# Patient Record
Sex: FEMALE | Race: WHITE | HISPANIC OR LATINO | Employment: UNEMPLOYED | URBAN - METROPOLITAN AREA
[De-identification: names, ages, dates, MRNs, and addresses within clinical notes are randomized per-mention and may not be internally consistent; named-entity substitution may affect disease eponyms.]

---

## 2016-08-18 LAB — PAP-ABSTRACT: NORMAL

## 2017-01-13 ENCOUNTER — TELEPHONE (OUTPATIENT)
Dept: NURSING | Facility: CLINIC | Age: 22
End: 2017-01-13

## 2017-01-13 ENCOUNTER — HOSPITAL ENCOUNTER (OUTPATIENT)
Facility: CLINIC | Age: 22
Discharge: HOME | End: 2017-01-13
Attending: OBSTETRICS & GYNECOLOGY | Admitting: OBSTETRICS & GYNECOLOGY
Payer: COMMERCIAL

## 2017-01-13 VITALS — SYSTOLIC BLOOD PRESSURE: 126 MMHG | TEMPERATURE: 97.7 F | HEIGHT: 62 IN | DIASTOLIC BLOOD PRESSURE: 58 MMHG

## 2017-01-13 LAB
ALBUMIN UR-MCNC: NEGATIVE MG/DL
APPEARANCE UR: CLEAR
BACTERIA #/AREA URNS HPF: ABNORMAL /HPF
BILIRUB UR QL STRIP: NEGATIVE
COLOR UR AUTO: ABNORMAL
FIBRONECTIN FETAL VAG QL: NORMAL
GLUCOSE UR STRIP-MCNC: NEGATIVE MG/DL
HGB UR QL STRIP: NEGATIVE
HYALINE CASTS #/AREA URNS LPF: 1 /LPF (ref 0–2)
KETONES UR STRIP-MCNC: NEGATIVE MG/DL
LEUKOCYTE ESTERASE UR QL STRIP: NEGATIVE
NITRATE UR QL: NEGATIVE
PH UR STRIP: 7.5 PH (ref 5–7)
RBC #/AREA URNS AUTO: <1 /HPF (ref 0–2)
SP GR UR STRIP: 1.01 (ref 1–1.03)
SQUAMOUS #/AREA URNS AUTO: 2 /HPF (ref 0–1)
URN SPEC COLLECT METH UR: ABNORMAL
UROBILINOGEN UR STRIP-MCNC: NORMAL MG/DL (ref 0–2)
WBC #/AREA URNS AUTO: <1 /HPF (ref 0–2)

## 2017-01-13 PROCEDURE — 99214 OFFICE O/P EST MOD 30 MIN: CPT | Mod: 25

## 2017-01-13 PROCEDURE — 99214 OFFICE O/P EST MOD 30 MIN: CPT

## 2017-01-13 PROCEDURE — 81001 URINALYSIS AUTO W/SCOPE: CPT | Performed by: OBSTETRICS & GYNECOLOGY

## 2017-01-13 PROCEDURE — 59025 FETAL NON-STRESS TEST: CPT | Mod: 26 | Performed by: OBSTETRICS & GYNECOLOGY

## 2017-01-13 PROCEDURE — 40000809 ZZH STATISTIC NO DOCUMENTATION TO SUPPORT CHARGE

## 2017-01-13 PROCEDURE — 59025 FETAL NON-STRESS TEST: CPT | Mod: 59

## 2017-01-13 PROCEDURE — 82731 ASSAY OF FETAL FIBRONECTIN: CPT | Performed by: OBSTETRICS & GYNECOLOGY

## 2017-01-13 RX ORDER — ONDANSETRON 2 MG/ML
4 INJECTION INTRAMUSCULAR; INTRAVENOUS EVERY 6 HOURS PRN
Status: DISCONTINUED | OUTPATIENT
Start: 2017-01-13 | End: 2017-01-13 | Stop reason: HOSPADM

## 2017-01-13 NOTE — PLAN OF CARE
Pt given discharge instructions, verbalized understanding.  All questions answered, ambulatory and able to leave floor by self.

## 2017-01-13 NOTE — PROVIDER NOTIFICATION
Called Dr Ochoa, updated that labs were WNL, only 2 ctx noted on monitor, pt feeling crampy still but not any worse.  SVE done per his request and cervix was closed.  Will send home,  No further follow up needed.

## 2017-01-13 NOTE — IP AVS SNAPSHOT
MRN:[id number]                      After Visit Summary   1/13/2017    Malgorzata Ramachandran    MRN: [id number]           Thank you!     Thank you for choosing [facility] for your care. Our goal is always to provide you with excellent care. Hearing back from our patients is one way we can continue to improve our services. Please take a few minutes to complete the written survey that you may receive in the mail after you visit. If you would like to speak to someone directly about your visit please contact Patient Relations at [phone]. Thank you!          Patient Information     Date Of Birth          1995        About your hospital stay     You were admitted on:  January 13, 2017 You last received care in the:  [facility] Labor and Delivery    You were discharged on:  January 13, 2017       Who to Call     For medical emergencies, please call 911.  For non-urgent questions about your medical care, please call your primary care provider or clinic, [phone]          Attending Provider     Provider    Ricardo Ochoa MD       Primary Care Provider Office Phone # Fax #    [facility] [phone] [address] [facility] [address]        Your next 10 appointments already scheduled     Jan 16, 2017  3:00 PM   New Prenatal with Parth [facility]ja MD   [facility]    [address]  [facility] [address] [phone]              Further instructions from your care team       Discharge Instruction for Undelivered Patients      You were seen for: Labor Assessment and Fetal Assessment  We Consulted: DR Ochoa  You had (Test or Medicine):Fetal monitoring, urine testing     Diet:   Drink 8 to 12 glasses of liquids (milk, juice, water) every day.     Activity:  Count fetal kicks everyday (see handout)  Call your doctor or nurse midwife if your baby is moving less than  "usual.     Call your provider if you notice:  Swelling in your face or increased swelling in your hands or legs.  Headaches that are not relieved by Tylenol (acetaminophen).  Changes in your vision (blurring: seeing spots or stars.)  Nausea (sick to your stomach) and vomiting (throwing up).   Weight gain of 5 pounds or more per week.  Heartburn that doesn't go away.  Signs of bladder infection: pain when you urinate (use the toilet), need to go more often and more urgently.  The bag of hollis (rupture of membranes) breaks, or you notice leaking in your underwear.  Bright red blood in your underwear.  Abdominal (lower belly) or stomach pain.  Second (plus) baby: Contractions (tightening) less than 10 minutes apart and getting stronger.  *If less than 34 weeks: Contractions (tightenings) more than 6 times in one hour.  Increase or change in vaginal discharge (note the color and amount)    Follow-up:  As scheduled in the clinic          Pending Results     No orders found from 2017 to 2017.            Admission Information        Provider Department Dept Phone    2017 Ricardo Ochoa MD, MD  Labor And Delivery [phone]      Your Vitals Were     Blood Pressure Temperature Height Last Period          126/58 mmHg 97.7  F (36.5  C) (Oral) 1.575 m (5' 2\") 2016 (Exact Date)        [employer] Information     [employer] lets you send messages to your doctor, view your test results, renew your prescriptions, schedule appointments and more. To sign up, go to [email or web address] . Click on \"Log in\" on the left side of the screen, which will take you to the Welcome page. Then click on \"Sign up Now\" on the right side of the page.     You will be asked to enter the access code listed below, as well as some personal information. Please follow the directions to create your username and password.     Your access code is: [email or web address]  Expires: 3/11/2017  3:54 AM     Your access code will  in 90 days. " If you need help or a new code, please call your [place] clinic or [phone].        Care EveryWhere ID     This is your Care EveryWhere ID. This could be used by other organizations to access your [place] medical records  [id number]           Review of your medicines      UNREVIEWED medicines. Ask your doctor about these medicines        Dose / Directions    alum & mag hydroxide-simethicone 200-200-20 MG/5ML Susp suspension   Commonly known as:  MYLANTA        Dose:  30 mL   Take 30 mLs by mouth every 4 hours as needed for indigestion   Quantity:  1 Bottle   Refills:  0       metoclopramide 10 MG tablet   Commonly known as:  REGLAN        Dose:  10 mg   Take 1 tablet (10 mg) by mouth every 8 hours as needed (Nausea or Vomiting)   Quantity:  20 tablet   Refills:  1       prenatal multivitamin  plus iron 27-0.8 MG Tabs per tablet        Dose:  1 tablet   Take 1 tablet by mouth daily   Quantity:  90 tablet   Refills:  3                Protect others around you: Learn how to safely use, store and throw away your medicines at www.disposemymeds.org.             Medication List: This is a list of all your medications and when to take them. Check marks below indicate your daily home schedule. Keep this list as a reference.      Medications           Morning Afternoon Evening Bedtime As Needed    alum & mag hydroxide-simethicone 200-200-20 MG/5ML Susp suspension   Commonly known as:  MYLANTA   Take 30 mLs by mouth every 4 hours as needed for indigestion                                metoclopramide 10 MG tablet   Commonly known as:  REGLAN   Take 1 tablet (10 mg) by mouth every 8 hours as needed (Nausea or Vomiting)                                prenatal multivitamin  plus iron 27-0.8 MG Tabs per tablet   Take 1 tablet by mouth daily

## 2017-01-13 NOTE — IP AVS SNAPSHOT
[facility] Labor and Delivery    [address]    [facility] [address]    Phone:  [phone]    Fax:  [phone]                                       After Visit Summary   1/13/2017    Malgorzata Ramachandran    MRN: [id number]           After Visit Summary Signature Page     I have received my discharge instructions, and my questions have been answered. I have discussed any challenges I see with this plan with the nurse or doctor.    ..........................................................................................................................................  Patient/Patient Representative Signature      ..........................................................................................................................................  Patient Representative Print Name and Relationship to Patient    ..................................................               ................................................  Date                                            Time    ..........................................................................................................................................  Reviewed by Signature/Title    ...................................................              ..............................................  Date                                                            Time

## 2017-01-13 NOTE — TELEPHONE ENCOUNTER
"Call Type: Triage Call    Presenting Problem: EED: 17. \"I have been cramping like bad.\"  Also, described clear/white mucous discharge. Dr. Nice paged to  [phone] @ 10:07 hrs.  Triage Note:  Guideline Title: Pregnancy:  Labor, 20 to 37 Weeks  Recommended Disposition: Call Provider Immediately  Original Inclination:  Override Disposition:  Intended Action:  Physician Contacted: No  Gestation 20 weeks or more AND decreased fetal movement compared to previous  activity ?  YES  New or worsening signs and symptoms that may indicate shock ? NO  Gestation more than 37 weeks AND signs of labor ? NO  Gestation less than 20 weeks AND signs of labor ? NO  Feeling of baby coming or wanting to push (urge to bear down) ? NO  Unbearable abdominal/pelvic pain ? NO  Umbilical cord or any part of the baby (head, bottom, arm or leg) at the opening  of the vagina ? NO  Gush or leakage of green or green-tinged or port-wine colored fluid (reddish and  watery) from the vagina ? NO  No relief between contractions ? NO  Continuous bright red vaginal bleeding for more than 15 minutes (more than  spotting) ? NO  Physician Instructions:  Care Advice: IMMEDIATE ACTION  Drink 2 or 3 glasses of water or juice.  Do not drink caffeinated drinks or  sodas.  See another provider immediately if unable to talk with your provider  within 1 hour. Follow the directions from your provider's on call resource  if you are unable to speak to your provider directly.  You may be directed  to go to the hospital's Labor & Delivery department for evaluation. Another  adult should drive.  Lie on left side to improve circulation to the fetus.  Call  if any of these occur: profuse bright red vaginal bleeding  continuous (without relaxation) abdominal pain  the umbilical cord or any fetal part in vagina  bag of hollis coming through vagina  feeling of wanting to push or have a bowel movement.  "

## 2017-01-13 NOTE — PROGRESS NOTES
here at 28.1 weeks with cramping and decreased movement since last evening.  She also noted a small amount of brown mucousy discharge last evening one time, but has had no further leaking or discharge since.  EFM placed and explained, urine and FFN collected and sent per Dr Ochoa's order.

## 2017-01-16 ENCOUNTER — PRENATAL OFFICE VISIT (OUTPATIENT)
Dept: OBGYN | Facility: CLINIC | Age: 22
End: 2017-01-16
Payer: COMMERCIAL

## 2017-01-16 VITALS
DIASTOLIC BLOOD PRESSURE: 58 MMHG | SYSTOLIC BLOOD PRESSURE: 100 MMHG | TEMPERATURE: 98.4 F | HEIGHT: 62 IN | WEIGHT: 197.5 LBS | BODY MASS INDEX: 36.34 KG/M2

## 2017-01-16 DIAGNOSIS — M54.42 BILATERAL LOW BACK PAIN WITH LEFT-SIDED SCIATICA, UNSPECIFIED CHRONICITY: ICD-10-CM

## 2017-01-16 DIAGNOSIS — O09.623 HIGH-RISK PREGNANCY, YOUNG MULTIGRAVIDA, THIRD TRIMESTER: Primary | ICD-10-CM

## 2017-01-16 DIAGNOSIS — O34.219 PREVIOUS CESAREAN DELIVERY, ANTEPARTUM CONDITION OR COMPLICATION: ICD-10-CM

## 2017-01-16 PROBLEM — O09.629 HIGH-RISK PREGNANCY, YOUNG MULTIGRAVIDA: Status: ACTIVE | Noted: 2017-01-16

## 2017-01-16 PROCEDURE — 90471 IMMUNIZATION ADMIN: CPT | Performed by: OBSTETRICS & GYNECOLOGY

## 2017-01-16 PROCEDURE — 90715 TDAP VACCINE 7 YRS/> IM: CPT | Performed by: OBSTETRICS & GYNECOLOGY

## 2017-01-16 PROCEDURE — 99201 ZZC OFFICE/OUTPT VISIT, NEW, LEVEL I: CPT | Mod: 25 | Performed by: OBSTETRICS & GYNECOLOGY

## 2017-01-16 RX ORDER — PRENATAL VIT/IRON FUM/FOLIC AC 27MG-0.8MG
1 TABLET ORAL DAILY
Qty: 90 TABLET | Refills: 3 | Status: SHIPPED | OUTPATIENT
Start: 2017-01-16 | End: 2017-04-12

## 2017-01-16 NOTE — PROGRESS NOTES
IUP at 28 4/7 weeks;      Transfer of prenatal care     Prenatal records reviewed; normal First Trimester Screen/fetal survey     Previous C/S; desires repeat  Will schedule at next visit  Will do glucose in 1-2 weeks

## 2017-01-16 NOTE — NURSING NOTE
"Chief Complaint   Patient presents with     Prenatal Care     Transferred OB Care   28w4d  Unable to stay for 1 hour GCT today   Needs rx for prenatal vit - pended in epic  Here today with her daughter    initial /58 mmHg  Temp(Src) 98.4  F (36.9  C) (Oral)  Ht 5' 2\" (1.575 m)  Wt 197 lb 8 oz (89.585 kg)  BMI 36.11 kg/m2  LMP 06/30/2016 (Exact Date) Estimated body mass index is 36.11 kg/(m^2) as calculated from the following:    Height as of this encounter: 5' 2\" (1.575 m).    Weight as of this encounter: 197 lb 8 oz (89.585 kg).  BP completed using cuff size large.  Lise Saucedo CMA    "

## 2017-01-19 ENCOUNTER — TELEPHONE (OUTPATIENT)
Dept: OBGYN | Facility: CLINIC | Age: 22
End: 2017-01-19

## 2017-01-19 NOTE — TELEPHONE ENCOUNTER
Patient called and requested to schedule her  section on 3/31/17.    [facility] is full on 3/31/17.  Called patient to offer to schedule on 4/3/17 with Dr. Loja.  Patient would prefer to schedule on 3/30/17.  Patient advised Dr. Loja is off on  and she stated that she would like to schedule with another provider on 3/30/17 if possible.  If that is not possible, she will schedule on 4/3/17 with Dr. Loja.  Please advise.  Thank you.

## 2017-01-24 ENCOUNTER — ALLIED HEALTH/NURSE VISIT (OUTPATIENT)
Dept: NURSING | Facility: CLINIC | Age: 22
End: 2017-01-24
Payer: COMMERCIAL

## 2017-01-24 DIAGNOSIS — O09.623 HIGH-RISK PREGNANCY, YOUNG MULTIGRAVIDA, THIRD TRIMESTER: ICD-10-CM

## 2017-01-24 DIAGNOSIS — Z23 NEED FOR PROPHYLACTIC VACCINATION AND INOCULATION AGAINST INFLUENZA: Primary | ICD-10-CM

## 2017-01-24 LAB
ERYTHROCYTE [DISTWIDTH] IN BLOOD BY AUTOMATED COUNT: 12.5 % (ref 10–15)
GLUCOSE 1H P 50 G GLC PO SERPL-MCNC: 105 MG/DL (ref 60–129)
HCT VFR BLD AUTO: 32.6 % (ref 35–47)
HGB BLD-MCNC: 10.8 G/DL (ref 11.7–15.7)
MCH RBC QN AUTO: 31.2 PG (ref 26.5–33)
MCHC RBC AUTO-ENTMCNC: 33.1 G/DL (ref 31.5–36.5)
MCV RBC AUTO: 94 FL (ref 78–100)
PLATELET # BLD AUTO: 225 10E9/L (ref 150–450)
RBC # BLD AUTO: 3.46 10E12/L (ref 3.8–5.2)
WBC # BLD AUTO: 10.4 10E9/L (ref 4–11)

## 2017-01-24 PROCEDURE — 85027 COMPLETE CBC AUTOMATED: CPT | Performed by: OBSTETRICS & GYNECOLOGY

## 2017-01-24 PROCEDURE — 90471 IMMUNIZATION ADMIN: CPT

## 2017-01-24 PROCEDURE — 90686 IIV4 VACC NO PRSV 0.5 ML IM: CPT

## 2017-01-24 PROCEDURE — 86780 TREPONEMA PALLIDUM: CPT | Performed by: OBSTETRICS & GYNECOLOGY

## 2017-01-24 PROCEDURE — 36415 COLL VENOUS BLD VENIPUNCTURE: CPT | Performed by: OBSTETRICS & GYNECOLOGY

## 2017-01-24 PROCEDURE — 82950 GLUCOSE TEST: CPT | Performed by: OBSTETRICS & GYNECOLOGY

## 2017-01-24 NOTE — PROGRESS NOTES
Injectable Influenza Immunization Documentation    1.  Is the person to be vaccinated sick today?  No    2. Does the person to be vaccinated have an allergy to eggs or to a component of the vaccine?  No    3. Has the person to be vaccinated today ever had a serious reaction to influenza vaccine in the past?  No    4. Has the person to be vaccinated ever had Guillain-[place] syndrome?  No     Form completed by Deya Vides MA

## 2017-01-24 NOTE — TELEPHONE ENCOUNTER
Surgery:  REPEAT  SECTION  Date:  3/30/17  Time:  11:30 AM  Hospital:  [facility]  Surgeon:  Dr. Dodson    Patient advised of the following:  The hospital will contact you 24-48 hours prior to surgery to discuss any pre op instructions.  Contact your insurance company to see if a pre authorization is needed.  A pre op physical will be done by your OB/GYN physician at one of your prenatal appointments.  Make arrangements to have someone drive you home from the hospital.    Surgery specific and hospital information mailed to patient.    Information was placed on the surgery calendar in [place].

## 2017-01-25 LAB — T PALLIDUM IGG+IGM SER QL: NEGATIVE

## 2017-02-20 ENCOUNTER — PRENATAL OFFICE VISIT (OUTPATIENT)
Dept: OBGYN | Facility: CLINIC | Age: 22
End: 2017-02-20
Payer: MEDICAID

## 2017-02-20 VITALS
DIASTOLIC BLOOD PRESSURE: 50 MMHG | SYSTOLIC BLOOD PRESSURE: 96 MMHG | WEIGHT: 202.6 LBS | TEMPERATURE: 98.5 F | BODY MASS INDEX: 37.06 KG/M2

## 2017-02-20 DIAGNOSIS — O09.623 HIGH-RISK PREGNANCY, YOUNG MULTIGRAVIDA, THIRD TRIMESTER: Primary | ICD-10-CM

## 2017-02-20 LAB
ALBUMIN UR-MCNC: NEGATIVE MG/DL
APPEARANCE UR: CLEAR
BILIRUB UR QL STRIP: NEGATIVE
COLOR UR AUTO: YELLOW
GLUCOSE UR STRIP-MCNC: NEGATIVE MG/DL
HGB UR QL STRIP: NEGATIVE
KETONES UR STRIP-MCNC: NEGATIVE MG/DL
LEUKOCYTE ESTERASE UR QL STRIP: NEGATIVE
NITRATE UR QL: NEGATIVE
PH UR STRIP: 6.5 PH (ref 5–7)
SP GR UR STRIP: 1.02 (ref 1–1.03)
URN SPEC COLLECT METH UR: NORMAL
UROBILINOGEN UR STRIP-ACNC: 1 EU/DL (ref 0.2–1)

## 2017-02-20 PROCEDURE — 99212 OFFICE O/P EST SF 10 MIN: CPT | Performed by: OBSTETRICS & GYNECOLOGY

## 2017-02-20 PROCEDURE — 87086 URINE CULTURE/COLONY COUNT: CPT | Performed by: OBSTETRICS & GYNECOLOGY

## 2017-02-20 PROCEDURE — 81003 URINALYSIS AUTO W/O SCOPE: CPT | Performed by: OBSTETRICS & GYNECOLOGY

## 2017-02-20 NOTE — MR AVS SNAPSHOT
After Visit Summary   2/20/2017    Malgorzata Ramachandran    MRN: [id number]           Patient Information     Date Of Birth          1995        Visit Information        Provider Department      2/20/2017 5:30 PM Parth Loja MD [facility]        Today's Diagnoses     High-risk pregnancy, young multigravida, third trimester    -  1       Follow-ups after your visit        Follow-up notes from your care team     Return in about 2 weeks (around 3/6/2017).      Your next 10 appointments already scheduled     Mar 30, 2017   Procedure with Teodora Dodson,    [facility] Labor and Delivery (--)    [address] [facility] [address] [phone]              Who to contact     If you have questions or need follow up information about today's clinic visit or your schedule please contact [facility] directly at [phone].  Normal or non-critical lab and imaging results will be communicated to you by MyChart, letter or phone within 4 business days after the clinic has received the results. If you do not hear from us within 7 days, please contact the clinic through [employer] or phone. If you have a critical or abnormal lab result, we will notify you by phone as soon as possible.  Submit refill requests through [employer] or call your pharmacy and they will forward the refill request to us. Please allow 3 business days for your refill to be completed.          Additional Information About Your Visit        MyChart Information     [employer] gives you secure access to your electronic health record. If you see a primary care provider, you can also send messages to your care team and make appointments. If you have questions, please call your primary care clinic.  If you do not have a primary care provider, please call [phone] and they will assist you.        Care EveryWhere ID     This is your Care EveryWhere ID. This could be used by other  organizations to access your [place] medical records  [id number]        Your Vitals Were     Temperature Last Period BMI (Body Mass Index)             98.5  F (36.9  C) (Oral) 06/30/2016 (Exact Date) 37.06 kg/m2          Blood Pressure from Last 3 Encounters:   02/20/17 96/50   01/16/17 100/58   01/13/17 126/58    Weight from Last 3 Encounters:   02/20/17 202 lb 9.6 oz (91.9 kg)   01/16/17 197 lb 8 oz (89.6 kg)   12/11/16 190 lb 14.7 oz (86.6 kg)              We Performed the Following     UA reflex to Microscopic     Urine Culture Aerobic Bacterial        Primary Care Provider Office Phone # Fax #    [facility] [phone] [address] [facility] [address]        Thank you!     Thank you for choosing [facility]  for your care. Our goal is always to provide you with excellent care. Hearing back from our patients is one way we can continue to improve our services. Please take a few minutes to complete the written survey that you may receive in the mail after your visit with us. Thank you!             Your Updated Medication List - Protect others around you: Learn how to safely use, store and throw away your medicines at www.disposemymeds.org.          This list is accurate as of: 2/20/17  6:03 PM.  Always use your most recent med list.                   Brand Name Dispense Instructions for use    order for DME     1 Device    Equipment being ordered: maternal support belt       prenatal multivitamin  plus iron 27-0.8 MG Tabs per tablet     90 tablet    Take 1 tablet by mouth daily

## 2017-02-20 NOTE — NURSING NOTE
"Chief Complaint   Patient presents with     Prenatal Care     c/o low pelvic cramping   33w4d      Initial BP 96/50 (BP Location: Right arm, Cuff Size: Adult Large)  Temp 98.5  F (36.9  C) (Oral)  Wt 202 lb 9.6 oz (91.9 kg)  LMP 06/30/2016 (Exact Date)  BMI 37.06 kg/m2 Estimated body mass index is 37.06 kg/(m^2) as calculated from the following:    Height as of 1/16/17: 5' 2\" (1.575 m).    Weight as of this encounter: 202 lb 9.6 oz (91.9 kg).  Medication Reconciliation: complete    "

## 2017-02-21 ENCOUNTER — HOSPITAL ENCOUNTER (OUTPATIENT)
Facility: CLINIC | Age: 22
LOS: 1 days | Discharge: HOME | End: 2017-02-21
Attending: OBSTETRICS & GYNECOLOGY | Admitting: OBSTETRICS & GYNECOLOGY
Payer: MEDICAID

## 2017-02-21 VITALS — TEMPERATURE: 98.7 F | SYSTOLIC BLOOD PRESSURE: 118 MMHG | DIASTOLIC BLOOD PRESSURE: 61 MMHG | RESPIRATION RATE: 16 BRPM

## 2017-02-21 DIAGNOSIS — Z36.89 ENCOUNTER FOR TRIAGE IN PREGNANT PATIENT: Primary | ICD-10-CM

## 2017-02-21 LAB
A1 MICROGLOB PLACENTAL VAG QL: NEGATIVE
ALBUMIN UR-MCNC: 10 MG/DL
APPEARANCE UR: ABNORMAL
BILIRUB UR QL STRIP: NEGATIVE
COLOR UR AUTO: YELLOW
GLUCOSE UR STRIP-MCNC: NEGATIVE MG/DL
HGB UR QL STRIP: NEGATIVE
KETONES UR STRIP-MCNC: 5 MG/DL
LEUKOCYTE ESTERASE UR QL STRIP: NEGATIVE
MUCOUS THREADS #/AREA URNS LPF: PRESENT /LPF
NITRATE UR QL: NEGATIVE
PH UR STRIP: 6 PH (ref 5–7)
RBC #/AREA URNS AUTO: 1 /HPF (ref 0–2)
SP GR UR STRIP: 1.02 (ref 1–1.03)
SQUAMOUS #/AREA URNS AUTO: 20 /HPF (ref 0–1)
URN SPEC COLLECT METH UR: ABNORMAL
UROBILINOGEN UR STRIP-MCNC: NORMAL MG/DL (ref 0–2)
WBC #/AREA URNS AUTO: 1 /HPF (ref 0–2)

## 2017-02-21 PROCEDURE — 84112 EVAL AMNIOTIC FLUID PROTEIN: CPT | Performed by: OBSTETRICS & GYNECOLOGY

## 2017-02-21 PROCEDURE — 25000132 ZZH RX MED GY IP 250 OP 250 PS 637: Performed by: OBSTETRICS & GYNECOLOGY

## 2017-02-21 PROCEDURE — 59025 FETAL NON-STRESS TEST: CPT | Mod: 26 | Performed by: OBSTETRICS & GYNECOLOGY

## 2017-02-21 PROCEDURE — 59025 FETAL NON-STRESS TEST: CPT

## 2017-02-21 PROCEDURE — 99213 OFFICE O/P EST LOW 20 MIN: CPT | Mod: 25 | Performed by: OBSTETRICS & GYNECOLOGY

## 2017-02-21 PROCEDURE — 25800025 ZZH RX 258: Performed by: OBSTETRICS & GYNECOLOGY

## 2017-02-21 PROCEDURE — 99214 OFFICE O/P EST MOD 30 MIN: CPT | Mod: 25

## 2017-02-21 PROCEDURE — 40000809 ZZH STATISTIC NO DOCUMENTATION TO SUPPORT CHARGE

## 2017-02-21 PROCEDURE — 81001 URINALYSIS AUTO W/SCOPE: CPT | Performed by: OBSTETRICS & GYNECOLOGY

## 2017-02-21 PROCEDURE — 99214 OFFICE O/P EST MOD 30 MIN: CPT

## 2017-02-21 PROCEDURE — 96360 HYDRATION IV INFUSION INIT: CPT

## 2017-02-21 RX ORDER — HYDROXYZINE PAMOATE 50 MG/1
50-100 CAPSULE ORAL EVERY 6 HOURS PRN
Qty: 10 CAPSULE | Refills: 0 | Status: SHIPPED | OUTPATIENT
Start: 2017-02-21 | End: 2017-04-12

## 2017-02-21 RX ORDER — ACETAMINOPHEN 325 MG/1
650 TABLET ORAL ONCE
Status: COMPLETED | OUTPATIENT
Start: 2017-02-21 | End: 2017-02-21

## 2017-02-21 RX ADMIN — ACETAMINOPHEN 650 MG: 325 TABLET, FILM COATED ORAL at 17:25

## 2017-02-21 RX ADMIN — SODIUM CHLORIDE, POTASSIUM CHLORIDE, SODIUM LACTATE AND CALCIUM CHLORIDE 500 ML: 600; 310; 30; 20 INJECTION, SOLUTION INTRAVENOUS at 17:36

## 2017-02-21 NOTE — PROGRESS NOTES
IUP at 33 4/7 weeks; no vaginal bleeding/ROM or regular contractions     -does note increasing pelvic pressure  PTL warnings reviewed  Planned repeat C/S

## 2017-02-21 NOTE — IP AVS SNAPSHOT
MRN:[id number]                      After Visit Summary   2017    Malgorzata Ramachandran    MRN: [id number]           Thank you!     Thank you for choosing [facility] for your care. Our goal is always to provide you with excellent care. Hearing back from our patients is one way we can continue to improve our services. Please take a few minutes to complete the written survey that you may receive in the mail after you visit. If you would like to speak to someone directly about your visit please contact Patient Relations at [phone]. Thank you!          Patient Information     Date Of Birth          1995        About your hospital stay     You were admitted on:  2017 You last received care in the:  [facility] Labor and Delivery    You were discharged on:  2017       Who to Call     For medical emergencies, please call 911.  For non-urgent questions about your medical care, please call your primary care provider or clinic, [phone]          Attending Provider     Provider Specialty    Suki Moody MD OB/Gyn       Primary Care Provider Office Phone # Fax #    [facility] [phone] [address] [facility] [address]        Your next 10 appointments already scheduled     Mar 30, 2017   Procedure with Teodora Dodson, DO   [facility] Labor and Delivery (--)    [address]  [facility] [id number] [phone]              Further instructions from your care team       Discharge Instruction for Undelivered Patients      You were seen for: abdominal pain/rule out  labor  We Consulted: Dr Suki Moody  You had (Test or Medicine): IV fluids, tylenol, SVE, UA     Diet:   Drink 8 to 12 glasses of liquids (milk, juice, water) every day.  You may eat meals and snacks.     Activity:  Count fetal kicks everyday (see handout)  Call your doctor or nurse midwife if your baby  is moving less than usual.     Call your provider if you notice:  Swelling in your face or increased swelling in your hands or legs.  Headaches that are not relieved by Tylenol (acetaminophen).  Changes in your vision (blurring: seeing spots or stars.)  Nausea (sick to your stomach) and vomiting (throwing up).   Weight gain of 5 pounds or more per week.  Heartburn that doesn't go away.  Signs of bladder infection: pain when you urinate (use the toilet), need to go more often and more urgently.  The bag of hollis (rupture of membranes) breaks, or you notice leaking in your underwear.  Bright red blood in your underwear.  Abdominal (lower belly) or stomach pain.  Second (plus) baby: Contractions (tightening) less than 10 minutes apart and getting stronger.  *If less than 34 weeks: Contractions (tightenings) more than 6 times in one hour.  Increase or change in vaginal discharge (note the color and amount)    Follow-up:  As scheduled in the clinic          Pending Results     Date and Time Order Name Status Description    2/20/2017 1751 URINE CULTURE AEROBIC BACTERIAL In process             Admission Information     Date & Time Provider Department Dept. Phone    2/21/2017 Suki Moody MD [facility] Labor and Delivery [phone]      Your Vitals Were     Blood Pressure Temperature Respirations Last Period          118/61 98.7  F (37.1  C) (Oral) 16 06/30/2016 (Exact Date)        MyChart Information     [employer] gives you secure access to your electronic health record. If you see a primary care provider, you can also send messages to your care team and make appointments. If you have questions, please call your primary care clinic.  If you do not have a primary care provider, please call [phone] and they will assist you.        Care EveryWhere ID     This is your Care EveryWhere ID. This could be used by other organizations to access your [place] medical records  [id number]           Review of your  medicines      UNREVIEWED medicines. Ask your doctor about these medicines        Dose / Directions    prenatal multivitamin  plus iron 27-0.8 MG Tabs per tablet   Used for:  Previous  delivery, antepartum condition or complication, High-risk pregnancy, young multigravida, third trimester        Dose:  1 tablet   Take 1 tablet by mouth daily   Quantity:  90 tablet   Refills:  3         START taking        Dose / Directions    hydrOXYzine 50 MG capsule   Commonly known as:  VISTARIL        Dose:   mg   Take 1-2 capsules ( mg) by mouth every 6 hours as needed for other (rest)   Quantity:  10 capsule   Refills:  0         CONTINUE these medicines which have NOT CHANGED        Dose / Directions    order for DME   Used for:  Bilateral low back pain with left-sided sciatica, unspecified chronicity, High-risk pregnancy, young multigravida, third trimester        Equipment being ordered: maternal support belt   Quantity:  1 Device   Refills:  1            Where to get your medicines      These medications were sent to [facility] [address] [place] [address]    Hours:  24-hours Phone:  [phone]     hydrOXYzine 50 MG capsule                Protect others around you: Learn how to safely use, store and throw away your medicines at www.disposemymeds.org.             Medication List: This is a list of all your medications and when to take them. Check marks below indicate your daily home schedule. Keep this list as a reference.      Medications           Morning Afternoon Evening Bedtime As Needed    hydrOXYzine 50 MG capsule   Commonly known as:  VISTARIL   Take 1-2 capsules ( mg) by mouth every 6 hours as needed for other (rest)                                order for DME   Equipment being ordered: maternal support belt                                prenatal multivitamin  plus iron 27-0.8 MG Tabs  per tablet   Take 1 tablet by mouth daily

## 2017-02-21 NOTE — PROVIDER NOTIFICATION
02/21/17 3499   Provider Notification   Provider Name/Title Dr MARIANA Moody   Method of Notification Phone   Notification Reason Patient Arrived;Status Update;Uterine Activity;Lab/Diagnostic Study;Pain     MD notified of patient arrival via ED, per MD, give 500mL LR bolus, obtain amnisure, tylenol 650mg and warm pack and see if discomfort improves, will then update MD.

## 2017-02-21 NOTE — PLAN OF CARE
@ 33 5/7wks who presents via ER for lower abdominal pain/cramping that started yesterday and has worsened into today, patient does not feel they are contractions, denies bleeding, does report some leaking of fluid that occurs after she stands up from getting done urinating, denies any discomfort/freqency/bleeding with urination, VSS, health history obtained, monitors applied and explained, will monitor and send urine for analysis and update MD at this time.

## 2017-02-21 NOTE — IP AVS SNAPSHOT
[facility]    [address]    [facility] [address]    Phone:  [phone]    Fax:  [phone]                                       After Visit Summary   2/21/2017    Malgorzata Ramachandran    MRN: [id number]           After Visit Summary Signature Page     I have received my discharge instructions, and my questions have been answered. I have discussed any challenges I see with this plan with the nurse or doctor.    ..........................................................................................................................................  Patient/Patient Representative Signature      ..........................................................................................................................................  Patient Representative Print Name and Relationship to Patient    ..................................................               ................................................  Date                                            Time    ..........................................................................................................................................  Reviewed by Signature/Title    ...................................................              ..............................................  Date                                                            Time

## 2017-02-22 LAB
BACTERIA SPEC CULT: NORMAL
MICRO REPORT STATUS: NORMAL
SPECIMEN SOURCE: NORMAL

## 2017-02-22 NOTE — PROGRESS NOTES
Data: [facility] at 1615.   Reason for maternal/fetal assessment per patient is Abdominal Pain and Abdominal Cramping  . Patient is a . Prenatal record reviewed.      Obstetric History       T1      TAB0   SAB0   E0   M0   L1       # Outcome Date GA Lbr Farooq/2nd Weight Sex Delivery Anes PTL Lv   2 Current            1 Term 14 41w1d  3.515 kg (7 lb 12 oz) F CS-Unspec Spinal  Y      Name: Miah         Medical History:   Past Medical History   Diagnosis Date     Abnormal Pap smear    . Gestational Age 33w5d. VSS. Cervix: posterior and closed.  Fetal movement present. Patient denies backache, UTI symptoms, GI problems, bloody show, vaginal bleeding, edema, headache, visual disturbances, epigastric or URQ pain.. Support persons FOB present.  Action: Verbal consent for EFM. Triage assessment completed. EFM applied for fetal well-being. Uterine assessment per toco and palpation. Fetal assessment: Presumed adequate fetal oxygenation documented (see flow record). Patient instructed to report change in fetal movement, vaginal leaking of fluid or bleeding, abdominal pain, or any concerns related to the pregnancy to her nurse/physician.   Response: Dr. Suki Moody to triage to assess patient. Plan per provider is discharge home with vistaril prescription. Patient verbalized understanding of education and verbalized agreement with plan. Discharged ambulatory at 1940.

## 2017-02-22 NOTE — DISCHARGE INSTRUCTIONS
Discharge Instruction for Undelivered Patients      You were seen for: abdominal pain/rule out  labor  We Consulted: Dr Suki Moody  You had (Test or Medicine): IV fluids, tylenol, SVE, UA     Diet:   Drink 8 to 12 glasses of liquids (milk, juice, water) every day.  You may eat meals and snacks.     Activity:  Count fetal kicks everyday (see handout)  Call your doctor or nurse midwife if your baby is moving less than usual.     Call your provider if you notice:  Swelling in your face or increased swelling in your hands or legs.  Headaches that are not relieved by Tylenol (acetaminophen).  Changes in your vision (blurring: seeing spots or stars.)  Nausea (sick to your stomach) and vomiting (throwing up).   Weight gain of 5 pounds or more per week.  Heartburn that doesn't go away.  Signs of bladder infection: pain when you urinate (use the toilet), need to go more often and more urgently.  The bag of hollis (rupture of membranes) breaks, or you notice leaking in your underwear.  Bright red blood in your underwear.  Abdominal (lower belly) or stomach pain.  Second (plus) baby: Contractions (tightening) less than 10 minutes apart and getting stronger.  *If less than 34 weeks: Contractions (tightenings) more than 6 times in one hour.  Increase or change in vaginal discharge (note the color and amount)    Follow-up:  As scheduled in the clinic

## 2017-02-22 NOTE — PROVIDER NOTIFICATION
02/21/17 1834   Provider Notification   Provider Name/Title Dr MARIANA Moody   Method of Notification Phone   Notification Reason Status Update   MD notified of completed interventions and patient reporting no improvement in symptoms, per MD Hemphill, and MD will come over from office after a bit to see patient.

## 2017-02-22 NOTE — PROGRESS NOTES
Obstetrics Triage Note    HPI:  Malgorzata Ramachandran is a 22 year old  female at 33w5d, here with complaints of lower abdominal cramping and pressure since this morning. No regular contractions.      + FM.  She states that she has otherwise been feeling well. She denies fever, N/V, chest pain, SOB, vaginal bleeding, vaginal discharge, dysuria, constipation. Last BM today, regular.     ROS:  Negative except as mentioned in HPI.    PMH:  Past Medical History   Diagnosis Date     Abnormal Pap smear        PSHx:  Past Surgical History   Procedure Laterality Date     Anesth,lower arm skin surg       Biopsy of skin lesion        section         Medications:    No current facility-administered medications on file prior to encounter.   Current Outpatient Prescriptions on File Prior to Encounter:  Prenatal Vit-Fe Fumarate-FA (PRENATAL MULTIVITAMIN  PLUS IRON) 27-0.8 MG TABS per tablet Take 1 tablet by mouth daily   order for DME Equipment being ordered: maternal support belt (Patient not taking: Reported on 2017)        Allergies:   No Known Allergies    Physical Exam:   Vitals:    17 1633 17 1639   BP: 136/61 118/61   Resp:  16   Temp:  98.7  F (37.1  C)   TempSrc:  Oral      Gen: resting comfortably, in NAD  CV: RRR, no m/r/g  Pulm: CTAB, no increased work of breathing  Abd: soft, gravid, non-tender to upper abdomen, non-distended, tender to palpation over bladder, no tenderness to bony pelvis  Cx: closed per RN    NST:  FHT: BL 140s, mod naz, + accels, no decels  Murphys: irritability, rare contractions    Labs/Imaging:  Results for orders placed or performed during the hospital encounter of 17 (from the past 24 hour(s))   UA with Microscopic reflex to Culture   Result Value Ref Range    Color Urine Yellow     Appearance Urine Slightly Cloudy     Glucose Urine Negative NEG mg/dL    Bilirubin Urine Negative NEG    Ketones Urine 5 (A) NEG mg/dL    Specific Gravity Urine 1.022 1.003 -  1.035    Blood Urine Negative NEG    pH Urine 6.0 5.0 - 7.0 pH    Protein Albumin Urine 10 (A) NEG mg/dL    Urobilinogen mg/dL Normal 0.0 - 2.0 mg/dL    Nitrite Urine Negative NEG    Leukocyte Esterase Urine Negative NEG    Source Midstream Urine     WBC Urine 1 0 - 2 /HPF    RBC Urine 1 0 - 2 /HPF    Squamous Epithelial /HPF Urine 20 (H) 0 - 1 /HPF    Mucous Urine Present (A) NEG /LPF   Rupture of membranes by Amnisure   Result Value Ref Range    Amnisure Negative NEG       Assessment/Plan: Malgorzata Ramachandran is a 22 year old female  at 33w5d, here for lower pelvic pressure and pain.  - No evidence of PTL. Likely increased pressure from descending fetal head.   - Dispo: to home with follow up in the next week. Discussed tylenol for pain as needed and vistaril for sleep. Simethicone for gas pain. Discussed labor warning signs and indication to return to care.    Suki Moody MD  OB/GYN     2017 7:16 PM

## 2017-03-13 ENCOUNTER — PRENATAL OFFICE VISIT (OUTPATIENT)
Dept: OBGYN | Facility: CLINIC | Age: 22
End: 2017-03-13
Payer: MEDICAID

## 2017-03-13 VITALS
HEIGHT: 62 IN | BODY MASS INDEX: 37.73 KG/M2 | WEIGHT: 205 LBS | DIASTOLIC BLOOD PRESSURE: 70 MMHG | SYSTOLIC BLOOD PRESSURE: 124 MMHG

## 2017-03-13 DIAGNOSIS — O09.623 HIGH-RISK PREGNANCY, YOUNG MULTIGRAVIDA, THIRD TRIMESTER: Primary | ICD-10-CM

## 2017-03-13 PROCEDURE — 99212 OFFICE O/P EST SF 10 MIN: CPT | Performed by: OBSTETRICS & GYNECOLOGY

## 2017-03-13 PROCEDURE — 87653 STREP B DNA AMP PROBE: CPT | Performed by: OBSTETRICS & GYNECOLOGY

## 2017-03-13 NOTE — PATIENT INSTRUCTIONS
You can reach your [facility] Care Team any time of the day by calling [phone]. This number will put you in touch with the 24 hour nurse line if the clinic is closed.    To contact your OB/GYN Station Coordinator/Surgery Scheduler please call [phone]. This is a direct number for your care team between 8 a.m. and 4 p.m. Monday through Friday.    [facility] is open for your convenience:  Monday through Friday 8 a.m. to 6 p.m.  Closed weekends and all major holidays.

## 2017-03-13 NOTE — NURSING NOTE
"36w4d    Chief Complaint   Patient presents with     Prenatal Care     GBS due       Initial /70  Ht 5' 2\" (1.575 m)  Wt 205 lb (93 kg)  LMP 06/30/2016 (Exact Date)  BMI 37.49 kg/m2 Estimated body mass index is 37.49 kg/(m^2) as calculated from the following:    Height as of this encounter: 5' 2\" (1.575 m).    Weight as of this encounter: 205 lb (93 kg).  Medication Reconciliation: fritz Seymour CMA      "

## 2017-03-13 NOTE — PROGRESS NOTES
Malgorzata Ramachandran is a 22 year old female  Estimated Date of Delivery: 2017 at 36w4d who presents for a prenatal visit. She does note that she has been leaking some fluid like she is going to the bathroom but not going to the bathroom. Additionally she notes that she has been having some dania maria type contractions that are about 20-30 minutes apart.   AGA doing well, good FM no concerns     This document serves as a record of the services and decisions personally performed and made by Rafal Vences M.D. It was created on his behalf by Earnestine Celis, a trained medical scribe. The creation of this document is based the provider's statements to the medical scribe.  Earnestine Celis 2017 4:22 PM     See OB Flowsheet  : no signs of ruptured membranes, bag of water is intact neg nitrazine    A: GBS done today, no signs of ruptured membranes on exam.   P: Follow up in 1 week. labor symptoms and symptoms of concern discussed, patient to call     The information in this document, created by the medical scribe for me, accurately reflects the services I personally performed and the decisions made by me. I have reviewed and approved this document for accuracy prior to leaving the patient care area.  3/13/2017 4:22 PM        Rafal Vences M.D.

## 2017-03-13 NOTE — MR AVS SNAPSHOT
After Visit Summary   3/13/2017    Malgorzata Ramachandran    MRN: [id number]           Patient Information     Date Of Birth          1995        Visit Information        Provider Department      3/13/2017 4:15 PM Rafal Vences MD [facility]        Today's Diagnoses     High-risk pregnancy, young multigravida, third trimester    -  1      Care Instructions    You can reach your [facility] Care Team any time of the day by calling [phone]. This number will put you in touch with the 24 hour nurse line if the clinic is closed.    To contact your OB/GYN Station Coordinator/Surgery Scheduler please call [phone]. This is a direct number for your care team between 8 a.m. and 4 p.m. Monday through Friday.    [facility] is open for your convenience:  Monday through Friday 8 a.m. to 6 p.m.  Closed weekends and all major holidays.         Follow-ups after your visit        Follow-up notes from your care team     Return in about 1 week (around 3/20/2017) for prenatal Visit.      Your next 10 appointments already scheduled     Mar 22, 2017 10:00 AM CDT   ESTABLISHED PRENATAL with Ricardo Ochoa MD   [facility]    [address]  [facility] [address] [phone]            Mar 30, 2017   Procedure with Teodora Dodson DO   [facility] Labor and Delivery (--)    [address]  [facility] [id number] [phone]              Who to contact     If you have questions or need follow up information about today's clinic visit or your schedule please contact [facility] directly at [phone].  Normal or non-critical lab and imaging results will be communicated to you by MyChart, letter or phone within 4 business days after the clinic has received the results. If you do not hear from us within 7 days, please contact the clinic through MyChart or phone. If you have a critical or  "abnormal lab result, we will notify you by phone as soon as possible.  Submit refill requests through s0cket or call your pharmacy and they will forward the refill request to us. Please allow 3 business days for your refill to be completed.          Additional Information About Your Visit        Mobile [facility] Information     s0cket gives you secure access to your electronic health record. If you see a primary care provider, you can also send messages to your care team and make appointments. If you have questions, please call your primary care clinic.  If you do not have a primary care provider, please call [phone] and they will assist you.        Care EveryWhere ID     This is your Care EveryWhere ID. This could be used by other organizations to access your [facility] medical records  [id number]        Your Vitals Were     Height Last Period BMI (Body Mass Index)             5' 2\" (1.575 m) 06/30/2016 (Exact Date) 37.49 kg/m2          Blood Pressure from Last 3 Encounters:   03/13/17 124/70   02/21/17 118/61   02/20/17 96/50    Weight from Last 3 Encounters:   03/13/17 205 lb (93 kg)   02/20/17 202 lb 9.6 oz (91.9 kg)   01/16/17 197 lb 8 oz (89.6 kg)              We Performed the Following     Strep, Group B by PCR        Primary Care Provider    No Pcp Confirmed       No address on file        Thank you!     Thank you for choosing [facility]  for your care. Our goal is always to provide you with excellent care. Hearing back from our patients is one way we can continue to improve our services. Please take a few minutes to complete the written survey that you may receive in the mail after your visit with us. Thank you!             Your Updated Medication List - Protect others around you: Learn how to safely use, store and throw away your medicines at www.disposemymeds.org.          This list is accurate as of: 3/13/17 11:59 PM.  Always use your most recent med list.                   Brand Name " Dispense Instructions for use    hydrOXYzine 50 MG capsule    VISTARIL    10 capsule    Take 1-2 capsules ( mg) by mouth every 6 hours as needed for other (rest)       order for DME     1 Device    Equipment being ordered: maternal support belt       prenatal multivitamin  plus iron 27-0.8 MG Tabs per tablet     90 tablet    Take 1 tablet by mouth daily

## 2017-03-15 LAB
GP B STREP DNA SPEC QL NAA+PROBE: NORMAL
SPECIMEN SOURCE: NORMAL

## 2017-03-19 ENCOUNTER — HOSPITAL ENCOUNTER (OUTPATIENT)
Facility: CLINIC | Age: 22
Discharge: HOME | End: 2017-03-19
Attending: FAMILY MEDICINE | Admitting: FAMILY MEDICINE
Payer: MEDICAID

## 2017-03-19 VITALS
BODY MASS INDEX: 37.73 KG/M2 | RESPIRATION RATE: 18 BRPM | TEMPERATURE: 98.5 F | WEIGHT: 205 LBS | DIASTOLIC BLOOD PRESSURE: 61 MMHG | HEIGHT: 62 IN | SYSTOLIC BLOOD PRESSURE: 115 MMHG

## 2017-03-19 DIAGNOSIS — Z36.89 ENCOUNTER FOR TRIAGE IN PREGNANT PATIENT: Primary | ICD-10-CM

## 2017-03-19 LAB
ALBUMIN UR-MCNC: 30 MG/DL
APPEARANCE UR: ABNORMAL
BACTERIA #/AREA URNS HPF: ABNORMAL /HPF
BILIRUB UR QL STRIP: NEGATIVE
COLOR UR AUTO: YELLOW
GLUCOSE UR STRIP-MCNC: NEGATIVE MG/DL
HGB UR QL STRIP: NEGATIVE
KETONES UR STRIP-MCNC: NEGATIVE MG/DL
LEUKOCYTE ESTERASE UR QL STRIP: NEGATIVE
MUCOUS THREADS #/AREA URNS LPF: PRESENT /LPF
NITRATE UR QL: NEGATIVE
PH UR STRIP: 6 PH (ref 5–7)
RBC #/AREA URNS AUTO: 5 /HPF (ref 0–2)
SP GR UR STRIP: 1.03 (ref 1–1.03)
SQUAMOUS #/AREA URNS AUTO: 5 /HPF (ref 0–1)
URN SPEC COLLECT METH UR: ABNORMAL
UROBILINOGEN UR STRIP-MCNC: 2 MG/DL (ref 0–2)
WBC #/AREA URNS AUTO: 1 /HPF (ref 0–2)

## 2017-03-19 PROCEDURE — 81001 URINALYSIS AUTO W/SCOPE: CPT | Performed by: FAMILY MEDICINE

## 2017-03-19 PROCEDURE — 99213 OFFICE O/P EST LOW 20 MIN: CPT

## 2017-03-19 PROCEDURE — 99214 OFFICE O/P EST MOD 30 MIN: CPT

## 2017-03-19 RX ORDER — AMOXICILLIN 500 MG/1
500 CAPSULE ORAL 3 TIMES DAILY
Qty: 21 CAPSULE | Refills: 0 | Status: SHIPPED | OUTPATIENT
Start: 2017-03-19 | End: 2017-03-26

## 2017-03-19 NOTE — PLAN OF CARE
Patient arrived to L&D c/o pelvic pressure. History and physical obtained.  37+3W gestation. Plan for repeat c/s on 3/30/17 Patient denies LOF, vaginal bleeding, + fetal movement. Has been feeling increasing pelvic pressure the past couple days, making it painful to walk. SVE closed. X1 ctx since admission. Reactive fetal tracing. UA sent, pending.

## 2017-03-19 NOTE — IP AVS SNAPSHOT
[facility] Labor and Delivery    [address]    [facility] [address]    Phone:  [phone]    Fax:  [phone]                                       After Visit Summary   3/19/2017    Malgorzata Ramachandran    MRN: [id number]           After Visit Summary Signature Page     I have received my discharge instructions, and my questions have been answered. I have discussed any challenges I see with this plan with the nurse or doctor.    ..........................................................................................................................................  Patient/Patient Representative Signature      ..........................................................................................................................................  Patient Representative Print Name and Relationship to Patient    ..................................................               ................................................  Date                                            Time    ..........................................................................................................................................  Reviewed by Signature/Title    ...................................................              ..............................................  Date                                                            Time

## 2017-03-19 NOTE — IP AVS SNAPSHOT
MRN:[id number]                      After Visit Summary   3/19/2017    Malgorzata Ramachandran    MRN: [id number]           Thank you!     Thank you for choosing [facility] for your care. Our goal is always to provide you with excellent care. Hearing back from our patients is one way we can continue to improve our services. Please take a few minutes to complete the written survey that you may receive in the mail after you visit. If you would like to speak to someone directly about your visit please contact Patient Relations at [phone]. Thank you!          Patient Information     Date Of Birth          1995        About your hospital stay     You were admitted on:  March 19, 2017 You last received care in the:  [facility] Labor [facility]    You were discharged on:  March 19, 2017       Who to Call     For medical emergencies, please call 911.  For non-urgent questions about your medical care, please call your primary care provider or clinic, None          Attending Provider     Provider Specialty    Teodora Dodson DO OB/Gyn       Primary Care Provider    No Pcp Confirmed       No address on file        Your next 10 appointments already scheduled     Mar 22, 2017 10:00 AM CDT   ESTABLISHED PRENATAL with Ricardo Ochoa MD   [facility]    [address]  [facility] [address]   [phone]            Mar 30, 2017   Procedure with Teodora Dodson DO   [facility] Labor [facility] Delivery (--)    [address]  [facility] [address]   [phone]              Further instructions from your care team       Discharge Instruction for Undelivered Patients      You were seen for: Labor Assessment  We Consulted: Dr. Dodson  You had (Test or Medicine):Urinanalysis, Sterile Vaginal Exam, External fetal and uterine monitoring     Diet:   Drink 8 to 12 glasses of liquids (milk, juice, water) every  "day.  You may eat meals and snacks.     Activity:  Call your doctor or nurse midwife if your baby is moving less than usual.     Call your provider if you notice:  Swelling in your face or increased swelling in your hands or legs.  Headaches that are not relieved by Tylenol (acetaminophen).  Changes in your vision (blurring: seeing spots or stars.)  Nausea (sick to your stomach) and vomiting (throwing up).   Weight gain of 5 pounds or more per week.  Heartburn that doesn't go away.  Signs of bladder infection: pain when you urinate (use the toilet), need to go more often and more urgently.  The bag of hollis (rupture of membranes) breaks, or you notice leaking in your underwear.  Bright red blood in your underwear.  Abdominal (lower belly) or stomach pain.  Second (plus) baby: Contractions (tightening) less than 10 minutes apart and getting stronger.  Increase or change in vaginal discharge (note the color and amount)      Follow-up:  As scheduled in the clinic          Pending Results     No orders found from 3/17/2017 to 3/20/2017.            Admission Information     Date & Time Provider Department Dept. Phone    3/19/2017 Teodora Dodson,  [facility] Labor and Delivery [phone]      Your Vitals Were     Blood Pressure Temperature Respirations Height Weight Last Period    115/61 98.5  F (36.9  C) (Oral) 18 1.575 m (5' 2\") 93 kg (205 lb) 06/30/2016 (Exact Date)    BMI (Body Mass Index)                   37.49 kg/m2           [employer] Information     [employer] gives you secure access to your electronic health record. If you see a primary care provider, you can also send messages to your care team and make appointments. If you have questions, please call your primary care clinic.  If you do not have a primary care provider, please call [phone] and they will assist you.        Care EveryWhere ID     This is your Care EveryWhere ID. This could be used by other organizations to access your [place] [facility] " medical records  [id number]           Review of your medicines      UNREVIEWED medicines. Ask your doctor about these medicines        Dose / Directions    hydrOXYzine 50 MG capsule   Commonly known as:  VISTARIL   Used for:  Encounter for triage in pregnant patient        Dose:   mg   Take 1-2 capsules ( mg) by mouth every 6 hours as needed for other (rest)   Quantity:  10 capsule   Refills:  0       prenatal multivitamin  plus iron 27-0.8 MG Tabs per tablet   Used for:  Previous  delivery, antepartum condition or complication, High-risk pregnancy, young multigravida, third trimester        Dose:  1 tablet   Take 1 tablet by mouth daily   Quantity:  90 tablet   Refills:  3         START taking        Dose / Directions    amoxicillin 500 MG capsule   Commonly known as:  AMOXIL   Used for:  Encounter for triage in pregnant patient        Dose:  500 mg   Take 1 capsule (500 mg) by mouth 3 times daily for 7 days   Quantity:  21 capsule   Refills:  0         CONTINUE these medicines which have NOT CHANGED        Dose / Directions    order for DME   Used for:  Bilateral low back pain with left-sided sciatica, unspecified chronicity, High-risk pregnancy, young multigravida, third trimester        Equipment being ordered: maternal support belt   Quantity:  1 Device   Refills:  1            Where to get your medicines      These medications were sent to [facility] [address] [place] [address]    Hours:  24-hours Phone:  [phone]     amoxicillin 500 MG capsule                Protect others around you: Learn how to safely use, store and throw away your medicines at www.disposemymeds.org.             Medication List: This is a list of all your medications and when to take them. Check marks below indicate your daily home schedule. Keep this list as a reference.      Medications           Morning  Afternoon Evening Bedtime As Needed    amoxicillin 500 MG capsule   Commonly known as:  AMOXIL   Take 1 capsule (500 mg) by mouth 3 times daily for 7 days                                hydrOXYzine 50 MG capsule   Commonly known as:  VISTARIL   Take 1-2 capsules ( mg) by mouth every 6 hours as needed for other (rest)                                order for DME   Equipment being ordered: maternal support belt                                prenatal multivitamin  plus iron 27-0.8 MG Tabs per tablet   Take 1 tablet by mouth daily

## 2017-03-19 NOTE — DISCHARGE INSTRUCTIONS
Discharge Instruction for Undelivered Patients      You were seen for: Labor Assessment  We Consulted: Dr. Dodson  You had (Test or Medicine):Urinanalysis, Sterile Vaginal Exam, External fetal and uterine monitoring     Diet:   Drink 8 to 12 glasses of liquids (milk, juice, water) every day.  You may eat meals and snacks.     Activity:  Call your doctor or nurse midwife if your baby is moving less than usual.     Call your provider if you notice:  Swelling in your face or increased swelling in your hands or legs.  Headaches that are not relieved by Tylenol (acetaminophen).  Changes in your vision (blurring: seeing spots or stars.)  Nausea (sick to your stomach) and vomiting (throwing up).   Weight gain of 5 pounds or more per week.  Heartburn that doesn't go away.  Signs of bladder infection: pain when you urinate (use the toilet), need to go more often and more urgently.  The bag of hollis (rupture of membranes) breaks, or you notice leaking in your underwear.  Bright red blood in your underwear.  Abdominal (lower belly) or stomach pain.  Second (plus) baby: Contractions (tightening) less than 10 minutes apart and getting stronger.  Increase or change in vaginal discharge (note the color and amount)      Follow-up:  As scheduled in the clinic

## 2017-03-19 NOTE — PROVIDER NOTIFICATION
03/19/17 0515   Provider Notification   Provider Name/Title Dr. Dodson   Method of Notification Electronic Page   Request Evaluate - Remote   Notification Reason Patient Arrived;SVE;Uterine Activity;Pain   Dr. Dodson updated on patient here c/o pelvic pressure. UA results reviewed. 4ctx since admission. Reactive tracing. Plan to treat for UTI. Continue to monitor for another hour, if patient not laboring, may discharge.

## 2017-03-19 NOTE — PROGRESS NOTES
Data: [facility].   Reason for maternal/fetal assessment per patient is Rule Out Labor (pelvic pressure)  . Patient is a . Prenatal record reviewed.      Obstetric History       T1      TAB0   SAB0   E0   M0   L1       # Outcome Date GA Lbr Farooq/2nd Weight Sex Delivery Anes PTL Lv   2 Current            1 Term 14 41w1d  3.515 kg (7 lb 12 oz) F CS-Unspec Spinal  Y      Name: Miah         Medical History:   Past Medical History   Diagnosis Date     Abnormal Pap smear    . Gestational Age 37w3d. VSS. Cervix: closed.  Fetal movement present. Patient denies cramping, backache, vaginal discharge, UTI symptoms, GI problems, bloody show, vaginal bleeding, edema, headache, visual disturbances, epigastric or URQ pain, abdominal pain, rupture of membranes. Support persons Darian is present.  Action: Verbal consent for EFM. Triage assessment completed. EFM applied for fetal well being. Uterine assessment completed. Fetal assessment: Presumed adequate fetal oxygenation documented (see flow record). Patient education pamphlets given on fetal movement counts and labor precautions. Patient instructed to report change in fetal movement, vaginal leaking of fluid or bleeding, abdominal pain, or any concerns related to the pregnancy to her nurse/physician.   Response: Dr. Dodson informed of UA, fetal strip, pelvic pressure, SVE. Plan per provider is treat for UTI, monitor patient for another hour. Okay to discharge if not laboring. Patient verbalized understanding of education and verbalized agreement with plan. Discharged ambulatory at 0630.

## 2017-03-22 ENCOUNTER — PRENATAL OFFICE VISIT (OUTPATIENT)
Dept: OBGYN | Facility: CLINIC | Age: 22
End: 2017-03-22
Payer: MEDICAID

## 2017-03-22 VITALS
BODY MASS INDEX: 38.44 KG/M2 | WEIGHT: 208.9 LBS | TEMPERATURE: 98.5 F | SYSTOLIC BLOOD PRESSURE: 124 MMHG | HEIGHT: 62 IN | DIASTOLIC BLOOD PRESSURE: 70 MMHG

## 2017-03-22 DIAGNOSIS — Z34.92 PRENATAL CARE IN SECOND TRIMESTER: Primary | ICD-10-CM

## 2017-03-22 PROCEDURE — 99212 OFFICE O/P EST SF 10 MIN: CPT | Performed by: FAMILY MEDICINE

## 2017-03-22 NOTE — PATIENT INSTRUCTIONS
Come to hospital at 930 am   Do not eat after 2 am       Dr. Teodora Dodson, DO    Obstetrics and Gynecology  [facility]

## 2017-03-22 NOTE — NURSING NOTE
"37w6d    Chief Complaint   Patient presents with     Prenatal Care      scheduled 3/30/17--contractions       Initial /70  Temp 98.5  F (36.9  C) (Oral)  Ht 5' 2\" (1.575 m)  Wt 208 lb 14.4 oz (94.8 kg)  LMP 2016 (Exact Date)  BMI 38.21 kg/m2 Estimated body mass index is 38.21 kg/(m^2) as calculated from the following:    Height as of this encounter: 5' 2\" (1.575 m).    Weight as of this encounter: 208 lb 14.4 oz (94.8 kg).  Medication Reconciliation: complete   Teodora Seymour CMA      "

## 2017-03-22 NOTE — PROGRESS NOTES
CC: Malgorzata Ramachandran is a 22 year old female here for routine prenatal visit @ 37w6d   HPI: She notes that she has been having some contractions and is concerned that she is going into labor. She has her  scheduled for 3/30/17.     This document serves as a record of the services and decisions personally performed and made by Dr. Teodora Dodson DO. It was created on her behalf by Earnestine Celis, a trained medical scribe. The creation of this document is based the provider's statements to the medical scribe.  Earnestine Celis 2017 3:25 PM    See OB flowsheet    No vaginal bleeding, no LOF, no contractions     Constitutional: healthy, alert and no distress  Cardiovascular: PMI normal. No lifts, heaves, or thrills. RRR. No murmurs, clicks gallops or rub  Respiratory: Percussion normal. Good diaphragmatic excursion. Lungs clear  Gastrointestinal: Normal pregnant Abdomen,, non-tender  Genitourinary: Normal external genitalia without lesions  Psychiatric: mentation appears normal and affect normal/bright     ASSESSMENT/PLAN:  1) doing well, likely dania maria contractions  2) Preoperative instructions provided, and  scheduled for 3/30/17  Cleared for surgery.  ry    The information in this document, created by the medical scribe for me, accurately reflects the services I personally performed and the decisions made by me. I have reviewed and approved this document for accuracy prior to leaving the patient care area.  3/22/2017 3:25 PM         Dr. Teodora Dodson DO

## 2017-03-22 NOTE — MR AVS SNAPSHOT
After Visit Summary   3/22/2017    Malgorzata Ramachandran    MRN: [id number]           Patient Information     Date Of Birth          1995        Visit Information        Provider Department      3/22/2017 3:15 PM Teodora Dodson DO [facility]        Today's Diagnoses     Prenatal care in second trimester    -  1      Care Instructions    Come to hospital at 930 am   Do not eat after 2 am       Dr. Teodora Dodson DO    Obstetrics and Gynecology  [facility] [place]               Follow-ups after your visit        Your next 10 appointments already scheduled     Mar 30, 2017   Procedure with Teodora Dodson DO   [facility] Labor and Delivery (--)    [address] [facility] [address] [phone]              Who to contact     If you have questions or need follow up information about today's clinic visit or your schedule please contact [facility] directly at [phone].  Normal or non-critical lab and imaging results will be communicated to you by Minderesthart, letter or phone within 4 business days after the clinic has received the results. If you do not hear from us within 7 days, please contact the clinic through Somotot or phone. If you have a critical or abnormal lab result, we will notify you by phone as soon as possible.  Submit refill requests through PlayhouseSquare or call your pharmacy and they will forward the refill request to us. Please allow 3 business days for your refill to be completed.          Additional Information About Your Visit        Minderesthart Information     PlayhouseSquare gives you secure access to your electronic health record. If you see a primary care provider, you can also send messages to your care team and make appointments. If you have questions, please call your primary care clinic.  If you do not have a primary care provider, please call [phone] and they will assist you.        Care  "EveryWhere ID     This is your Care EveryWhere ID. This could be used by other organizations to access your [place] medical records  [id number]        Your Vitals Were     Temperature Height Last Period BMI (Body Mass Index)          98.5  F (36.9  C) (Oral) 5' 2\" (1.575 m) 06/30/2016 (Exact Date) 38.21 kg/m2         Blood Pressure from Last 3 Encounters:   03/22/17 124/70   03/19/17 115/61   03/13/17 124/70    Weight from Last 3 Encounters:   03/22/17 208 lb 14.4 oz (94.8 kg)   03/19/17 205 lb (93 kg)   03/13/17 205 lb (93 kg)              Today, you had the following     No orders found for display       Primary Care Provider    No Pcp Confirmed       No address on file        Thank you!     Thank you for choosing [facility]  for your care. Our goal is always to provide you with excellent care. Hearing back from our patients is one way we can continue to improve our services. Please take a few minutes to complete the written survey that you may receive in the mail after your visit with us. Thank you!             Your Updated Medication List - Protect others around you: Learn how to safely use, store and throw away your medicines at www.disposemymeds.org.          This list is accurate as of: 3/22/17  3:37 PM.  Always use your most recent med list.                   Brand Name Dispense Instructions for use    amoxicillin 500 MG capsule    AMOXIL    21 capsule    Take 1 capsule (500 mg) by mouth 3 times daily for 7 days       hydrOXYzine 50 MG capsule    VISTARIL    10 capsule    Take 1-2 capsules ( mg) by mouth every 6 hours as needed for other (rest)       order for DME     1 Device    Equipment being ordered: maternal support belt       prenatal multivitamin  plus iron 27-0.8 MG Tabs per tablet     90 tablet    Take 1 tablet by mouth daily         "

## 2017-03-29 ENCOUNTER — HOSPITAL ENCOUNTER (OUTPATIENT)
Dept: LAB | Facility: CLINIC | Age: 22
Discharge: HOME | End: 2017-03-29
Attending: FAMILY MEDICINE | Admitting: FAMILY MEDICINE
Payer: MEDICAID

## 2017-03-29 DIAGNOSIS — Z01.812 PRE-OPERATIVE LABORATORY EXAMINATION: ICD-10-CM

## 2017-03-29 LAB
ABO + RH BLD: NORMAL
ABO + RH BLD: NORMAL
BLD GP AB SCN SERPL QL: NORMAL
BLOOD BANK CMNT PATIENT-IMP: NORMAL
HGB BLD-MCNC: 11.2 G/DL (ref 11.7–15.7)
SPECIMEN EXP DATE BLD: NORMAL

## 2017-03-29 PROCEDURE — 86850 RBC ANTIBODY SCREEN: CPT | Performed by: FAMILY MEDICINE

## 2017-03-29 PROCEDURE — 86900 BLOOD TYPING SEROLOGIC ABO: CPT | Performed by: FAMILY MEDICINE

## 2017-03-29 PROCEDURE — 85018 HEMOGLOBIN: CPT | Performed by: FAMILY MEDICINE

## 2017-03-29 PROCEDURE — 86780 TREPONEMA PALLIDUM: CPT | Performed by: FAMILY MEDICINE

## 2017-03-29 PROCEDURE — 86901 BLOOD TYPING SEROLOGIC RH(D): CPT | Performed by: FAMILY MEDICINE

## 2017-03-29 NOTE — PHARMACY-ADMISSION MEDICATION HISTORY
Med rec completed by pre admitting Hetal Barraza RN Tue Mar 28, 2017 10:52 AM         Prior to Admission medications    Medication Sig Last Dose Taking? Auth Provider   Prenatal Vit-Fe Fumarate-FA (PRENATAL MULTIVITAMIN  PLUS IRON) 27-0.8 MG TABS per tablet Take 1 tablet by mouth daily  Yes Parth Loja MD   hydrOXYzine (VISTARIL) 50 MG capsule Take 1-2 capsules ( mg) by mouth every 6 hours as needed for other (rest)   Suki Moody MD   order for DME Equipment being ordered: maternal support belt  Patient not taking: Reported on 2/20/2017   Parth Loja MD

## 2017-03-30 ENCOUNTER — HOSPITAL ENCOUNTER (INPATIENT)
Facility: CLINIC | Age: 22
LOS: 2 days | Discharge: HOME HEALTH | End: 2017-04-01
Attending: FAMILY MEDICINE | Admitting: FAMILY MEDICINE
Payer: MEDICAID

## 2017-03-30 ENCOUNTER — ANESTHESIA EVENT (OUTPATIENT)
Dept: OBGYN | Facility: CLINIC | Age: 22
End: 2017-03-30
Payer: MEDICAID

## 2017-03-30 ENCOUNTER — SURGERY (OUTPATIENT)
Age: 22
End: 2017-03-30

## 2017-03-30 ENCOUNTER — ANESTHESIA (OUTPATIENT)
Dept: OBGYN | Facility: CLINIC | Age: 22
End: 2017-03-30
Payer: MEDICAID

## 2017-03-30 DIAGNOSIS — Z98.891 S/P CESAREAN SECTION: Primary | ICD-10-CM

## 2017-03-30 LAB — T PALLIDUM IGG+IGM SER QL: NEGATIVE

## 2017-03-30 PROCEDURE — 59515 CESAREAN DELIVERY: CPT | Performed by: FAMILY MEDICINE

## 2017-03-30 PROCEDURE — 25000125 ZZHC RX 250: Performed by: NURSE ANESTHETIST, CERTIFIED REGISTERED

## 2017-03-30 PROCEDURE — 25000125 ZZHC RX 250: Performed by: FAMILY MEDICINE

## 2017-03-30 PROCEDURE — 71000012 ZZH RECOVERY PHASE 1 LEVEL 1 FIRST HR: Performed by: FAMILY MEDICINE

## 2017-03-30 PROCEDURE — 25000132 ZZH RX MED GY IP 250 OP 250 PS 637: Performed by: ANESTHESIOLOGY

## 2017-03-30 PROCEDURE — 25800025 ZZH RX 258: Performed by: FAMILY MEDICINE

## 2017-03-30 PROCEDURE — 25000128 H RX IP 250 OP 636: Performed by: FAMILY MEDICINE

## 2017-03-30 PROCEDURE — 25000128 H RX IP 250 OP 636: Performed by: NURSE ANESTHETIST, CERTIFIED REGISTERED

## 2017-03-30 PROCEDURE — 27210794 ZZH OR GENERAL SUPPLY STERILE: Performed by: FAMILY MEDICINE

## 2017-03-30 PROCEDURE — 12000029 ZZH R&B OB INTERMEDIATE

## 2017-03-30 PROCEDURE — 37000008 ZZH ANESTHESIA TECHNICAL FEE, 1ST 30 MIN: Performed by: FAMILY MEDICINE

## 2017-03-30 PROCEDURE — 36000058 ZZH SURGERY LEVEL 3 EA 15 ADDTL MIN: Performed by: FAMILY MEDICINE

## 2017-03-30 PROCEDURE — 25000132 ZZH RX MED GY IP 250 OP 250 PS 637: Performed by: FAMILY MEDICINE

## 2017-03-30 PROCEDURE — 36000056 ZZH SURGERY LEVEL 3 1ST 30 MIN: Performed by: FAMILY MEDICINE

## 2017-03-30 PROCEDURE — C1765 ADHESION BARRIER: HCPCS | Performed by: FAMILY MEDICINE

## 2017-03-30 PROCEDURE — 27210995 ZZH RX 272: Performed by: FAMILY MEDICINE

## 2017-03-30 PROCEDURE — 37000009 ZZH ANESTHESIA TECHNICAL FEE, EACH ADDTL 15 MIN: Performed by: FAMILY MEDICINE

## 2017-03-30 RX ORDER — NALOXONE HYDROCHLORIDE 0.4 MG/ML
.1-.4 INJECTION, SOLUTION INTRAMUSCULAR; INTRAVENOUS; SUBCUTANEOUS
Status: DISCONTINUED | OUTPATIENT
Start: 2017-03-30 | End: 2017-04-01 | Stop reason: HOSPADM

## 2017-03-30 RX ORDER — HYDROMORPHONE HYDROCHLORIDE 1 MG/ML
.3-.5 INJECTION, SOLUTION INTRAMUSCULAR; INTRAVENOUS; SUBCUTANEOUS EVERY 30 MIN PRN
Status: DISCONTINUED | OUTPATIENT
Start: 2017-03-30 | End: 2017-04-01 | Stop reason: HOSPADM

## 2017-03-30 RX ORDER — SIMETHICONE 80 MG
80 TABLET,CHEWABLE ORAL 4 TIMES DAILY PRN
Status: DISCONTINUED | OUTPATIENT
Start: 2017-03-30 | End: 2017-04-01 | Stop reason: HOSPADM

## 2017-03-30 RX ORDER — ACETAMINOPHEN 500 MG
1000 TABLET ORAL ONCE
Status: COMPLETED | OUTPATIENT
Start: 2017-03-30 | End: 2017-03-30

## 2017-03-30 RX ORDER — ACETAMINOPHEN 325 MG/1
975 TABLET ORAL EVERY 6 HOURS PRN
Status: DISCONTINUED | OUTPATIENT
Start: 2017-03-30 | End: 2017-03-31

## 2017-03-30 RX ORDER — DIPHENHYDRAMINE HYDROCHLORIDE 50 MG/ML
25 INJECTION INTRAMUSCULAR; INTRAVENOUS EVERY 6 HOURS PRN
Status: DISCONTINUED | OUTPATIENT
Start: 2017-03-30 | End: 2017-04-01 | Stop reason: HOSPADM

## 2017-03-30 RX ORDER — DIPHENHYDRAMINE HCL 25 MG
25 CAPSULE ORAL EVERY 6 HOURS PRN
Status: DISCONTINUED | OUTPATIENT
Start: 2017-03-30 | End: 2017-04-01 | Stop reason: HOSPADM

## 2017-03-30 RX ORDER — EPHEDRINE SULFATE 50 MG/ML
INJECTION, SOLUTION INTRAVENOUS PRN
Status: DISCONTINUED | OUTPATIENT
Start: 2017-03-30 | End: 2017-03-30

## 2017-03-30 RX ORDER — KETOROLAC TROMETHAMINE 30 MG/ML
INJECTION, SOLUTION INTRAMUSCULAR; INTRAVENOUS PRN
Status: DISCONTINUED | OUTPATIENT
Start: 2017-03-30 | End: 2017-03-30

## 2017-03-30 RX ORDER — OXYCODONE HYDROCHLORIDE 5 MG/1
5-10 TABLET ORAL
Status: DISCONTINUED | OUTPATIENT
Start: 2017-03-30 | End: 2017-04-01 | Stop reason: HOSPADM

## 2017-03-30 RX ORDER — ONDANSETRON 2 MG/ML
4 INJECTION INTRAMUSCULAR; INTRAVENOUS EVERY 30 MIN PRN
Status: DISCONTINUED | OUTPATIENT
Start: 2017-03-30 | End: 2017-03-30 | Stop reason: HOSPADM

## 2017-03-30 RX ORDER — LANOLIN 100 %
OINTMENT (GRAM) TOPICAL
Status: DISCONTINUED | OUTPATIENT
Start: 2017-03-30 | End: 2017-04-01 | Stop reason: HOSPADM

## 2017-03-30 RX ORDER — HYDROCORTISONE 2.5 %
CREAM (GRAM) TOPICAL 3 TIMES DAILY PRN
Status: DISCONTINUED | OUTPATIENT
Start: 2017-03-30 | End: 2017-04-01 | Stop reason: HOSPADM

## 2017-03-30 RX ORDER — ONDANSETRON 2 MG/ML
4 INJECTION INTRAMUSCULAR; INTRAVENOUS EVERY 6 HOURS PRN
Status: DISCONTINUED | OUTPATIENT
Start: 2017-03-30 | End: 2017-04-01 | Stop reason: HOSPADM

## 2017-03-30 RX ORDER — ACETAMINOPHEN 325 MG/1
650 TABLET ORAL EVERY 4 HOURS PRN
Status: DISCONTINUED | OUTPATIENT
Start: 2017-04-02 | End: 2017-04-01 | Stop reason: HOSPADM

## 2017-03-30 RX ORDER — SODIUM CHLORIDE, SODIUM LACTATE, POTASSIUM CHLORIDE, CALCIUM CHLORIDE 600; 310; 30; 20 MG/100ML; MG/100ML; MG/100ML; MG/100ML
INJECTION, SOLUTION INTRAVENOUS CONTINUOUS
Status: DISCONTINUED | OUTPATIENT
Start: 2017-03-30 | End: 2017-03-30

## 2017-03-30 RX ORDER — LIDOCAINE 40 MG/G
CREAM TOPICAL
Status: DISCONTINUED | OUTPATIENT
Start: 2017-03-30 | End: 2017-04-01 | Stop reason: HOSPADM

## 2017-03-30 RX ORDER — AMOXICILLIN 250 MG
1-2 CAPSULE ORAL 2 TIMES DAILY
Status: DISCONTINUED | OUTPATIENT
Start: 2017-03-30 | End: 2017-04-01 | Stop reason: HOSPADM

## 2017-03-30 RX ORDER — NALOXONE HYDROCHLORIDE 0.4 MG/ML
.1-.4 INJECTION, SOLUTION INTRAMUSCULAR; INTRAVENOUS; SUBCUTANEOUS
Status: DISCONTINUED | OUTPATIENT
Start: 2017-03-30 | End: 2017-03-30

## 2017-03-30 RX ORDER — ONDANSETRON 2 MG/ML
4 INJECTION INTRAMUSCULAR; INTRAVENOUS EVERY 4 HOURS PRN
Status: DISCONTINUED | OUTPATIENT
Start: 2017-03-30 | End: 2017-03-30

## 2017-03-30 RX ORDER — MISOPROSTOL 200 UG/1
400 TABLET ORAL
Status: DISCONTINUED | OUTPATIENT
Start: 2017-03-30 | End: 2017-04-01 | Stop reason: HOSPADM

## 2017-03-30 RX ORDER — MORPHINE SULFATE 1 MG/ML
INJECTION, SOLUTION EPIDURAL; INTRATHECAL; INTRAVENOUS PRN
Status: DISCONTINUED | OUTPATIENT
Start: 2017-03-30 | End: 2017-03-30

## 2017-03-30 RX ORDER — OXYTOCIN/0.9 % SODIUM CHLORIDE 30/500 ML
340 PLASTIC BAG, INJECTION (ML) INTRAVENOUS CONTINUOUS PRN
Status: DISCONTINUED | OUTPATIENT
Start: 2017-03-30 | End: 2017-04-01 | Stop reason: HOSPADM

## 2017-03-30 RX ORDER — CARBOPROST TROMETHAMINE 250 UG/ML
INJECTION, SOLUTION INTRAMUSCULAR
Status: DISCONTINUED
Start: 2017-03-30 | End: 2017-03-30 | Stop reason: WASHOUT

## 2017-03-30 RX ORDER — KETOROLAC TROMETHAMINE 30 MG/ML
30 INJECTION, SOLUTION INTRAMUSCULAR; INTRAVENOUS EVERY 6 HOURS
Status: DISPENSED | OUTPATIENT
Start: 2017-03-30 | End: 2017-03-31

## 2017-03-30 RX ORDER — CEFAZOLIN SODIUM 1 G/3ML
1 INJECTION, POWDER, FOR SOLUTION INTRAMUSCULAR; INTRAVENOUS
Status: DISCONTINUED | OUTPATIENT
Start: 2017-03-30 | End: 2017-03-30

## 2017-03-30 RX ORDER — BUPIVACAINE HYDROCHLORIDE 7.5 MG/ML
INJECTION, SOLUTION INTRASPINAL PRN
Status: DISCONTINUED | OUTPATIENT
Start: 2017-03-30 | End: 2017-03-30

## 2017-03-30 RX ORDER — MAGNESIUM HYDROXIDE 1200 MG/15ML
LIQUID ORAL PRN
Status: DISCONTINUED | OUTPATIENT
Start: 2017-03-30 | End: 2017-04-01 | Stop reason: HOSPADM

## 2017-03-30 RX ORDER — IBUPROFEN 400 MG/1
400-800 TABLET, FILM COATED ORAL EVERY 6 HOURS PRN
Status: DISCONTINUED | OUTPATIENT
Start: 2017-03-31 | End: 2017-03-31

## 2017-03-30 RX ORDER — ONDANSETRON 4 MG/1
4 TABLET, ORALLY DISINTEGRATING ORAL EVERY 30 MIN PRN
Status: DISCONTINUED | OUTPATIENT
Start: 2017-03-30 | End: 2017-03-30 | Stop reason: HOSPADM

## 2017-03-30 RX ORDER — OXYTOCIN/0.9 % SODIUM CHLORIDE 30/500 ML
PLASTIC BAG, INJECTION (ML) INTRAVENOUS PRN
Status: DISCONTINUED | OUTPATIENT
Start: 2017-03-30 | End: 2017-03-30

## 2017-03-30 RX ORDER — DEXTROSE, SODIUM CHLORIDE, SODIUM LACTATE, POTASSIUM CHLORIDE, AND CALCIUM CHLORIDE 5; .6; .31; .03; .02 G/100ML; G/100ML; G/100ML; G/100ML; G/100ML
INJECTION, SOLUTION INTRAVENOUS CONTINUOUS
Status: DISCONTINUED | OUTPATIENT
Start: 2017-03-30 | End: 2017-04-01 | Stop reason: HOSPADM

## 2017-03-30 RX ORDER — OXYTOCIN/0.9 % SODIUM CHLORIDE 30/500 ML
100 PLASTIC BAG, INJECTION (ML) INTRAVENOUS CONTINUOUS
Status: DISCONTINUED | OUTPATIENT
Start: 2017-03-30 | End: 2017-04-01 | Stop reason: HOSPADM

## 2017-03-30 RX ORDER — CEFAZOLIN SODIUM 2 G/100ML
2 INJECTION, SOLUTION INTRAVENOUS
Status: COMPLETED | OUTPATIENT
Start: 2017-03-30 | End: 2017-03-30

## 2017-03-30 RX ORDER — FENTANYL CITRATE 50 UG/ML
25-50 INJECTION, SOLUTION INTRAMUSCULAR; INTRAVENOUS
Status: DISCONTINUED | OUTPATIENT
Start: 2017-03-30 | End: 2017-03-30 | Stop reason: HOSPADM

## 2017-03-30 RX ORDER — ONDANSETRON 2 MG/ML
INJECTION INTRAMUSCULAR; INTRAVENOUS PRN
Status: DISCONTINUED | OUTPATIENT
Start: 2017-03-30 | End: 2017-03-30

## 2017-03-30 RX ORDER — BISACODYL 10 MG
10 SUPPOSITORY, RECTAL RECTAL DAILY PRN
Status: DISCONTINUED | OUTPATIENT
Start: 2017-04-01 | End: 2017-04-01 | Stop reason: HOSPADM

## 2017-03-30 RX ORDER — NALBUPHINE HYDROCHLORIDE 10 MG/ML
2.5-5 INJECTION, SOLUTION INTRAMUSCULAR; INTRAVENOUS; SUBCUTANEOUS EVERY 6 HOURS PRN
Status: DISCONTINUED | OUTPATIENT
Start: 2017-03-30 | End: 2017-03-30

## 2017-03-30 RX ORDER — OXYTOCIN 10 [USP'U]/ML
10 INJECTION, SOLUTION INTRAMUSCULAR; INTRAVENOUS
Status: DISCONTINUED | OUTPATIENT
Start: 2017-03-30 | End: 2017-04-01 | Stop reason: HOSPADM

## 2017-03-30 RX ORDER — SODIUM CHLORIDE, SODIUM LACTATE, POTASSIUM CHLORIDE, CALCIUM CHLORIDE 600; 310; 30; 20 MG/100ML; MG/100ML; MG/100ML; MG/100ML
INJECTION, SOLUTION INTRAVENOUS CONTINUOUS
Status: DISCONTINUED | OUTPATIENT
Start: 2017-03-30 | End: 2017-03-30 | Stop reason: HOSPADM

## 2017-03-30 RX ADMIN — PHENYLEPHRINE HYDROCHLORIDE 100 MCG: 10 INJECTION, SOLUTION INTRAMUSCULAR; INTRAVENOUS; SUBCUTANEOUS at 11:40

## 2017-03-30 RX ADMIN — OXYTOCIN-SODIUM CHLORIDE 0.9% IV SOLN 30 UNIT/500ML 1 ML: 30-0.9/5 SOLUTION at 11:57

## 2017-03-30 RX ADMIN — KETOROLAC TROMETHAMINE 30 MG: 30 INJECTION, SOLUTION INTRAMUSCULAR at 12:37

## 2017-03-30 RX ADMIN — ACETAMINOPHEN 1000 MG: 500 TABLET, FILM COATED ORAL at 10:36

## 2017-03-30 RX ADMIN — KETOROLAC TROMETHAMINE 30 MG: 30 INJECTION, SOLUTION INTRAMUSCULAR at 19:49

## 2017-03-30 RX ADMIN — HYDROMORPHONE HYDROCHLORIDE 0.5 MG: 1 INJECTION, SOLUTION INTRAMUSCULAR; INTRAVENOUS; SUBCUTANEOUS at 16:09

## 2017-03-30 RX ADMIN — SODIUM CITRATE AND CITRIC ACID MONOHYDRATE 30 ML: 500; 334 SOLUTION ORAL at 11:12

## 2017-03-30 RX ADMIN — SODIUM CHLORIDE, POTASSIUM CHLORIDE, SODIUM LACTATE AND CALCIUM CHLORIDE 1000 ML: 600; 310; 30; 20 INJECTION, SOLUTION INTRAVENOUS at 09:36

## 2017-03-30 RX ADMIN — ONDANSETRON 4 MG: 2 INJECTION INTRAMUSCULAR; INTRAVENOUS at 11:38

## 2017-03-30 RX ADMIN — SODIUM CHLORIDE, SODIUM LACTATE, POTASSIUM CHLORIDE, CALCIUM CHLORIDE AND DEXTROSE MONOHYDRATE: 5; 600; 310; 30; 20 INJECTION, SOLUTION INTRAVENOUS at 18:30

## 2017-03-30 RX ADMIN — ACETAMINOPHEN 975 MG: 325 TABLET, FILM COATED ORAL at 23:30

## 2017-03-30 RX ADMIN — SODIUM CHLORIDE, POTASSIUM CHLORIDE, SODIUM LACTATE AND CALCIUM CHLORIDE: 600; 310; 30; 20 INJECTION, SOLUTION INTRAVENOUS at 10:00

## 2017-03-30 RX ADMIN — OXYTOCIN-SODIUM CHLORIDE 0.9% IV SOLN 30 UNIT/500ML 100 ML/HR: 30-0.9/5 SOLUTION at 13:19

## 2017-03-30 RX ADMIN — MORPHINE SULFATE 0.3 MG: 1 INJECTION EPIDURAL; INTRATHECAL; INTRAVENOUS at 11:34

## 2017-03-30 RX ADMIN — ONDANSETRON 4 MG: 2 INJECTION INTRAMUSCULAR; INTRAVENOUS at 16:09

## 2017-03-30 RX ADMIN — SODIUM CHLORIDE, POTASSIUM CHLORIDE, SODIUM LACTATE AND CALCIUM CHLORIDE: 600; 310; 30; 20 INJECTION, SOLUTION INTRAVENOUS at 11:50

## 2017-03-30 RX ADMIN — CEFAZOLIN SODIUM 2 G: 2 INJECTION, SOLUTION INTRAVENOUS at 11:31

## 2017-03-30 RX ADMIN — BUPIVACAINE HYDROCHLORIDE IN DEXTROSE 10.5 MG: 7.5 INJECTION, SOLUTION SUBARACHNOID at 11:34

## 2017-03-30 RX ADMIN — SODIUM CHLORIDE 500 ML: 900 IRRIGANT IRRIGATION at 12:22

## 2017-03-30 RX ADMIN — EPHEDRINE SULFATE 5 MG: 50 INJECTION INTRAMUSCULAR; INTRAVENOUS; SUBCUTANEOUS at 11:46

## 2017-03-30 NOTE — IP AVS SNAPSHOT
[facility] Postpartum    [address] [facility] [address]    Phone:  [phone]    Fax:  [phone]                                       After Visit Summary   3/30/2017    Malgorzata Ramachandran    MRN: [id number]           After Visit Summary Signature Page     I have received my discharge instructions, and my questions have been answered. I have discussed any challenges I see with this plan with the nurse or doctor.    ..........................................................................................................................................  Patient/Patient Representative Signature      ..........................................................................................................................................  Patient Representative Print Name and Relationship to Patient    ..................................................               ................................................  Date                                            Time    ..........................................................................................................................................  Reviewed by Signature/Title    ...................................................              ..............................................  Date                                                            Time

## 2017-03-30 NOTE — OP NOTE
PREOPERATIVE DIAGNOSIS: A 22-year-old  at 39w0d  weeks estimated gestational age admitted for repeat  section.  POSTOPERATIVE DIAGNOSES: A 22-year-old  at 39w0d  weeks estimated gestational age admitted for repeat  section.  Surgeon:  Dr. Teodora Dosdon   Assistant:  Veronica Baker SA   PROCEDURE:   1. Repeat low transverse  section.   2. Seprafilm placement for adhesiolysis.   COMPLICATIONS: None apparent at time of procedure.   ESTIMATED BLOOD LOSS: 500  mL.   SURGEONS: Teodora Dodson DO.   INDICATIONS:A 22-year-old  at 39w0d  weeks estimated gestational age admitted for repeat  section.   Patient signed consent for repeat  section, risks benefits, alternatives are discussed with the patient.    OUTCOME: female infant 129 oz or 8 pounds apgars 9 at 1 minute and 9 at 5 minutes.   Findings:   Normal uterus, tubes and ovaries.   DETAILS OF PROCEDURE: After informed consent was signed, the patient was placed in dorsal supine position, spinal  placed for anesthesia. Fetal heart tones were obtained and found to be in 150s. The patient was prepped and draped in normal sterile fashion and a time out was performed. Adequate anesthesia was reported per patient after the spinal was dosed to a surgical level.  A pfannensteil incision was then carried down to the underlying fascia and incised in the midline. Blunt/sharp dissection of the peritoneum was performed and blunt stretching of the muscles was perfomed. The uterus was visualized. The peritoneum was visualized, bladder blade was placed, The vesicouterine peritoneum incised and a bladder flap created. Very thin lower uterine segment noted.  The bladder blade was replaced. Lower uterine segment was incised with a scalpel. Blunt stretching was performed and the baby delivered atraumatically. The cord was clamped and cut. The nose and mouth were bulb suctioned. Infant was taken over to the waiting   staff. The placenta spontaneously delivered. The uterus was cleared of all clots and debris. The placenta was examined and found to be complete.  The lower uterine segment was reapproximated with 0 Monocryl in a running locked fashion. A second layer of the same suture was used for imbrication.  Irrigation was performed. Seprafilm was placed over the bladder flap. Hemostasis was noted. The peritoneum was closed with 3-0 monocryl. The rectus muscles were reapproximated in the midline and closed with several horizontal mattress sutures of 3-0 monocryl  Hemostasis was assured with Bovie cautery on the rectus muscles, and Seprafilm was placed over it. The fascia was reapproximated with 0 looped PDS in a running locked fashion with good reapproximation. A subcuticular stitch using 4-0 Monocryl was used to reapproximate the skin. Steri-Strips, telfa, and tegaderm was applied. The patient tolerated the procedure well. Sponge, lap and needle counts were correct x2.   TIAGO MARQUEZ DO

## 2017-03-30 NOTE — ANESTHESIA PROCEDURE NOTES
Peripheral nerve/Neuraxial procedure note : intrathecal  Pre-Procedure  Performed by ASAF JACKSON  Location: OR, OB      Pre-Anesthestic Checklist: patient identified, IV checked, risks and benefits discussed, informed consent, monitors and equipment checked and pre-op evaluation    Timeout  Correct Patient: Yes   Correct Procedure: Yes   Correct Site: Yes   Correct Laterality: N/A   Correct Position: Yes   Site Marked: No   .   Procedure Documentation  ASA 2  .    Procedure:    Intrathecal.  Insertion Site:L3-4  (midline approach)      Patient Prep;mask, sterile gloves.  .  Needle: (). # of attempts: 2. # of redirects:. Spinal Needle: Sprotte 24 G. 3.5 in.  No introducer used. . .     Assessment/Narrative  Paresthesias: No.  .  .  . Time Injected: 11:34  Comments:  [id number], lot [id number], exp. 2018-06-01    10.5 mg bupivicaine and 0.3 mg morphine placed.

## 2017-03-30 NOTE — ANESTHESIA PREPROCEDURE EVALUATION
PAC NOTE:       ANESTHESIA PRE EVALUATION:  Anesthesia Evaluation     . Pt has had prior anesthetic. Type: General and Regional    No history of anesthetic complications          ROS/MED HX    ENT/Pulmonary:  - neg pulmonary ROS     Neurologic:  - neg neurologic ROS     Cardiovascular:  - neg cardiovascular ROS       METS/Exercise Tolerance:     Hematologic:  - neg hematologic  ROS       Musculoskeletal:  - neg musculoskeletal ROS       GI/Hepatic:  - neg GI/hepatic ROS       Renal/Genitourinary:  - ROS Renal section negative       Endo:  - neg endo ROS       Psychiatric:  - neg psychiatric ROS       Infectious Disease:  - neg infectious disease ROS       Malignancy:      - no malignancy   Other:    (+) Possibly pregnant C-spine cleared: N/A, no H/O Chronic Pain,no other significant disability                    Physical Exam  Normal systems: cardiovascular, pulmonary and dental    Airway   Mallampati: IV  TM distance: >3 FB  Neck ROM: full    Dental     Cardiovascular       Pulmonary              Anesthesia Plan      History & Physical Review  History and physical reviewed and following examination; no interval change.    ASA Status:  2 .    NPO Status:  > 8 hours    Plan for Spinal   PONV prophylaxis:  Ondansetron (or other 5HT-3)  Evaluation performed prior to anesthetic, entered late.      Postoperative Care  Postoperative pain management:  IV analgesics, Oral pain medications and Neuraxial analgesia.      Consents  Anesthetic plan, risks, benefits and alternatives discussed with:  Patient..                            .

## 2017-03-30 NOTE — ANESTHESIA CARE TRANSFER NOTE
Patient: Malgorzata Ramachandran    Procedure(s):  Repeat  section  - Wound Class: I-Clean    Diagnosis: Previous  Section   Diagnosis Additional Information: No value filed.    Anesthesia Type:   Spinal     Note:  Airway :Room Air  Patient transferred to:Labor and Delivery  Comments: VSS, report to RN.      Vitals: (Last set prior to Anesthesia Care Transfer)    CRNA VITALS  3/30/2017 1209 - 3/30/2017 1243      3/30/2017             Pulse: 68    SpO2: 96 %                Electronically Signed By: RAMSEY Adams CRNA  2017  12:43 PM

## 2017-03-30 NOTE — ANESTHESIA POSTPROCEDURE EVALUATION
Patient: Malgorzata Ramachandran    Procedure(s):  Repeat  section  - Wound Class: I-Clean    Diagnosis:Previous  Section   Diagnosis Additional Information: Previous CS, delivered.    Anesthesia Type:  Spinal    Note:  Anesthesia Post Evaluation    Patient location during evaluation: Bedside and OB PACU  Patient participation: Able to fully participate in evaluation  Level of consciousness: awake and alert  Pain management: adequate  Airway patency: patent  Cardiovascular status: acceptable  Respiratory status: acceptable  Hydration status: acceptable  PONV: none     Anesthetic complications: None          Last vitals:  Vitals:    17 0930 17 1118 17 1242   BP: 132/73 134/69 122/59   Pulse:  82    Resp:  20    Temp: 99.1  F (37.3  C)  97.7  F (36.5  C)   SpO2:   97%         Electronically Signed By: Anoop Soares MD  2017  12:58 PM

## 2017-03-30 NOTE — IP AVS SNAPSHOT
MRN:[id number]                      After Visit Summary   3/30/2017    Malgorzata Ramachandran    MRN: [id number]           Thank you!     Thank you for choosing [facility] for your care. Our goal is always to provide you with excellent care. Hearing back from our patients is one way we can continue to improve our services. Please take a few minutes to complete the written survey that you may receive in the mail after you visit. If you would like to speak to someone directly about your visit please contact Patient Relations at [phone]. Thank you!          Patient Information     Date Of Birth          1995        About your hospital stay     You were admitted on:  2017 You last received care in the:  [facility] Postpartum    You were discharged on:  2017       Who to Call     For medical emergencies, please call 911.  For non-urgent questions about your medical care, please call your primary care provider or clinic, None  For questions related to your surgery, please call your surgery clinic        Attending Provider     Provider Specialty    Teodora Dodson,  OB/Gyn       Primary Care Provider    No Pcp Confirmed       No address on file        After Care Instructions     Activity       Review discharge instructions            Diet       Resume previous diet            Discharge Instructions - Gestational diabetic patients       Gestational diabetic patients to follow up for fasting blood sugar and 2 hour 75gm glucose load at 6 weeks postpartum.            Discharge Instructions - Postpartum visit       Schedule postpartum visit with your provider and return to clinic in 2 weeks.                  Further instructions from your care team       Postop  Birth Instructions  Follow up in clinic in 2 weeks.  [facility]: [phone]  Lactation: [phone]    Activity       Do not lift more than 10 pounds for 6 weeks after surgery.   Ask family and friends for help when you need it.    No driving until you have stopped taking your pain medications (usually two weeks after surgery).    No heavy exercise or activity for 6 weeks.  Don't do anything that will put a strain on your surgery site.    Don't strain when using the toilet.  Your care team may prescribe a stool softener if you have problems with your bowel movements.     To care for your incision:       Keep the incision clean and dry.    Do not soak your incision in water. No swimming or hot tubs until it has fully healed. You may soak in the bathtub if the water level is below your incision.    Do not use peroxide, gel, cream, lotion, or ointment on your incision.    Adjust your clothes to avoid pressure on your surgery site (check the elastic in your underwear for example).     You may see a small amount of clear or pink drainage and this is normal.  Check with your health care provider:       If the drainage increases or has an odor.    If the incision reddens, you have swelling, or develop a rash.    If you have increased pain and the medicine we prescribed doesn't help.    If you have a fever above 100.4 F (38 C) with or without chills when placing thermometer under your tongue.   The area around your incision (surgery wound), will feel numb.  This is normal. The numbness should go away in less than a year.     Keep your hands clean:  Always wash your hands before touching your incision (surgery wound). This helps reduce your risk of infection. If your hands aren't dirty, you may use an alcohol hand-rub to clean your hands. Keep your nails clean and short.    Call your healthcare provider if you have any of these symptoms:       You soak a sanitary pad with blood within 1 hour, or you see blood clots larger than a golf ball.    Bleeding that lasts more than 6 weeks.    Vaginal discharge that smells bad.    Severe pain, cramping or tenderness in your lower belly area.    A need to  urinate more frequently (use the toilet more often), more urgently (use the toilet very quickly), or it burns when you urinate.    Nausea and vomiting.    Redness, swelling or pain around a vein in your leg.    Problems breastfeeding or a red or painful area on your breast.    Chest pain and cough or are gasping for air.    Problems with coping with sadness, anxiety or depression. If you have concerns about hurting yourself or the baby, call your provider immediately.      You have questions or concerns after you return home.                  Pending Results     No orders found from 3/28/2017 to 3/31/2017.            Statement of Approval     Ordered          04/01/17 0902  I have reviewed and agree with all the recommendations and orders detailed in this document.  EFFECTIVE NOW     Approved and electronically signed by:  Parth Loja MD             Admission Information     Date & Time Provider Department Dept. Phone    3/30/2017 Teodora Dodson,  [facility] Postpartum [phone]      Your Vitals Were     Blood Pressure Pulse Temperature Respirations Last Period Pulse Oximetry    126/62 85 98.7  F (37.1  C) (Oral) 20 06/30/2016 (Exact Date) 99%      MyChart Information     [employer] gives you secure access to your electronic health record. If you see a primary care provider, you can also send messages to your care team and make appointments. If you have questions, please call your primary care clinic.  If you do not have a primary care provider, please call [phone] and they will assist you.        Care EveryWhere ID     This is your Care EveryWhere ID. This could be used by other organizations to access your [place] medical records  [id number]           Review of your medicines      START taking        Dose / Directions    ibuprofen 400 MG tablet   Commonly known as:  ADVIL/MOTRIN        Dose:  400-800 mg   Take 1-2 tablets (400-800 mg) by mouth every 6 hours as needed for other  (cramping)   Quantity:  120 tablet   Refills:  1       oxyCODONE 5 MG IR tablet   Commonly known as:  ROXICODONE        Dose:  5-10 mg   Take 1-2 tablets (5-10 mg) by mouth every 3 hours as needed for moderate to severe pain   Quantity:  30 tablet   Refills:  0       senna-docusate 8.6-50 MG per tablet   Commonly known as:  SENOKOT-S;PERICOLACE        Dose:  1-2 tablet   Take 1-2 tablets by mouth 2 times daily   Quantity:  100 tablet   Refills:  1         CONTINUE these medicines which have NOT CHANGED        Dose / Directions    hydrOXYzine 50 MG capsule   Commonly known as:  VISTARIL   Used for:  Encounter for triage in pregnant patient        Dose:   mg   Take 1-2 capsules ( mg) by mouth every 6 hours as needed for other (rest)   Quantity:  10 capsule   Refills:  0       order for DME   Used for:  Bilateral low back pain with left-sided sciatica, unspecified chronicity, High-risk pregnancy, young multigravida, third trimester        Equipment being ordered: maternal support belt   Quantity:  1 Device   Refills:  1       prenatal multivitamin  plus iron 27-0.8 MG Tabs per tablet   Used for:  Previous  delivery, antepartum condition or complication, High-risk pregnancy, young multigravida, third trimester        Dose:  1 tablet   Take 1 tablet by mouth daily   Quantity:  90 tablet   Refills:  3            Where to get your medicines      Some of these will need a paper prescription and others can be bought over the counter. Ask your nurse if you have questions.     Bring a paper prescription for each of these medications     ibuprofen 400 MG tablet    oxyCODONE 5 MG IR tablet    senna-docusate 8.6-50 MG per tablet                Protect others around you: Learn how to safely use, store and throw away your medicines at www.disposemymeds.org.             Medication List: This is a list of all your medications and when to take them. Check marks below indicate your daily home schedule. Keep this  list as a reference.      Medications           Morning Afternoon Evening Bedtime As Needed    hydrOXYzine 50 MG capsule   Commonly known as:  VISTARIL   Take 1-2 capsules ( mg) by mouth every 6 hours as needed for other (rest)                                   ibuprofen 400 MG tablet   Commonly known as:  ADVIL/MOTRIN   Take 1-2 tablets (400-800 mg) by mouth every 6 hours as needed for other (cramping)   Last time this was given:  800 mg on 4/1/2017  4:00 AM                                   order for DME   Equipment being ordered: maternal support belt                                oxyCODONE 5 MG IR tablet   Commonly known as:  ROXICODONE   Take 1-2 tablets (5-10 mg) by mouth every 3 hours as needed for moderate to severe pain   Last time this was given:  10 mg on 4/1/2017  5:32 AM                                   prenatal multivitamin  plus iron 27-0.8 MG Tabs per tablet   Take 1 tablet by mouth daily                                   senna-docusate 8.6-50 MG per tablet   Commonly known as:  SENOKOT-S;PERICOLACE   Take 1-2 tablets by mouth 2 times daily   Last time this was given:  1 tablet on 3/31/2017  9:48 PM

## 2017-03-30 NOTE — L&D DELIVERY NOTE
"Delivery Summary    Malgorzata Ramachandran MRN# [id number]   Age: 22 year old YOB: 1995     ASSESSMENT & PLAN: 21 y/o  for repeat CS.          Labor Length    2nd Stage (hrs):  0 (min):  0      Rupture date/time:     Rupture type:  Artificial Rupture of Membranes      Delivery/Placenta Date and Time    Delivery Date:  3/30/17 Delivery Time:  11:56 AM      Apgars    Living status:  Yes    1 Minute 5 Minute 10 Minute 15 Minute 20 Minute   Skin color: 1  1       Heart rate: 2  2       Reflex irritability: 2  2       Muscle tone: 2  2       Respiratory effort: 2  2       Total: 9  9          Apgars assigned by:  KELLY KAMINSKI RNC      Cord    Vessels:  3 Vessels Complications:  None   Cord Blood Disposition:  Lab Gases Sent?:  No         [place] Resuscitation    Methods:  None          Measurements    Weight:  129.1 oz Length:  20\"   Head circumference:  0.343 m       Skin to Skin and Feeding Plan    Skin to skin initiation date/time: 3/30/17 1208   Skin to skin with:  Mother   Skin to skin end date/time:        Labor Events and Shoulder Dystocia    Fetal Tracing Prior to Delivery:  Category 1   Shoulder dystocia present?:  Neg            Delivery (Maternal) (Provider to Complete) (880032)    Episiotomy:  None         Mother's Information  Mother: Malgorzata Morgan #[id number]    Start of Mother's Information     IO Blood Loss  17 1150 - 17 1244    Mom's I/O Activity            End of Mother's Information  Mother: Malgorzata Morgan #[id number]            Delivery - Provider to Complete (791701)    Delivery Type (Choose the 1 that will go to the Birth History):  , Low Transverse                      Specifics:  Repeat   Indications for Repeat:  39+ weeks /Planned repeat   Other personnel:   Provider Role   TIAGO MARQUEZ    Delayed Cord Clamping:  Done   Removal:  Spontaneous   Disposition:  Hospital disposal      Anesthesia "    Method:  Spinal         Presentation and Position    Presentation:  Vertex   Position:  Left Occiput Anterior                    Teodora Dodson DO

## 2017-03-31 LAB — HGB BLD-MCNC: 10 G/DL (ref 11.7–15.7)

## 2017-03-31 PROCEDURE — 12000027 ZZH R&B OB

## 2017-03-31 PROCEDURE — 25000132 ZZH RX MED GY IP 250 OP 250 PS 637: Performed by: OBSTETRICS & GYNECOLOGY

## 2017-03-31 PROCEDURE — 85018 HEMOGLOBIN: CPT | Performed by: FAMILY MEDICINE

## 2017-03-31 PROCEDURE — 36415 COLL VENOUS BLD VENIPUNCTURE: CPT | Performed by: FAMILY MEDICINE

## 2017-03-31 PROCEDURE — 25000132 ZZH RX MED GY IP 250 OP 250 PS 637: Performed by: FAMILY MEDICINE

## 2017-03-31 PROCEDURE — 25000128 H RX IP 250 OP 636: Performed by: FAMILY MEDICINE

## 2017-03-31 RX ORDER — IBUPROFEN 400 MG/1
400-800 TABLET, FILM COATED ORAL EVERY 6 HOURS PRN
Status: DISCONTINUED | OUTPATIENT
Start: 2017-03-31 | End: 2017-04-01 | Stop reason: HOSPADM

## 2017-03-31 RX ORDER — ACETAMINOPHEN 325 MG/1
975 TABLET ORAL EVERY 8 HOURS SCHEDULED
Status: DISCONTINUED | OUTPATIENT
Start: 2017-03-31 | End: 2017-04-01 | Stop reason: HOSPADM

## 2017-03-31 RX ORDER — IBUPROFEN 400 MG/1
400-800 TABLET, FILM COATED ORAL EVERY 6 HOURS PRN
Status: DISCONTINUED | OUTPATIENT
Start: 2017-03-31 | End: 2017-03-31

## 2017-03-31 RX ADMIN — ACETAMINOPHEN 975 MG: 325 TABLET, FILM COATED ORAL at 21:48

## 2017-03-31 RX ADMIN — KETOROLAC TROMETHAMINE 30 MG: 30 INJECTION, SOLUTION INTRAMUSCULAR at 07:58

## 2017-03-31 RX ADMIN — IBUPROFEN 800 MG: 400 TABLET ORAL at 21:48

## 2017-03-31 RX ADMIN — SENNOSIDES AND DOCUSATE SODIUM 2 TABLET: 8.6; 5 TABLET ORAL at 07:59

## 2017-03-31 RX ADMIN — ACETAMINOPHEN 975 MG: 325 TABLET, FILM COATED ORAL at 05:25

## 2017-03-31 RX ADMIN — OXYCODONE HYDROCHLORIDE 5 MG: 5 TABLET ORAL at 12:29

## 2017-03-31 RX ADMIN — SENNOSIDES AND DOCUSATE SODIUM 1 TABLET: 8.6; 5 TABLET ORAL at 21:48

## 2017-03-31 RX ADMIN — OXYCODONE HYDROCHLORIDE 10 MG: 5 TABLET ORAL at 05:24

## 2017-03-31 RX ADMIN — IBUPROFEN 800 MG: 400 TABLET ORAL at 16:08

## 2017-03-31 RX ADMIN — OXYCODONE HYDROCHLORIDE 10 MG: 5 TABLET ORAL at 20:04

## 2017-03-31 RX ADMIN — OXYCODONE HYDROCHLORIDE 5 MG: 5 TABLET ORAL at 15:08

## 2017-03-31 RX ADMIN — ACETAMINOPHEN 975 MG: 325 TABLET, FILM COATED ORAL at 15:08

## 2017-03-31 RX ADMIN — OXYCODONE HYDROCHLORIDE 10 MG: 5 TABLET ORAL at 23:14

## 2017-03-31 RX ADMIN — KETOROLAC TROMETHAMINE 30 MG: 30 INJECTION, SOLUTION INTRAMUSCULAR at 01:17

## 2017-03-31 NOTE — PROVIDER NOTIFICATION
03/31/17 0748   Provider Notification   Provider Name/Title Dr Vences   Method of Notification Phone   Request Evaluate-Remote   Notification Reason Medication Request   Discussed Tylenol order. Need to change from Tylenol 975mg Q 6hrs PRN  to 975mg Q 8hr scheduled.

## 2017-03-31 NOTE — PROGRESS NOTES
[facility] Obstetrics Post-Op / Progress Note         Assessment and Plan:    Assessment:   Post-operative day #1  Low transverse repeat  section  L&D complications: Previous  Section       Doing well.  Clean wound without signs of infection.  Normal healing wound.  No immediate surgical complications identified.  No excessive bleeding  Pain well-controlled.      Plan:   Ambulation encouraged  Breast feeding strategies discussed  Monitor wound for signs of infection  Pain control measures as needed  Reportable signs and symptoms dicussed with the patient           Interval History:   Doing well.  Pain is well-controlled.  No fevers.  No history of wound drainage, warmth or significant erythema.  Good appetite.  Denies chest pain, shortness of breath, nausea or vomiting.  Ambulatory.  Breastfeeding well.          Significant Problems:      Past Medical History:   Diagnosis Date     Abnormal Pap smear              Review of Systems:    The patient denies any chest pain, shortness of breath, excessive pain, fever, chills, purulent drainage from the wound, nausea or vomiting.          Medications:     All medications related to the patient's surgery have been reviewed  Current Facility-Administered Medications   Medication     ibuprofen (ADVIL/MOTRIN) tablet 400-800 mg     acetaminophen (TYLENOL) tablet 975 mg     sodium chloride 0.9% (bottle) irrigation     lidocaine 1 % 1 mL     lidocaine (LMX4) kit     sodium chloride (PF) 0.9% PF flush 3 mL     sodium chloride (PF) 0.9% PF flush 3 mL     oxytocin (PITOCIN) 30 units in 500 mL 0.9% NaCl infusion     dextrose 5% in lactated ringers infusion     naloxone (NARCAN) injection 0.1-0.4 mg     senna-docusate (SENOKOT-S;PERICOLACE) 8.6-50 MG per tablet 1-2 tablet     [START ON 2017] bisacodyl (DULCOLAX) Suppository 10 mg     [START ON 2017] sodium phosphate (FLEET ENEMA) 1 enema     ondansetron (ZOFRAN) injection 4 mg     hydrocortisone 2.5 %  cream     diphenhydrAMINE (BENADRYL) capsule 25 mg    Or     diphenhydrAMINE (BENADRYL) injection 25 mg     lanolin ointment     simethicone (MYLICON) chewable tablet 80 mg     lactated ringers BOLUS 1,000 mL     oxytocin (PITOCIN) 30 units in 500 mL 0.9% NaCl infusion     oxytocin (PITOCIN) injection 10 Units     misoprostol (CYTOTEC) tablet 400 mcg     NO Rho (D) immune globulin (RhoGam) needed - mother Rh POSITIVE     [START ON 4/2/2017] acetaminophen (TYLENOL) tablet 650 mg     oxyCODONE (ROXICODONE) IR tablet 5-10 mg     ketorolac (TORADOL) injection 30 mg     HYDROmorphone (PF) (DILAUDID) injection 0.3-0.5 mg     measles, mumps and rubella vaccine (MMR) injection 0.5 mL             Physical Exam:   Vitals were reviewed  All vitals stable  Temp: 98.5  F (36.9  C) Temp src: Oral BP: 112/64 Pulse: 76 Heart Rate: 78 Resp: 18 SpO2: 99 %      Wound clean and dry with minimal or no drainage.  Surrounding skin with minimal erythema.          Data:     All laboratory data related to this surgery reviewed  Hemoglobin   Date Value Ref Range Status   03/31/2017 10.0 (L) 11.7 - 15.7 g/dL Final   03/29/2017 11.2 (L) 11.7 - 15.7 g/dL Final   01/24/2017 10.8 (L) 11.7 - 15.7 g/dL Final     Comment:     Results confirmed by repeat test   Reviewed: OK with previous     12/29/2016 11.1 (L) 11.7 - 15.7 g/dL Final     Comment:     Reviewed: OK with previous   12/11/2016 11.4 (L) 11.7 - 15.7 g/dL Final     No imaging studies have been ordered    Rafal Vences MD

## 2017-04-01 VITALS
RESPIRATION RATE: 20 BRPM | HEART RATE: 85 BPM | DIASTOLIC BLOOD PRESSURE: 62 MMHG | TEMPERATURE: 98.7 F | OXYGEN SATURATION: 99 % | SYSTOLIC BLOOD PRESSURE: 126 MMHG

## 2017-04-01 PROCEDURE — 25000125 ZZHC RX 250: Performed by: FAMILY MEDICINE

## 2017-04-01 PROCEDURE — 25000132 ZZH RX MED GY IP 250 OP 250 PS 637: Performed by: FAMILY MEDICINE

## 2017-04-01 PROCEDURE — 90707 MMR VACCINE SC: CPT | Performed by: FAMILY MEDICINE

## 2017-04-01 PROCEDURE — 25000132 ZZH RX MED GY IP 250 OP 250 PS 637: Performed by: OBSTETRICS & GYNECOLOGY

## 2017-04-01 RX ORDER — IBUPROFEN 400 MG/1
400-800 TABLET, FILM COATED ORAL EVERY 6 HOURS PRN
Qty: 120 TABLET | Refills: 1 | Status: SHIPPED | OUTPATIENT
Start: 2017-04-01 | End: 2017-04-12

## 2017-04-01 RX ORDER — OXYCODONE HYDROCHLORIDE 5 MG/1
5-10 TABLET ORAL
Qty: 30 TABLET | Refills: 0 | Status: SHIPPED | OUTPATIENT
Start: 2017-04-01 | End: 2017-04-12

## 2017-04-01 RX ORDER — AMOXICILLIN 250 MG
1-2 CAPSULE ORAL 2 TIMES DAILY
Qty: 100 TABLET | Refills: 1 | Status: SHIPPED | OUTPATIENT
Start: 2017-04-01 | End: 2017-04-12

## 2017-04-01 RX ADMIN — IBUPROFEN 800 MG: 400 TABLET ORAL at 04:00

## 2017-04-01 RX ADMIN — OXYCODONE HYDROCHLORIDE 10 MG: 5 TABLET ORAL at 05:32

## 2017-04-01 RX ADMIN — OXYCODONE HYDROCHLORIDE 10 MG: 5 TABLET ORAL at 02:10

## 2017-04-01 RX ADMIN — ACETAMINOPHEN 975 MG: 325 TABLET, FILM COATED ORAL at 05:31

## 2017-04-01 RX ADMIN — MEASLES, MUMPS, AND RUBELLA VIRUS VACCINE LIVE 0.5 ML: 1000; 12500; 1000 INJECTION, POWDER, LYOPHILIZED, FOR SUSPENSION SUBCUTANEOUS at 11:01

## 2017-04-01 NOTE — PROGRESS NOTES
Patient is stable and ready for discharge. Patient's pain is being managed with Ibuprofen and tylenol. Incision to low abdomen is with barrier film and island dressing.  Steri strips visible underneath, no drainage or signs of infection noted.  VSS. Bonding well with infant. Bottle feeding infant.  Voiding without difficulty and tolerating regular foods.  Is up independent in room, meeting all personal and infant needs.  All discharge paperwork reviewed with patient by LPN and all questions answered. Writer verified that there were no further questions.  MMR given prior to discharge.  Patient left unit with all belongings and infant at 1115. Home care referral faxed over to [facility].

## 2017-04-01 NOTE — DISCHARGE INSTRUCTIONS
Postop  Birth Instructions  Follow up in clinic in 2 weeks.  [facility]: [phone]  Lactation: [phone]    Activity       Do not lift more than 10 pounds for 6 weeks after surgery.  Ask family and friends for help when you need it.    No driving until you have stopped taking your pain medications (usually two weeks after surgery).    No heavy exercise or activity for 6 weeks.  Don't do anything that will put a strain on your surgery site.    Don't strain when using the toilet.  Your care team may prescribe a stool softener if you have problems with your bowel movements.     To care for your incision:       Keep the incision clean and dry.    Do not soak your incision in water. No swimming or hot tubs until it has fully healed. You may soak in the bathtub if the water level is below your incision.    Do not use peroxide, gel, cream, lotion, or ointment on your incision.    Adjust your clothes to avoid pressure on your surgery site (check the elastic in your underwear for example).     You may see a small amount of clear or pink drainage and this is normal.  Check with your health care provider:       If the drainage increases or has an odor.    If the incision reddens, you have swelling, or develop a rash.    If you have increased pain and the medicine we prescribed doesn't help.    If you have a fever above 100.4 F (38 C) with or without chills when placing thermometer under your tongue.   The area around your incision (surgery wound), will feel numb.  This is normal. The numbness should go away in less than a year.     Keep your hands clean:  Always wash your hands before touching your incision (surgery wound). This helps reduce your risk of infection. If your hands aren't dirty, you may use an alcohol hand-rub to clean your hands. Keep your nails clean and short.    Call your healthcare provider if you have any of these symptoms:       You soak a sanitary pad with blood within 1 hour, or you  see blood clots larger than a golf ball.    Bleeding that lasts more than 6 weeks.    Vaginal discharge that smells bad.    Severe pain, cramping or tenderness in your lower belly area.    A need to urinate more frequently (use the toilet more often), more urgently (use the toilet very quickly), or it burns when you urinate.    Nausea and vomiting.    Redness, swelling or pain around a vein in your leg.    Problems breastfeeding or a red or painful area on your breast.    Chest pain and cough or are gasping for air.    Problems with coping with sadness, anxiety or depression. If you have concerns about hurting yourself or the baby, call your provider immediately.      You have questions or concerns after you return home.

## 2017-04-01 NOTE — DISCHARGE SUMMARY
[facility] Discharge Summary    Malgorzata Ramachandran MRN# [id number]   Age: 22 year old YOB: 1995     Date of Admission:  3/30/2017  Date of Discharge::  2017  Admitting Physician:  Teodora Dodson DO  Discharge Physician:  Parth Loja MD     Home clinic: [facility]          Admission Diagnoses:   Previous  Section   S/P  section          Discharge Diagnosis:   Previous  Section           Procedures:   Procedure(s): Repeat low transverse  section       No other procedures performed during this admission           Medications Prior to Admission:     Prescriptions Prior to Admission   Medication Sig Dispense Refill Last Dose     Prenatal Vit-Fe Fumarate-FA (PRENATAL MULTIVITAMIN  PLUS IRON) 27-0.8 MG TABS per tablet Take 1 tablet by mouth daily 90 tablet 3 3/29/2017 at Unknown time     hydrOXYzine (VISTARIL) 50 MG capsule Take 1-2 capsules ( mg) by mouth every 6 hours as needed for other (rest) 10 capsule 0 Unknown at Unknown time     order for DME Equipment being ordered: maternal support belt (Patient not taking: Reported on 2017) 1 Device 1 Not Taking             Discharge Medications:     Current Discharge Medication List      START taking these medications    Details   ibuprofen (ADVIL/MOTRIN) 400 MG tablet Take 1-2 tablets (400-800 mg) by mouth every 6 hours as needed for other (cramping)  Qty: 120 tablet, Refills: 1    Associated Diagnoses: S/P  section      oxyCODONE (ROXICODONE) 5 MG IR tablet Take 1-2 tablets (5-10 mg) by mouth every 3 hours as needed for moderate to severe pain  Qty: 30 tablet, Refills: 0    Associated Diagnoses: S/P  section      senna-docusate (SENOKOT-S;PERICOLACE) 8.6-50 MG per tablet Take 1-2 tablets by mouth 2 times daily  Qty: 100 tablet, Refills: 1    Associated Diagnoses: S/P  section         CONTINUE these medications which have NOT CHANGED    Details    Prenatal Vit-Fe Fumarate-FA (PRENATAL MULTIVITAMIN  PLUS IRON) 27-0.8 MG TABS per tablet Take 1 tablet by mouth daily  Qty: 90 tablet, Refills: 3    Associated Diagnoses: Previous  delivery, antepartum condition or complication; High-risk pregnancy, young multigravida, third trimester      hydrOXYzine (VISTARIL) 50 MG capsule Take 1-2 capsules ( mg) by mouth every 6 hours as needed for other (rest)  Qty: 10 capsule, Refills: 0    Associated Diagnoses: Encounter for triage in pregnant patient      order for DME Equipment being ordered: maternal support belt  Qty: 1 Device, Refills: 1    Associated Diagnoses: Bilateral low back pain with left-sided sciatica, unspecified chronicity; High-risk pregnancy, young multigravida, third trimester                   Consultations:   No consultations were requested during this admission          Brief History of Labor or Admission:   See note           Hospital Course:   The patient's hospital course was unremarkable.  She recovered as anticipated and experienced no post-operative complications.  On discharge, her pain was well controlled. Vaginal bleeding is similar to peak menstrual flow.  Voiding without difficulty.  Ambulating well and tolerating a normal diet.  No fever or significant wound drainage.  Breastfeeding well.  Infant is stable.  No bowel movement yet.  She was discharged on post-partum day #2.    Post-partum hemoglobin:   Hemoglobin   Date Value Ref Range Status   2017 10.0 (L) 11.7 - 15.7 g/dL Final             Discharge Instructions and Follow-Up:   Discharge diet: Regular   Discharge activity: Lifting restricted to baby and car seat for 6 weeks  No driving or operating machinery while on narcotic analgesics  Pelvic rest: abstain from intercourse and do not use tampons for 6 week(s)   Discharge follow-up: Follow up with primary care provider in 2 weeks   Wound care: Drink plenty of fluids           Discharge Disposition:   Discharged to  home      Attestation:  I have reviewed today's vital signs, notes, medications, labs and imaging.    Parth Loja MD

## 2017-04-12 ENCOUNTER — OFFICE VISIT (OUTPATIENT)
Dept: OBGYN | Facility: CLINIC | Age: 22
End: 2017-04-12
Payer: MEDICAID

## 2017-04-12 VITALS
WEIGHT: 190 LBS | TEMPERATURE: 99 F | SYSTOLIC BLOOD PRESSURE: 116 MMHG | OXYGEN SATURATION: 98 % | DIASTOLIC BLOOD PRESSURE: 64 MMHG | BODY MASS INDEX: 34.75 KG/M2 | HEART RATE: 83 BPM

## 2017-04-12 DIAGNOSIS — Z98.890 POSTOPERATIVE STATE: Primary | ICD-10-CM

## 2017-04-12 PROCEDURE — 99024 POSTOP FOLLOW-UP VISIT: CPT | Performed by: OBSTETRICS & GYNECOLOGY

## 2017-04-12 NOTE — PATIENT INSTRUCTIONS
You can reach your [place] Care Team any time of the day by calling [phone]. This number will put you in touch with the 24 hour nurse line if the clinic is closed.    To contact your OB/GYN Surgery Scheduler please call [phone]. This is a direct number for your care team between 8 a.m. and 4 p.m. Monday through Friday.    [facility] is open for your convenience: [phone]  Monday through Friday 8 a.m. to 8:30 p.m.  Saturday 9 a.m. to 6 p.m.  Sunday Noon to 6 p.m.    They are closed on all major holidays.

## 2017-04-12 NOTE — MR AVS SNAPSHOT
After Visit Summary   4/12/2017    Malgorzata Ramachandran    MRN: [id number]           Patient Information     Date Of Birth          1995        Visit Information        Provider Department      4/12/2017 10:15 AM Rafal Vences MD [facility]        Today's Diagnoses     Postoperative state    -  1      Care Instructions    You can reach your [place] Care Team any time of the day by calling [phone]. This number will put you in touch with the 24 hour nurse line if the clinic is closed.    To contact your OB/GYN Surgery Scheduler please call [phone]. This is a direct number for your care team between 8 a.m. and 4 p.m. Monday through Friday.    [facility] is open for your convenience: [phone]  Monday through Friday 8 a.m. to 8:30 p.m.  Saturday 9 a.m. to 6 p.m.  Sunday Noon to 6 p.m.    They are closed on all major holidays.            Follow-ups after your visit        Follow-up notes from your care team     Return in about 1 month (around 5/12/2017).      Your next 10 appointments already scheduled     May 15, 2017 11:00 AM CDT   [facility]ences [facility]   [facility]    [address]  [facility] [id number] [phone]           Bring a current list of meds and any records pertaining to this visit.  For Physicals, please bring immunization records and any forms needing to be filled out.  Please arrive 10 minutes early to complete paperwork.              Who to contact     If you have questions or need follow up information about today's clinic visit or your schedule please contact [facility] directly at [phone].  Normal or non-critical lab and imaging results will be communicated to you by MyChart, letter or phone within 4 business days after the clinic has received the results. If you do not hear from us within 7 days, please contact the clinic  through [employer] or phone. If you have a critical or abnormal lab result, we will notify you by phone as soon as possible.  Submit refill requests through [employer] or call your pharmacy and they will forward the refill request to us. Please allow 3 business days for your refill to be completed.          Additional Information About Your Visit        [employer] Information     [employer] gives you secure access to your electronic health record. If you see a primary care provider, you can also send messages to your care team and make appointments. If you have questions, please call your primary care clinic.  If you do not have a primary care provider, please call [phone] and they will assist you.        Care EveryWhere ID     This is your Care EveryWhere ID. This could be used by other organizations to access your [place] medical records  [id number]        Your Vitals Were     Pulse Temperature Last Period Pulse Oximetry Breastfeeding? BMI (Body Mass Index)    83 99  F (37.2  C) (Oral) 06/30/2016 (Exact Date) 98% No 34.75 kg/m2       Blood Pressure from Last 3 Encounters:   04/12/17 116/64   04/01/17 126/62   03/22/17 124/70    Weight from Last 3 Encounters:   04/12/17 190 lb (86.2 kg)   03/22/17 208 lb 14.4 oz (94.8 kg)   03/19/17 205 lb (93 kg)              Today, you had the following     No orders found for display       Primary Care Provider    No Pcp Confirmed       No address on file        Thank you!     Thank you for choosing [facility]  for your care. Our goal is always to provide you with excellent care. Hearing back from our patients is one way we can continue to improve our services. Please take a few minutes to complete the written survey that you may receive in the mail after your visit with us. Thank you!             Your Updated Medication List - Protect others around you: Learn how to safely use, store and throw away your medicines at www.disposemymeds.org.      Notice  As of  4/12/2017  2:38 PM    You have not been prescribed any medications.

## 2017-04-12 NOTE — NURSING NOTE
"Chief Complaint   Patient presents with     Follow Up For     inciasion check       Initial /64  Pulse 83  Temp 99  F (37.2  C) (Oral)  Wt 190 lb (86.2 kg)  LMP 2016 (Exact Date)  SpO2 98%  Breastfeeding? No  BMI 34.75 kg/m2 Estimated body mass index is 34.75 kg/(m^2) as calculated from the following:    Height as of 3/22/17: 5' 2\" (1.575 m).    Weight as of this encounter: 190 lb (86.2 kg).  BP completed using cuff size: regular        The following HM Due: NONE      The following patient reported/Care Every where data was sent to:  P ABSTRACT QUALITY INITIATIVES [[id number]    Pt has no concerns    Estrellita Badillo CMA                "

## 2017-04-12 NOTE — PROGRESS NOTES
Malgorzata Ramachandran is a 22 year old female  M 2017 underwent a repeat  section with a birth of a viable female infant presents today for a two-week postop wound check. The patient is doing well.. Denies significant pain or discomfort. The patient's bottle feeding denies any symptoms to suggest mastitis. She has not had intercourse since her surgery. Patient would like to use a Nexplanon    for contraception in the future and would like to have this placed at her 6 week postpartum visit.  Risks, benefits, and alternative modes of therapy discussed at length. Pathophysiology of the disease process reviewed, all of the patients questions answered and informed consent obtained.  She has used this in the past and has worked well for her  Past Medical History:   Diagnosis Date     Abnormal Pap smear      Constitutional: healthy, alert and no distress  Gastrointestinal: Abdomen soft, non-tender. BS normal. No masses, organomegaly  Previous Pfannenstiel incision is healing nicely    (Z98.890) Postoperative state  (primary encounter diagnosis)  Comment: Doing well without concerns. Postop restrictions instructions and symptoms of concern reviewed patient will call  Plan: we'll see her back for her 6 week visit. Patient would like to have a Nexplanon    placed. She used in the past successfully and would like to do so again

## 2017-05-15 ENCOUNTER — PRENATAL OFFICE VISIT (OUTPATIENT)
Dept: OBGYN | Facility: CLINIC | Age: 22
End: 2017-05-15
Payer: MEDICAID

## 2017-05-15 VITALS
SYSTOLIC BLOOD PRESSURE: 106 MMHG | BODY MASS INDEX: 34.83 KG/M2 | WEIGHT: 189.3 LBS | DIASTOLIC BLOOD PRESSURE: 64 MMHG | HEIGHT: 62 IN

## 2017-05-15 LAB — HCG SERPL QL: NEGATIVE

## 2017-05-15 PROCEDURE — 99207 ZZC POST PARTUM EXAM: CPT | Performed by: OBSTETRICS & GYNECOLOGY

## 2017-05-15 PROCEDURE — 84703 CHORIONIC GONADOTROPIN ASSAY: CPT | Performed by: OBSTETRICS & GYNECOLOGY

## 2017-05-15 NOTE — PATIENT INSTRUCTIONS
You can reach your [place] Care Team any time of the day by calling [phone]. This number will put you in touch with the 24 hour nurse line if the clinic is closed.    To contact your OB/GYN Station Coordinator/Surgery Scheduler please call [phone]. This is a direct number for your care team between 8 a.m. and 4 p.m. Monday through Friday.    [facility] is open for your convenience:  Monday through Friday 8 a.m. to 6 p.m.  Closed weekends and all major holidays.

## 2017-05-15 NOTE — MR AVS SNAPSHOT
After Visit Summary   5/15/2017    Malgorzata Ramachandran    MRN: [id number]           Patient Information     Date Of Birth          1995        Visit Information        Provider Department      5/15/2017 11:00 AM Rafal Vences MD [facility]        Today's Diagnoses     Encounter for initial prescription of implantable subdermal contraceptive    -  1      Care Instructions    You can reach your [place] Care Team any time of the day by calling [phone]. This number will put you in touch with the 24 hour nurse line if the clinic is closed.    To contact your OB/GYN Station Coordinator/Surgery Scheduler please call [phone]. This is a direct number for your care team between 8 a.m. and 4 p.m. Monday through Friday.    [facility] is open for your convenience:  Monday through Friday 8 a.m. to 6 p.m.  Closed weekends and all major holidays.          Follow-ups after your visit        Your next 10 appointments already scheduled     May 16, 2017  2:45 PM CDT   IUD INSERTION AND REMOVAL with Rafal Vences MD   [facility]    [address]  [facility] [id number] [phone]              Who to contact     If you have questions or need follow up information about today's clinic visit or your schedule please contact [facility] directly at [phone].  Normal or non-critical lab and imaging results will be communicated to you by MyChart, letter or phone within 4 business days after the clinic has received the results. If you do not hear from us within 7 days, please contact the clinic through MyChart or phone. If you have a critical or abnormal lab result, we will notify you by phone as soon as possible.  Submit refill requests through [employer] or call your pharmacy and they will forward the refill request to us. Please allow 3 business days for your refill to be completed.          Additional  "Information About Your Visit        MyChart Information     [employer] gives you secure access to your electronic health record. If you see a primary care provider, you can also send messages to your care team and make appointments. If you have questions, please call your primary care clinic.  If you do not have a primary care provider, please call [phone] and they will assist you.        Care EveryWhere ID     This is your Care EveryWhere ID. This could be used by other organizations to access your [place] medical records  [id number]        Your Vitals Were     Height Last Period BMI (Body Mass Index)             5' 2\" (1.575 m) 06/30/2016 (Exact Date) 34.62 kg/m2          Blood Pressure from Last 3 Encounters:   05/15/17 106/64   04/12/17 116/64   04/01/17 126/62    Weight from Last 3 Encounters:   05/15/17 189 lb 4.8 oz (85.9 kg)   04/12/17 190 lb (86.2 kg)   03/22/17 208 lb 14.4 oz (94.8 kg)              We Performed the Following     HCG, qualitative        Primary Care Provider    No Pcp Confirmed       No address on file        Thank you!     Thank you for choosing [facility]  for your care. Our goal is always to provide you with excellent care. Hearing back from our patients is one way we can continue to improve our services. Please take a few minutes to complete the written survey that you may receive in the mail after your visit with us. Thank you!             Your Updated Medication List - Protect others around you: Learn how to safely use, store and throw away your medicines at www.disposemymeds.org.      Notice  As of 5/15/2017  5:05 PM    You have not been prescribed any medications.      "

## 2017-05-15 NOTE — PROGRESS NOTES
SUBJECTIVE: Malgorzata is here for a 6-week postpartum checkup.    Delivery date was 3/30/17. She had a repeat c/s of a viable girl, weight 8 pounds 1 oz., with no complications.  Since delivery, she has not been breast feeding.  She has no signs of infection, bleeding or other complications.  She is not pregnant.  We discussed contraceptions and she has chosen Nexplanon.  She  has had intercourse since delivery and complains of No discomfort. Patient screened for postpartum depression and complaints are NEGATIVE. Screening has also been completed for intimate partner violence.  Teodora Seymour CMA    EXAM:  Today's Depression Rating was 0  GENERAL healthy, alert and no distress  EYES EOMI, intact visual fields, PERRL and funduscopic deferred  HENT: Normocephalic. TM's grossly normal, oropharynx without significant findings.  NECK: NEGATIVE  RESP: Clear to auscultation  BREASTS: NEGATIVE  CV: NEGATIVE  GI: aorta normal, bowel sounds normal, liver span normal to percussion, no bruits heard, no masses palpable, no organomegaly, no renal abnormalities palpable, soft, non-tender, spleen non-palpable and symmetric  GYN PELVIC: NEGATIVE, normal external genitalia, normal groin lymphatics, normal urethral meatus, normal vaginal mucosa, normal cervix, normal adnexa, no masses or tenderness, Recto-vaginal exam negative and uterus normal size and shape  PSYCH: NEGATIVE    ASSESSMENT:   Normal postpartum exam after repeat c/s doing well  Pt will abstain from intercourse to r/o pregnancy or await menses and we will then place a Nexplanon  Risks, benefits, and alternative modes of therapy discussed at length. Pathophysiology of the disease process reviewed, all of the patients questions answered and informed consent obtained.  .    PLAN:  Return as needed or at time of next expected pap, pelvic, or breast exam.

## 2017-05-15 NOTE — NURSING NOTE
"Chief Complaint   Patient presents with     Post Partum Exam     desires Nexplanon--pt has had unprotected intercourse since delivery       Initial /64  Ht 5' 2\" (1.575 m)  Wt 189 lb 4.8 oz (85.9 kg)  LMP 06/30/2016 (Exact Date)  BMI 34.62 kg/m2 Estimated body mass index is 34.62 kg/(m^2) as calculated from the following:    Height as of this encounter: 5' 2\" (1.575 m).    Weight as of this encounter: 189 lb 4.8 oz (85.9 kg).  Medication Reconciliation: complete   Teodora Seymour CMA      "

## 2017-05-16 ASSESSMENT — PATIENT HEALTH QUESTIONNAIRE - PHQ9: SUM OF ALL RESPONSES TO PHQ QUESTIONS 1-9: 0

## 2017-05-18 ENCOUNTER — OFFICE VISIT (OUTPATIENT)
Dept: OBGYN | Facility: CLINIC | Age: 22
End: 2017-05-18
Payer: MEDICAID

## 2017-05-18 VITALS
WEIGHT: 190 LBS | BODY MASS INDEX: 34.75 KG/M2 | SYSTOLIC BLOOD PRESSURE: 110 MMHG | OXYGEN SATURATION: 98 % | HEART RATE: 78 BPM | DIASTOLIC BLOOD PRESSURE: 58 MMHG

## 2017-05-18 DIAGNOSIS — Z30.017 ENCOUNTER FOR INITIAL PRESCRIPTION OF IMPLANTABLE SUBDERMAL CONTRACEPTIVE: ICD-10-CM

## 2017-05-18 DIAGNOSIS — Z30.017 NEXPLANON INSERTION: Primary | ICD-10-CM

## 2017-05-18 PROCEDURE — 11981 INSERTION DRUG DLVR IMPLANT: CPT | Performed by: OBSTETRICS & GYNECOLOGY

## 2017-05-18 NOTE — MR AVS SNAPSHOT
After Visit Summary   5/18/2017    Malgorzata Ramachandran    MRN: [id number]           Patient Information     Date Of Birth          1995        Visit Information        Provider Department      5/18/2017 1:45 PM Rafal Vences MD [facility]        Today's Diagnoses     Nexplanon insertion    -  1    Encounter for initial prescription of implantable subdermal contraceptive          Care Instructions    You can reach your [place] Care Team any time of the day by calling [phone]. This number will put you in touch with the 24 hour nurse line if the clinic is closed.    To contact your OB/GYN Surgery Scheduler please call [phone]. This is a direct number for your care team between 8 a.m. and 4 p.m. Monday through Friday.    [facility] is open for your convenience: [phone]  Monday through Friday 8 a.m. to 8:30 p.m.  Saturday 9 a.m. to 6 p.m.  Sunday Noon to 6 p.m.    They are closed on all major holidays.            Follow-ups after your visit        Follow-up notes from your care team     Return in about 1 month (around 6/18/2017).      Who to contact     If you have questions or need follow up information about today's clinic visit or your schedule please contact [facility] directly at [phone].  Normal or non-critical lab and imaging results will be communicated to you by MyChart, letter or phone within 4 business days after the clinic has received the results. If you do not hear from us within 7 days, please contact the clinic through [employer] or phone. If you have a critical or abnormal lab result, we will notify you by phone as soon as possible.  Submit refill requests through [employer] or call your pharmacy and they will forward the refill request to us. Please allow 3 business days for your refill to be completed.          Additional Information About Your Visit        [employer] Information     [employer] gives you secure  access to your electronic health record. If you see a primary care provider, you can also send messages to your care team and make appointments. If you have questions, please call your primary care clinic.  If you do not have a primary care provider, please call [phone] and they will assist you.        Care EveryWhere ID     This is your Care EveryWhere ID. This could be used by other organizations to access your [place] medical records  [id number]        Your Vitals Were     Pulse Last Period Pulse Oximetry BMI (Body Mass Index)          78 05/11/2017 98% 34.75 kg/m2         Blood Pressure from Last 3 Encounters:   05/18/17 110/58   05/15/17 106/64   04/12/17 116/64    Weight from Last 3 Encounters:   05/18/17 190 lb (86.2 kg)   05/15/17 189 lb 4.8 oz (85.9 kg)   04/12/17 190 lb (86.2 kg)              We Performed the Following     ETONOGESTREL IMPLANT SYSTEM     HCL HCG, URINE, NURSE BACKOFFICE     REMOVAL AND REINSERTION NEXPLANON        Primary Care Provider    No Pcp Confirmed       No address on file        Thank you!     Thank you for choosing [facility]  for your care. Our goal is always to provide you with excellent care. Hearing back from our patients is one way we can continue to improve our services. Please take a few minutes to complete the written survey that you may receive in the mail after your visit with us. Thank you!             Your Updated Medication List - Protect others around you: Learn how to safely use, store and throw away your medicines at www.disposemymeds.org.      Notice  As of 5/18/2017 11:59 PM    You have not been prescribed any medications.

## 2017-05-18 NOTE — NURSING NOTE
"Chief Complaint   Patient presents with     Contraception       Initial /58  Pulse 78  Wt 190 lb (86.2 kg)  LMP 2017  SpO2 98%  BMI 34.75 kg/m2 Estimated body mass index is 34.75 kg/(m^2) as calculated from the following:    Height as of 5/15/17: 5' 2\" (1.575 m).    Weight as of this encounter: 190 lb (86.2 kg).  BP completed using cuff size: regular        The following HM Due:       The following patient reported/Care Every where data was sent to:  P ABSTRACT QUALITY INITIATIVES [[id number]]       Estrellita Badillo, [facility]                "

## 2017-05-18 NOTE — PATIENT INSTRUCTIONS
You can reach your [place] Care Team any time of the day by calling [phone]. This number will put you in touch with the 24 hour nurse line if the clinic is closed.    To contact your OB/GYN Surgery Scheduler please call [phone]. This is a direct number for your care team between 8 a.m. and 4 p.m. Monday through Friday.    [facility] is open for your convenience: [phone]  Monday through Friday 8 a.m. to 8:30 p.m.  Saturday 9 a.m. to 6 p.m.  Sunday Noon to 6 p.m.    They are closed on all major holidays.

## 2017-05-18 NOTE — PROGRESS NOTES
CC: Desires Nexplanon insertion    HPI: Malgorzata Ramachandran is a 22 year old  Patient's last menstrual period was 2017.  who is here for nexplanon insertion.  She has not had intercourse since her LMP and  is currently using nothing for contraception and is interested in long-acting reversible contraception. We have discussed options including nexplanon, depo provera, and hormonal and copper IUDs, and she has chosen Nexplanon.  She is otherwise without complaints.  Pregnancy test was done 5/15/2017 and result was negative. Patient denies having intercourse since negative pregnancy test.    ROS:  C: NEGATIVE for fever, chills, change in weight  I: NEGATIVE for worrisome rashes, moles or lesions  E: NEGATIVE for vision changes or irritation  E/M: NEGATIVE for ear, mouth and throat problems  R: NEGATIVE for significant cough or SOB  CV: NEGATIVE for chest pain, palpitations or peripheral edema  GI: NEGATIVE for nausea, abdominal pain, heartburn, or change in bowel habits  : NEGATIVE for frequency, dysuria, hematuria, vaginal discharge  M: NEGATIVE for significant arthralgias or myalgia  N: NEGATIVE for weakness, dizziness or paresthesias  E: NEGATIVE for temperature intolerance, skin/hair changes  P: NEGATIVE for changes in mood or affect    This document serves as a record of the services and decisions personally performed and made by Rafal Vences M.D. It was created on his behalf by Rosio Hale , a trained medical scribe. The creation of this document is based the provider's statements to the medical scribe.  Rosio Hale  May 18, 2017 2:08 PM      Past Medical History:   Diagnosis Date     Abnormal Pap smear        Past Surgical History:   Procedure Laterality Date     ANESTH,LOWER ARM SKIN SURG       BIOPSY OF SKIN LESION        SECTION  2014      SECTION N/A 3/30/2017    Procedure:  SECTION;  Surgeon: Teodora Dodson DO;  Location:  L+D         [facility]  History   Problem Relation Age of Onset     Family History Negative Mother         No Known Allergies    No current outpatient prescriptions on file.       Social History     Social History     Marital status:      Spouse name: N/A     Number of children: N/A     Years of education: N/A     Occupational History     Not on file.     Social History Main Topics     Smoking status: Never Smoker     Smokeless tobacco: Not on file     Alcohol use No     Drug use: No     Sexual activity: Not on file     Other Topics Concern     Not on file     Social History Narrative       /58  Pulse 78  Wt 86.2 kg (190 lb)  LMP 05/11/2017  SpO2 98%  BMI 34.75 kg/m2    Gen: Healthy appearing female in NAD    Procedure:  After informed consent was obtained, the patient was positioned on the exam table with the left arm extended and elbow bent at 90 degree angle.  The biceps grove was identified and insertion site identified 8-10cm from the medial epicondyle of the humerus. The area was prepped with alcohol, and 3cc of 1% lidocaine was injected along the insertion tract.  The insertion site was then swabbed with betadine x 3. Next the nexplanon was inserted without difficulty in the recommended fashion.  The device was removed and the implant palpably in the correct position.  A small amount of bleeding was noted at the insertion site.  A gauze and pressure wrap was applied to the insertion site.  She tolerated the procedure well.      A/P  1) Contraception, successful nexplanon insertion   We discussed signs/symptoms of infection and when to call.  We again reviewed potential side effects including irregular bleeding.  She is to call with any questions.  Otherwise, RTC in 1-2 weeks and if ok annually  Pt understands device is good for 3 yrs  .  Written plan given

## 2017-08-15 NOTE — DISCHARGE INSTRUCTIONS
----- Message from Ade Monae sent at 8/15/2017 11:39 AM CDT -----  Contact: Self   Patient says she missed a call from the office . Please call again at [phone]   Discharge Instruction for Undelivered Patients      You were seen for: Labor Assessment and Fetal Assessment  We Consulted: DR Ochoa  You had (Test or Medicine):Fetal monitoring, urine testing     Diet:   Drink 8 to 12 glasses of liquids (milk, juice, water) every day.     Activity:  Count fetal kicks everyday (see handout)  Call your doctor or nurse midwife if your baby is moving less than usual.     Call your provider if you notice:  Swelling in your face or increased swelling in your hands or legs.  Headaches that are not relieved by Tylenol (acetaminophen).  Changes in your vision (blurring: seeing spots or stars.)  Nausea (sick to your stomach) and vomiting (throwing up).   Weight gain of 5 pounds or more per week.  Heartburn that doesn't go away.  Signs of bladder infection: pain when you urinate (use the toilet), need to go more often and more urgently.  The bag of hollis (rupture of membranes) breaks, or you notice leaking in your underwear.  Bright red blood in your underwear.  Abdominal (lower belly) or stomach pain.  Second (plus) baby: Contractions (tightening) less than 10 minutes apart and getting stronger.  *If less than 34 weeks: Contractions (tightenings) more than 6 times in one hour.  Increase or change in vaginal discharge (note the color and amount)    Follow-up:  As scheduled in the clinic

## 2018-01-07 ENCOUNTER — HOSPITAL ENCOUNTER (EMERGENCY)
Facility: CLINIC | Age: 23
Discharge: HOME | End: 2018-01-08
Attending: EMERGENCY MEDICINE | Admitting: EMERGENCY MEDICINE
Payer: COMMERCIAL

## 2018-01-07 DIAGNOSIS — R03.0 ELEVATED BLOOD PRESSURE READING WITHOUT DIAGNOSIS OF HYPERTENSION: ICD-10-CM

## 2018-01-07 DIAGNOSIS — K80.50 BILIARY COLIC: ICD-10-CM

## 2018-01-07 DIAGNOSIS — R10.13 ABDOMINAL PAIN, EPIGASTRIC: ICD-10-CM

## 2018-01-07 DIAGNOSIS — R11.2 NAUSEA AND VOMITING, INTRACTABILITY OF VOMITING NOT SPECIFIED, UNSPECIFIED VOMITING TYPE: ICD-10-CM

## 2018-01-07 PROCEDURE — 96375 TX/PRO/DX INJ NEW DRUG ADDON: CPT

## 2018-01-07 PROCEDURE — 96361 HYDRATE IV INFUSION ADD-ON: CPT

## 2018-01-07 PROCEDURE — 25000125 ZZHC RX 250: Performed by: EMERGENCY MEDICINE

## 2018-01-07 PROCEDURE — 99285 EMERGENCY DEPT VISIT HI MDM: CPT | Mod: 25

## 2018-01-07 PROCEDURE — 96374 THER/PROPH/DIAG INJ IV PUSH: CPT

## 2018-01-07 RX ORDER — KETOROLAC TROMETHAMINE 30 MG/ML
30 INJECTION, SOLUTION INTRAMUSCULAR; INTRAVENOUS ONCE
Status: COMPLETED | OUTPATIENT
Start: 2018-01-07 | End: 2018-01-08

## 2018-01-07 RX ORDER — ONDANSETRON 4 MG/1
4 TABLET, ORALLY DISINTEGRATING ORAL ONCE
Status: COMPLETED | OUTPATIENT
Start: 2018-01-07 | End: 2018-01-07

## 2018-01-07 RX ADMIN — ONDANSETRON 4 MG: 4 TABLET, ORALLY DISINTEGRATING ORAL at 21:58

## 2018-01-07 NOTE — ED AVS SNAPSHOT
[facility] Emergency Department    [address]    [facility] [address]    Phone:  [phone]    Fax:  [phone]                                       Malgorzata Ramachandran   MRN: [id number]    Department:  [facility] Emergency Department   Date of Visit:  1/7/2018           Patient Information     Date Of Birth          1995        Your diagnoses for this visit were:     Abdominal pain, epigastric     Nausea and vomiting, intractability of vomiting not specified, unspecified vomiting type     Biliary colic     Elevated blood pressure reading without diagnosis of hypertension        You were seen by Jerardo Blount MD.      Follow-up Information     Follow up with Your doctor. Schedule an appointment as soon as possible for a visit in 2 days.        Discharge Instructions       Discharge Instructions  Abdominal Pain    Abdominal pain can be caused by many things. Your evaluation today does not show the exact cause for your pain. Your doctor today has decided that it is unlikely your pain is due to a life threatening problem, or a problem requiring surgery or hospital admission. Sometimes those problems cannot be found right away, so it is very important that you follow up as directed.  Sometimes only the changes which occur over time allow the cause of your pain to be found.    Return to the Emergency Department for a recheck in 8-12 hours if your pain continues.  If your pain gets worse, changes in location, or feels different, return to the Emergency Department right away.    ADULTS:  Return to the Emergency Department right away if:      You get an oral temperature above 102oF or as directed by your doctor.    You have blood in your stools (bright red or black, tarry stools).    You keep throwing up or can t drink liquids.    You see blood when you throw up.    You can t have a bowel movement or you can t pass gas.    Your stomach gets bloated or  bigger.    Your skin or the whites of your eyes look yellow.    You faint.    You have bloody, frequent or painful urination.    You have new symptoms or anything that worries you.    CHILDREN:  Return to the Emergency Department right away if your child has any of the above-listed symptoms or the following:      Pushes your hand away or screams/cries when his/her belly is touched.    You notice your child is very fussy or weak.    Your child is very tired and is too tired to eat or drink.    Your child is dehydrated.  Signs of dehydration can be:  o Your infant has had no wet diapers in 4-5 hours.  o Your older child has not passed urine in 6-8 hours.  o Your infant or child starts to have dry mouth and lips, or no saliva or tears.    PREGNANT WOMEN:  Return to the Emergency Department right away if you have any of the above-listed symptoms or the following:      You have bleeding, leaking fluid or passing tissue from the vagina.    You have worse pain or cramping, or pain in your shoulder or back.    You have vomiting that will not stop.    You have painful or bloody urination.    You have a temperature of 100oF or more.    Your baby is not moving as much as usual.    You faint.    You get a bad headache with or without eye problems and abdominal pain.    You have a convulsion or seizure.    You have unusual discharge from your vagina and abdominal pain.    Abdominal pain is pretty common during pregnancy.  Your pain may or may not be related to your pregnancy. You should follow-up closely with your OB doctor so they can evaluate you and your baby.  Until you follow-up with your regular doctor, do the following:       Avoid sex and do not put anything in your vagina.    Drink clear fluids.    Only take medications approved by your doctor.    MORE INFORMATION:    Appendicitis:  A possible cause of abdominal pain in any person who still has their appendix is acute appendicitis. Appendicitis is often hard to  "diagnose.  Testing does not always rule out early appendicitis or other causes of abdominal pain. Close follow-up with your doctor and re-evaluations may be needed to figure out the reason for your abdominal pain.    Follow-up:  It is very important that you make an appointment with your clinic and go to the appointment.  If you do not follow-up with your primary doctor, it may result in missing an important development which could result in permanent injury or disability and/or lasting pain.  If there is any problem keeping your appointment, call your doctor or return to the Emergency Department.    Medications:  Take your medications as directed by your doctor today.  Before using over-the-counter medications, ask your doctor and make sure to take the medications as directed.  If you have any questions about medications, ask your doctor.    Diet:  Resume your normal diet as much as possible, but do not eat fried, fatty or spicy foods while you have pain.  Do not drink alcohol or have caffeine.  Do not smoke tobacco.    Probiotics: If you have been given an antibiotic, you may want to also take a probiotic pill or eat yogurt with live cultures. Probiotics have \"good bacteria\" to help your intestines stay healthy. Studies have shown that probiotics help prevent diarrhea and other intestine problems (including C. diff infection) when you take antibiotics. You can buy these without a prescription in the pharmacy section of the store.     If you were given a prescription for medicine here today, be sure to read all of the information (including the package insert) that comes with your prescription.  This will include important information about the medicine, its side effects, and any warnings that you need to know about.  The pharmacist who fills the prescription can provide more information and answer questions you may have about the medicine.  If you have questions or concerns that the pharmacist cannot address, " please call or return to the Emergency Department.         Opioid Medication Information    Pain medications are among the most commonly prescribed medicines, so we are including this information for all our patients. If you did not receive pain medication or get a prescription for pain medicine, you can ignore it.     You may have been given a prescription for an opioid (narcotic) pain medicine and/or have received a pain medicine while here in the Emergency Department. These medicines can make you drowsy or impaired. You must not drive, operate dangerous equipment, or engage in any other dangerous activities while taking these medications. If you drive while taking these medications, you could be arrested for DUI, or driving under the influence. Do not drink any alcohol while you are taking these medications.     Opioid pain medications can cause addiction. If you have a history of chemical dependency of any type, you are at a higher risk of becoming addicted to pain medications.  Only take these prescribed medications to treat your pain when all other options have been tried. Take it for as short a time and as few doses as possible. Store your pain pills in a secure place, as they are frequently stolen and provide a dangerous opportunity for children or visitors in your house to start abusing these powerful medications. We will not replace any lost or stolen medicine.  As soon as your pain is better, you should flush all your remaining medication.     Many prescription pain medications contain Tylenol  (acetaminophen), including Vicodin , Tylenol #3 , Norco , Lortab , and Percocet .  You should not take any extra pills of Tylenol  if you are using these prescription medications or you can get very sick.  Do not ever take more than 3000 mg of acetaminophen in any 24 hour period.    All opioids tend to cause constipation. Drink plenty of water and eat foods that have a lot of fiber, such as fruits, vegetables,  prune juice, apple juice and high fiber cereal.  Take a laxative if you don t move your bowels at least every other day. Miralax , Milk of Magnesia, Colace , or Senna  can be used to keep you regular.      Remember that you can always come back to the Emergency Department if you are not able to see your regular doctor in the amount of time listed above, if you get any new symptoms, or if there is anything that worries you.          24 Hour Appointment Hotline       To make an appointment at any [facility], call [phone] ([phone]). If you don't have a family doctor or clinic, we will help you find one. [facility] are conveniently located to serve the needs of you and your family.             Review of your medicines      START taking        Dose / Directions Last dose taken    ondansetron 4 MG ODT tab   Commonly known as:  ZOFRAN ODT   Dose:  4 mg   Quantity:  10 tablet        Take 1 tablet (4 mg) by mouth every 8 hours as needed for nausea   Refills:  0                Prescriptions were sent or printed at these locations (1 Prescription)                   Other Prescriptions                Printed at Department/Unit printer (1 of 1)         ondansetron (ZOFRAN ODT) 4 MG ODT tab                Procedures and tests performed during your visit     CBC with platelets differential    Comprehensive metabolic panel    HCG qualitative urine    Lipase    Peripheral IV: Standard    UA with Microscopic    US Abdomen Limited      Orders Needing Specimen Collection     None      Pending Results     No orders found for last 3 day(s).            Pending Culture Results     No orders found for last 3 day(s).            Pending Results Instructions     If you had any lab results that were not finalized at the time of your Discharge, you can call the ED Lab Result RN at [phone]. You will be contacted by this team for any positive Lab results or changes in treatment. The nurses are available 7 days a week  from 10A to 6:30P.  You can leave a message 24 hours per day and they will return your call.        Test Results From Your Hospital Stay        1/8/2018  1:50 AM      Component Results     Component Value Ref Range & Units Status    Color Urine Yellow  Final    Appearance Urine Cloudy  Final    Glucose Urine Negative NEG^Negative mg/dL Final    Bilirubin Urine Negative NEG^Negative Final    Ketones Urine Negative NEG^Negative mg/dL Final    Specific Gravity Urine 1.028 1.003 - 1.035 Final    Blood Urine Negative NEG^Negative Final    pH Urine 7.0 5.0 - 7.0 pH Final    Protein Albumin Urine 30 (A) NEG^Negative mg/dL Final    Urobilinogen mg/dL 4.0 (H) 0.0 - 2.0 mg/dL Final    Nitrite Urine Negative NEG^Negative Final    Leukocyte Esterase Urine Negative NEG^Negative Final    Source Midstream Urine  Final    WBC Urine 1 0 - 2 /HPF Final    RBC Urine 0 0 - 2 /HPF Final    Bacteria Urine Many (A) NEG^Negative /HPF Final    Squamous Epithelial /HPF Urine 3 (H) 0 - 1 /HPF Final    Mucous Urine Present (A) NEG^Negative /LPF Final    Amorphous Crystals Few (A) NEG^Negative /HPF Final         1/8/2018  1:15 AM      Component Results     Component Value Ref Range & Units Status    WBC 9.1 4.0 - 11.0 10e9/L Final    RBC Count 4.30 3.8 - 5.2 10e12/L Final    Hemoglobin 12.8 11.7 - 15.7 g/dL Final    Hematocrit 37.8 35.0 - 47.0 % Final    MCV 88 78 - 100 fl Final    MCH 29.8 26.5 - 33.0 pg Final    MCHC 33.9 31.5 - 36.5 g/dL Final    RDW 12.6 10.0 - 15.0 % Final    Platelet Count 267 150 - 450 10e9/L Final    Diff Method Automated Method  Final    % Neutrophils 68.9 % Final    % Lymphocytes 23.0 % Final    % Monocytes 6.2 % Final    % Eosinophils 1.3 % Final    % Basophils 0.4 % Final    % Immature Granulocytes 0.2 % Final    Nucleated RBCs 0 0 /100 Final    Absolute Neutrophil 6.3 1.6 - 8.3 10e9/L Final    Absolute Lymphocytes 2.1 0.8 - 5.3 10e9/L Final    Absolute Monocytes 0.6 0.0 - 1.3 10e9/L Final    Absolute Eosinophils  0.1 0.0 - 0.7 10e9/L Final    Absolute Basophils 0.0 0.0 - 0.2 10e9/L Final    Abs Immature Granulocytes 0.0 0 - 0.4 10e9/L Final    Absolute Nucleated RBC 0.0  Final         1/8/2018  1:33 AM      Component Results     Component Value Ref Range & Units Status    Sodium 139 133 - 144 mmol/L Final    Potassium 3.8 3.4 - 5.3 mmol/L Final    Chloride 107 94 - 109 mmol/L Final    Carbon Dioxide 26 20 - 32 mmol/L Final    Anion Gap 6 3 - 14 mmol/L Final    Glucose 95 70 - 99 mg/dL Final    Urea Nitrogen 13 7 - 30 mg/dL Final    Creatinine 0.59 0.52 - 1.04 mg/dL Final    GFR Estimate >90 >60 mL/min/1.7m2 Final    Non  GFR Calc    GFR Estimate If Black >90 >60 mL/min/1.7m2 Final    African American GFR Calc    Calcium 8.9 8.5 - 10.1 mg/dL Final    Bilirubin Total 0.2 0.2 - 1.3 mg/dL Final    Albumin 3.6 3.4 - 5.0 g/dL Final    Protein Total 8.0 6.8 - 8.8 g/dL Final    Alkaline Phosphatase 96 40 - 150 U/L Final    ALT 18 0 - 50 U/L Final    AST 11 0 - 45 U/L Final         1/8/2018  1:33 AM      Component Results     Component Value Ref Range & Units Status    Lipase 108 73 - 393 U/L Final         1/8/2018  2:35 AM      Narrative     ULTRASOUND ABDOMEN LIMITED RIGHT UPPER QUADRANT   1/8/2018 2:25 AM     HISTORY: Right upper quadrant pain.    COMPARISON: None.    FINDINGS: Normal hepatic echogenicity. No hepatic masses. A 0.4 cm  nonmobile echogenic focus along the inner wall of the gallbladder  fundus likely represents a polyp. The gallbladder is otherwise  unremarkable without gallstones, wall thickening or pericholecystic  fluid. No focal tenderness over the gallbladder. No intra or  extrahepatic biliary dilatation. The common duct measures 0.3 cm in  diameter. The right kidney has normal size and echogenicity, measuring  10.1 cm in length. No right intrarenal collecting system dilatation,  calculi or masses. No free fluid in the upper right hemiabdomen.        Impression     IMPRESSION:   1. 0.4 cm  gallbladder polyp, likely a cholesterol polyp.  2. Otherwise, unremarkable ultrasound of the upper right hemiabdomen.    EDA VELA MD         1/8/2018  1:50 AM      Component Results     Component Value Ref Range & Units Status    HCG Qual Urine Negative NEG^Negative Final    This test is for screening purposes.  Results should be interpreted along with   the clinical picture.  Confirmation testing is available if warranted by   ordering KCP038, HCG Quantitative Pregnancy.                  Clinical Quality Measure: Blood Pressure Screening     Your blood pressure was checked while you were in the emergency department today. The last reading we obtained was  BP: (!) 150/94 . Please read the guidelines below about what these numbers mean and what you should do about them.  If your systolic blood pressure (the top number) is less than 120 and your diastolic blood pressure (the bottom number) is less than 80, then your blood pressure is normal. There is nothing more that you need to do about it.  If your systolic blood pressure (the top number) is 120-139 or your diastolic blood pressure (the bottom number) is 80-89, your blood pressure may be higher than it should be. You should have your blood pressure rechecked within a year by a primary care provider.  If your systolic blood pressure (the top number) is 140 or greater or your diastolic blood pressure (the bottom number) is 90 or greater, you may have high blood pressure. High blood pressure is treatable, but if left untreated over time it can put you at risk for heart attack, stroke, or kidney failure. You should have your blood pressure rechecked by a primary care provider within the next 4 weeks.  If your provider in the emergency department today gave you specific instructions to follow-up with your doctor or provider even sooner than that, you should follow that instruction and not wait for up to 4 weeks for your follow-up visit.        Thank you for  choosing [facility]       Thank you for choosing [facility] for your care. Our goal is always to provide you with excellent care. Hearing back from our patients is one way we can continue to improve our services. Please take a few minutes to complete the written survey that you may receive in the mail after you visit with us. Thank you!        [facility] Information     [facility] gives you secure access to your electronic health record. If you see a primary care provider, you can also send messages to your care team and make appointments. If you have questions, please call your primary care clinic.  If you do not have a primary care provider, please call [phone] and they will assist you.        Care EveryWhere ID     This is your Care EveryWhere ID. This could be used by other organizations to access your [facility] medical records  [id number]        Equal Access to Services     BRITTA YOST : Heladio Weeks, allison soto, aniyah olivas, gordon naranjo. So [facility] [phone].    ATENCIÓN: Si habla español, tiene a luna disposición servicios gratuitos de asistencia lingüística. Llame al [phone].    We comply with applicable federal civil rights laws and [place] laws. We do not discriminate on the basis of race, color, national origin, age, disability, sex, sexual orientation, or gender identity.            After Visit Summary       This is your record. Keep this with you and show to your community pharmacist(s) and doctor(s) at your next visit.

## 2018-01-07 NOTE — ED AVS SNAPSHOT
[facility] Emergency Department    [address]    [facility] [address]    Phone:  [phone]    Fax:  [phone]                                       Malgorzata Ramachandran   MRN: [id number]    Department:  [facility] Emergency Department   Date of Visit:  1/7/2018           After Visit Summary Signature Page     I have received my discharge instructions, and my questions have been answered. I have discussed any challenges I see with this plan with the nurse or doctor.    ..........................................................................................................................................  Patient/Patient Representative Signature      ..........................................................................................................................................  Patient Representative Print Name and Relationship to Patient    ..................................................               ................................................  Date                                            Time    ..........................................................................................................................................  Reviewed by Signature/Title    ...................................................              ..............................................  Date                                                            Time

## 2018-01-08 ENCOUNTER — APPOINTMENT (OUTPATIENT)
Dept: ULTRASOUND IMAGING | Facility: CLINIC | Age: 23
End: 2018-01-08
Attending: EMERGENCY MEDICINE
Payer: COMMERCIAL

## 2018-01-08 VITALS
DIASTOLIC BLOOD PRESSURE: 60 MMHG | BODY MASS INDEX: 36.01 KG/M2 | WEIGHT: 196.87 LBS | HEART RATE: 83 BPM | OXYGEN SATURATION: 99 % | SYSTOLIC BLOOD PRESSURE: 109 MMHG | TEMPERATURE: 99.1 F | RESPIRATION RATE: 18 BRPM

## 2018-01-08 LAB
ALBUMIN SERPL-MCNC: 3.6 G/DL (ref 3.4–5)
ALBUMIN UR-MCNC: 30 MG/DL
ALP SERPL-CCNC: 96 U/L (ref 40–150)
ALT SERPL W P-5'-P-CCNC: 18 U/L (ref 0–50)
AMORPH CRY #/AREA URNS HPF: ABNORMAL /HPF
ANION GAP SERPL CALCULATED.3IONS-SCNC: 6 MMOL/L (ref 3–14)
APPEARANCE UR: ABNORMAL
AST SERPL W P-5'-P-CCNC: 11 U/L (ref 0–45)
BACTERIA #/AREA URNS HPF: ABNORMAL /HPF
BASOPHILS # BLD AUTO: 0 10E9/L (ref 0–0.2)
BASOPHILS NFR BLD AUTO: 0.4 %
BILIRUB SERPL-MCNC: 0.2 MG/DL (ref 0.2–1.3)
BILIRUB UR QL STRIP: NEGATIVE
BUN SERPL-MCNC: 13 MG/DL (ref 7–30)
CALCIUM SERPL-MCNC: 8.9 MG/DL (ref 8.5–10.1)
CHLORIDE SERPL-SCNC: 107 MMOL/L (ref 94–109)
CO2 SERPL-SCNC: 26 MMOL/L (ref 20–32)
COLOR UR AUTO: YELLOW
CREAT SERPL-MCNC: 0.59 MG/DL (ref 0.52–1.04)
DIFFERENTIAL METHOD BLD: NORMAL
EOSINOPHIL # BLD AUTO: 0.1 10E9/L (ref 0–0.7)
EOSINOPHIL NFR BLD AUTO: 1.3 %
ERYTHROCYTE [DISTWIDTH] IN BLOOD BY AUTOMATED COUNT: 12.6 % (ref 10–15)
GFR SERPL CREATININE-BSD FRML MDRD: >90 ML/MIN/1.7M2
GLUCOSE SERPL-MCNC: 95 MG/DL (ref 70–99)
GLUCOSE UR STRIP-MCNC: NEGATIVE MG/DL
HCG UR QL: NEGATIVE
HCT VFR BLD AUTO: 37.8 % (ref 35–47)
HGB BLD-MCNC: 12.8 G/DL (ref 11.7–15.7)
HGB UR QL STRIP: NEGATIVE
IMM GRANULOCYTES # BLD: 0 10E9/L (ref 0–0.4)
IMM GRANULOCYTES NFR BLD: 0.2 %
KETONES UR STRIP-MCNC: NEGATIVE MG/DL
LEUKOCYTE ESTERASE UR QL STRIP: NEGATIVE
LIPASE SERPL-CCNC: 108 U/L (ref 73–393)
LYMPHOCYTES # BLD AUTO: 2.1 10E9/L (ref 0.8–5.3)
LYMPHOCYTES NFR BLD AUTO: 23 %
MCH RBC QN AUTO: 29.8 PG (ref 26.5–33)
MCHC RBC AUTO-ENTMCNC: 33.9 G/DL (ref 31.5–36.5)
MCV RBC AUTO: 88 FL (ref 78–100)
MONOCYTES # BLD AUTO: 0.6 10E9/L (ref 0–1.3)
MONOCYTES NFR BLD AUTO: 6.2 %
MUCOUS THREADS #/AREA URNS LPF: PRESENT /LPF
NEUTROPHILS # BLD AUTO: 6.3 10E9/L (ref 1.6–8.3)
NEUTROPHILS NFR BLD AUTO: 68.9 %
NITRATE UR QL: NEGATIVE
NRBC # BLD AUTO: 0 10*3/UL
NRBC BLD AUTO-RTO: 0 /100
PH UR STRIP: 7 PH (ref 5–7)
PLATELET # BLD AUTO: 267 10E9/L (ref 150–450)
POTASSIUM SERPL-SCNC: 3.8 MMOL/L (ref 3.4–5.3)
PROT SERPL-MCNC: 8 G/DL (ref 6.8–8.8)
RBC # BLD AUTO: 4.3 10E12/L (ref 3.8–5.2)
RBC #/AREA URNS AUTO: 0 /HPF (ref 0–2)
SODIUM SERPL-SCNC: 139 MMOL/L (ref 133–144)
SOURCE: ABNORMAL
SP GR UR STRIP: 1.03 (ref 1–1.03)
SQUAMOUS #/AREA URNS AUTO: 3 /HPF (ref 0–1)
UROBILINOGEN UR STRIP-MCNC: 4 MG/DL (ref 0–2)
WBC # BLD AUTO: 9.1 10E9/L (ref 4–11)
WBC #/AREA URNS AUTO: 1 /HPF (ref 0–2)

## 2018-01-08 PROCEDURE — 80053 COMPREHEN METABOLIC PANEL: CPT | Performed by: EMERGENCY MEDICINE

## 2018-01-08 PROCEDURE — 76705 ECHO EXAM OF ABDOMEN: CPT

## 2018-01-08 PROCEDURE — 85025 COMPLETE CBC W/AUTO DIFF WBC: CPT | Performed by: EMERGENCY MEDICINE

## 2018-01-08 PROCEDURE — 81001 URINALYSIS AUTO W/SCOPE: CPT | Performed by: EMERGENCY MEDICINE

## 2018-01-08 PROCEDURE — 81025 URINE PREGNANCY TEST: CPT | Performed by: EMERGENCY MEDICINE

## 2018-01-08 PROCEDURE — 25000128 H RX IP 250 OP 636: Performed by: EMERGENCY MEDICINE

## 2018-01-08 PROCEDURE — 83690 ASSAY OF LIPASE: CPT | Performed by: EMERGENCY MEDICINE

## 2018-01-08 RX ORDER — ONDANSETRON 4 MG/1
4 TABLET, ORALLY DISINTEGRATING ORAL EVERY 8 HOURS PRN
Qty: 10 TABLET | Refills: 0 | Status: SHIPPED | OUTPATIENT
Start: 2018-01-08 | End: 2018-01-11

## 2018-01-08 RX ADMIN — KETOROLAC TROMETHAMINE 30 MG: 30 INJECTION, SOLUTION INTRAMUSCULAR at 01:31

## 2018-01-08 RX ADMIN — SODIUM CHLORIDE 1000 ML: 9 INJECTION, SOLUTION INTRAVENOUS at 01:31

## 2018-01-08 ASSESSMENT — ENCOUNTER SYMPTOMS
HEMATURIA: 0
ABDOMINAL PAIN: 1
FREQUENCY: 0
DYSURIA: 0
DIARRHEA: 0
CONSTIPATION: 0
VOMITING: 1
NAUSEA: 1

## 2018-01-08 NOTE — ED NOTES
23-year-old female presents to the ER with complaints of vomiting on and off since Friday. Pt denies pregnancy and states she is on birth control.

## 2018-01-08 NOTE — ED PROVIDER NOTES
History     Chief Complaint:  Nausea & Vomiting    HPI   Malgorzata Ramachandran is a 23-year-old female who presents with epigastric abdominal pain and vomiting since Friday.  The epigastric abdominal pain is cramping in nature, has been there previously but not to this degree.  She states that when she eats something fatty or spicy symptoms get worse.  It is currently 6/10.  She has had normal bowel and bladder function.  She denies any trauma.  She denies any ill contacts.  She has had some elevated temperatures at home, but otherwise is doing well.    Allergies:  No known drug allergies.      Medications:    Nexplanon     Past Medical History:    History reviewed.  No significant past medical history.      Past Surgical History:    C section   Biopsy of skin lesion     Family History:    History reviewed. No pertinent family history.     Social History:  Marital Status:      Tobacco Use: Never  Alcohol Use: No      Review of Systems   Gastrointestinal: Positive for abdominal pain (epigastric), nausea and vomiting. Negative for constipation and diarrhea.   Genitourinary: Negative for dysuria, frequency, hematuria and urgency.   All other systems reviewed and are negative.    Physical Exam   Patient Vitals for the past 24 hrs:   BP Temp Temp src Pulse Resp SpO2 Weight   01/08/18 0315 109/60 - - 83 18 99 % -   01/07/18 2156 (!) 150/94 99.1  F (37.3  C) Oral 95 18 99 % 89.3 kg (196 lb 13.9 oz)     Physical Exam  General: The patient is alert, in no respiratory distress.    HENT: Mucous membranes moist.    Cardiovascular: Regular rate and rhythm. Good pulses in all four extremities. Normal capillary refill and skin turgor.     Respiratory: Lungs are clear. No nasal flaring. No retractions. No wheezing, no crackles.    Gastrointestinal: Abdomen soft. Epigastric tenderness.  No current vomiting. No guarding, no rebound. No palpable hernias.     Musculoskeletal: No gross deformity.     Skin: No rashes or  petechiae.     Neurologic: The patient is alert and oriented x3. GCS 15. No testable cranial nerve deficit. Follows commands with clear and appropriate speech. Gives appropriate answers. Good strength in all extremities. No gross neurologic deficit. Gross sensation intact. Pupils are round and reactive. No meningismus.     Lymphatic: No cervical adenopathy. No lower extremity swelling.    Psychiatric: The patient is non-tearful.     Emergency Department Course   Imaging:  US Abdomen Limited  1. 0.4 cm gallbladder polyp, likely a cholesterol polyp.  2. Otherwise, unremarkable ultrasound of the upper right hemiabdomen.  Report per radiology: Angel Hernandez MD (01/08/18 02:34:07)    Radiographic findings were communicated with the patient who voiced understanding of the findings.    Laboratory:  Blood:  CBC:   WBC 9.1, HGB 12.8, , otherwise WNL   CMP:  WNL (Creatinine 0.59)   Lipase: 108    Urine:  UA: Cloudy yellow urine, many bacteria, squamous epithelials 3, mucus present, few amorphous crystals, otherwise WNL   HCG Qualitative Pregnancy: Negative.     Interventions:  (2158) Zofran ODT, 4 mg, PO   (0131) Normal Saline, 1 liter, IV bolus   (0131) Toradol, 30 mg, IV injection     Emergency Department Course:  Nursing notes and vitals reviewed.    I entered the room, obtained the history, and performed an exam of the patient as documented above.    A peripheral IV was established. Blood was drawn from the patient. This was sent for laboratory testing, findings above.      Urine sample was obtained and sent for laboratory analysis, findings above.     The patient was sent for an abdominal ultrasound while in the emergency department, findings above.       Findings and plan explained to the patient. Patient discharged home with instructions regarding supportive care, medications, and reasons to return. The importance of close follow-up was reviewed. The patient was prescribed Zofran.      I personally  reviewed the laboratory results with the patient and answered all related questions prior to discharge.      Impression & Plan    Medical Decision Making:  Malgorzata Ramachandran is a 23-year-old female who presented complaining of nausea and vomiting after eating.  The recurrent nature of this sounds suspicious for gastritis, biliary colic, but I considered diverticulitis and bowel obstruction, amongst other causes such as pregnancy as well.  However, the patient was negative for all of these.  The ultrasound shows a gallbladder polyp which may be playing some part, but she is feeling much better after hydration.  She was discharged home and I did instruct her to follow-up with her primary care doctor and she is feeling greatly improved.    Diagnosis:    ICD-10-CM   1. Abdominal pain, epigastric R10.13   2. Nausea and vomiting, intractability of vomiting not specified, unspecified vomiting type R11.2   3. Biliary colic K80.50   4. Elevated blood pressure reading without diagnosis of hypertension R03.0   2  Disposition:  discharged to home    Discharge Medications:   Details   ondansetron (ZOFRAN ODT) 4 MG ODT tab Take 1 tablet (4 mg) by mouth every 8 hours as needed for nausea, Disp-10 tablet, R-0, Local Print           [facility] EMERGENCY DEPARTMENT      Scribe disclosure:  I, Jerson Caceres, am serving as a scribe on 1/7/2018 at 11:55 PM to personally document services performed by Dr. Blount based on my observations and the provider's statements to me.               Jerardo Blount MD  01/08/18 0596

## 2018-09-24 ENCOUNTER — HOSPITAL ENCOUNTER (EMERGENCY)
Facility: CLINIC | Age: 23
Discharge: HOME | End: 2018-09-24
Attending: EMERGENCY MEDICINE | Admitting: EMERGENCY MEDICINE
Payer: COMMERCIAL

## 2018-09-24 ENCOUNTER — APPOINTMENT (OUTPATIENT)
Dept: CT IMAGING | Facility: CLINIC | Age: 23
End: 2018-09-24
Attending: EMERGENCY MEDICINE
Payer: COMMERCIAL

## 2018-09-24 VITALS
OXYGEN SATURATION: 99 % | SYSTOLIC BLOOD PRESSURE: 115 MMHG | HEART RATE: 88 BPM | WEIGHT: 180 LBS | RESPIRATION RATE: 20 BRPM | DIASTOLIC BLOOD PRESSURE: 58 MMHG | BODY MASS INDEX: 33.13 KG/M2 | TEMPERATURE: 98.2 F | HEIGHT: 62 IN

## 2018-09-24 DIAGNOSIS — S06.0X0A CONCUSSION WITHOUT LOSS OF CONSCIOUSNESS, INITIAL ENCOUNTER: ICD-10-CM

## 2018-09-24 LAB
ANION GAP SERPL CALCULATED.3IONS-SCNC: 7 MMOL/L (ref 3–14)
B-HCG FREE SERPL-ACNC: <5 IU/L
BASOPHILS # BLD AUTO: 0 10E9/L (ref 0–0.2)
BASOPHILS NFR BLD AUTO: 0.3 %
BUN SERPL-MCNC: 14 MG/DL (ref 7–30)
CALCIUM SERPL-MCNC: 8.6 MG/DL (ref 8.5–10.1)
CHLORIDE SERPL-SCNC: 106 MMOL/L (ref 94–109)
CO2 SERPL-SCNC: 25 MMOL/L (ref 20–32)
CREAT SERPL-MCNC: 0.71 MG/DL (ref 0.52–1.04)
DIFFERENTIAL METHOD BLD: NORMAL
EOSINOPHIL # BLD AUTO: 0.1 10E9/L (ref 0–0.7)
EOSINOPHIL NFR BLD AUTO: 0.9 %
ERYTHROCYTE [DISTWIDTH] IN BLOOD BY AUTOMATED COUNT: 12.8 % (ref 10–15)
GFR SERPL CREATININE-BSD FRML MDRD: >90 ML/MIN/1.7M2
GLUCOSE SERPL-MCNC: 73 MG/DL (ref 70–99)
HCT VFR BLD AUTO: 38.4 % (ref 35–47)
HGB BLD-MCNC: 12.9 G/DL (ref 11.7–15.7)
IMM GRANULOCYTES # BLD: 0 10E9/L (ref 0–0.4)
IMM GRANULOCYTES NFR BLD: 0.3 %
LYMPHOCYTES # BLD AUTO: 2.6 10E9/L (ref 0.8–5.3)
LYMPHOCYTES NFR BLD AUTO: 27.9 %
MCH RBC QN AUTO: 30.5 PG (ref 26.5–33)
MCHC RBC AUTO-ENTMCNC: 33.6 G/DL (ref 31.5–36.5)
MCV RBC AUTO: 91 FL (ref 78–100)
MONOCYTES # BLD AUTO: 0.8 10E9/L (ref 0–1.3)
MONOCYTES NFR BLD AUTO: 8.1 %
NEUTROPHILS # BLD AUTO: 5.8 10E9/L (ref 1.6–8.3)
NEUTROPHILS NFR BLD AUTO: 62.5 %
NRBC # BLD AUTO: 0 10*3/UL
NRBC BLD AUTO-RTO: 0 /100
PLATELET # BLD AUTO: 250 10E9/L (ref 150–450)
POTASSIUM SERPL-SCNC: 3.7 MMOL/L (ref 3.4–5.3)
RBC # BLD AUTO: 4.23 10E12/L (ref 3.8–5.2)
SODIUM SERPL-SCNC: 138 MMOL/L (ref 133–144)
WBC # BLD AUTO: 9.2 10E9/L (ref 4–11)

## 2018-09-24 PROCEDURE — 84702 CHORIONIC GONADOTROPIN TEST: CPT

## 2018-09-24 PROCEDURE — 25000132 ZZH RX MED GY IP 250 OP 250 PS 637: Performed by: EMERGENCY MEDICINE

## 2018-09-24 PROCEDURE — 96375 TX/PRO/DX INJ NEW DRUG ADDON: CPT

## 2018-09-24 PROCEDURE — 96374 THER/PROPH/DIAG INJ IV PUSH: CPT

## 2018-09-24 PROCEDURE — 96361 HYDRATE IV INFUSION ADD-ON: CPT

## 2018-09-24 PROCEDURE — 80048 BASIC METABOLIC PNL TOTAL CA: CPT | Performed by: EMERGENCY MEDICINE

## 2018-09-24 PROCEDURE — 99285 EMERGENCY DEPT VISIT HI MDM: CPT | Mod: 25

## 2018-09-24 PROCEDURE — 85025 COMPLETE CBC W/AUTO DIFF WBC: CPT | Performed by: EMERGENCY MEDICINE

## 2018-09-24 PROCEDURE — 70450 CT HEAD/BRAIN W/O DYE: CPT

## 2018-09-24 PROCEDURE — 25000128 H RX IP 250 OP 636: Performed by: EMERGENCY MEDICINE

## 2018-09-24 RX ORDER — IBUPROFEN 200 MG
600 TABLET ORAL EVERY 6 HOURS PRN
Qty: 60 TABLET | Refills: 0 | Status: SHIPPED | OUTPATIENT
Start: 2018-09-24 | End: 2019-02-14

## 2018-09-24 RX ORDER — ONDANSETRON 2 MG/ML
4 INJECTION INTRAMUSCULAR; INTRAVENOUS ONCE
Status: COMPLETED | OUTPATIENT
Start: 2018-09-24 | End: 2018-09-24

## 2018-09-24 RX ORDER — HYDROCODONE BITARTRATE AND ACETAMINOPHEN 5; 325 MG/1; MG/1
1 TABLET ORAL ONCE
Status: COMPLETED | OUTPATIENT
Start: 2018-09-24 | End: 2018-09-24

## 2018-09-24 RX ORDER — KETOROLAC TROMETHAMINE 15 MG/ML
15 INJECTION, SOLUTION INTRAMUSCULAR; INTRAVENOUS ONCE
Status: COMPLETED | OUTPATIENT
Start: 2018-09-24 | End: 2018-09-24

## 2018-09-24 RX ORDER — ONDANSETRON 4 MG/1
4 TABLET, ORALLY DISINTEGRATING ORAL EVERY 8 HOURS PRN
Qty: 10 TABLET | Refills: 0 | Status: SHIPPED | OUTPATIENT
Start: 2018-09-24 | End: 2019-02-14

## 2018-09-24 RX ORDER — ACETAMINOPHEN 325 MG/1
650 TABLET ORAL ONCE
Status: COMPLETED | OUTPATIENT
Start: 2018-09-24 | End: 2018-09-24

## 2018-09-24 RX ADMIN — ONDANSETRON 4 MG: 2 INJECTION INTRAMUSCULAR; INTRAVENOUS at 00:34

## 2018-09-24 RX ADMIN — HYDROCODONE BITARTRATE AND ACETAMINOPHEN 1 TABLET: 5; 325 TABLET ORAL at 01:46

## 2018-09-24 RX ADMIN — ACETAMINOPHEN 650 MG: 325 TABLET, FILM COATED ORAL at 00:34

## 2018-09-24 RX ADMIN — SODIUM CHLORIDE 1000 ML: 9 INJECTION, SOLUTION INTRAVENOUS at 00:32

## 2018-09-24 RX ADMIN — KETOROLAC TROMETHAMINE 15 MG: 15 INJECTION, SOLUTION INTRAMUSCULAR; INTRAVENOUS at 02:23

## 2018-09-24 ASSESSMENT — ENCOUNTER SYMPTOMS
BACK PAIN: 0
NAUSEA: 1
HEADACHES: 1
LIGHT-HEADEDNESS: 1
NECK PAIN: 0

## 2018-09-24 NOTE — ED PROVIDER NOTES
History     Chief Complaint:  Fall    The history is provided by the patient.      Malgorzata Ramachandran is an otherwise healthy 23 year old female who presents to the emergency department today for evaluation after a fall. The patient reports last night, she was in the kitchen with her daughter when she accidentally slipped on water on the floor causing her to fall backwards hitting her head on the floor. She is unsure if she lost consciousness, but her eyes stayed shut during this fall and she could hear her daughter the whole time. Throughout the day today, her headache has continued to worsen with associated light headedness and nausea. She was concerned about the a possible head injury prompting her visit to the emergency department. She denies neck pain, back pain, other extremity pain, blood on the floor after the fall, chance of pregnancy and family history of brain issues or injuries. Of note, the patient is currently on her period.     Allergies:  No Known Drug Allergies    Medications:    The patient is currently on no regular medications.    Past Medical History:    Abnormal Pap smear    Past Surgical History:    Anesthesia lower arm skin surgery  Biopsy of skin lesion   section x2    Family History:    History reviewed. No pertinent family history.     Social History:  The patient was alone.  Smoking Status: Never  Smokeless Tobacco: Never  Alcohol Use: No  Marital Status:       Review of Systems   Gastrointestinal: Positive for nausea.   Musculoskeletal: Negative for back pain and neck pain.   Neurological: Positive for light-headedness and headaches. Negative for syncope.   All other systems reviewed and are negative.    Physical Exam     Patient Vitals for the past 24 hrs:   BP Temp Temp src Pulse Resp SpO2 Height Weight   18 0215 - - - - - 99 % - -   18 0200 115/58 - - - - - - -   18 0145 - - - - - 100 % - -   18 0130 125/74 - - - - - - -   18 0127  "147/89 - - - - 98 % - -   09/24/18 0036 - - - - - 100 % - -   09/24/18 0035 129/76 - - - - - - -   09/24/18 0006 (!) 150/101 98.2  F (36.8  C) Temporal 88 20 98 % 1.575 m (5' 2\") 81.6 kg (180 lb)         Physical Exam  Constitutional: Vital signs reviewed as above. Patient appears uncomfortable.  HENT:    Head: No external signs of trauma noted.   Eyes: Pupils are equal, round, and reactive to light.   Cardiovascular: Normal rate, regular rhythm, normal heart sounds and intact distal pulses.    Pulmonary/Chest: Effort normal and breath sounds normal. No respiratory distress. No wheezes noted.   Abdominal: Soft. There is no tenderness. There is no rebound.   Musculoskeletal:   No deformities appreciated   No edema noted  Neurological:    Patient is alert and oriented to person, place, and time.    Speech is fluent, cognition is normal.   CN 2-12 intact (PERRL, EOMI, symmetric smile, equal eye squeeze and forehead raise, normal and equal sensation to bilateral forehead/cheek/chin, equal hearing to finger rub, midline tongue protrusion with nl side-to-side movement, normal shoulder shrug).    RUE strength 5/5: , finger abd, wrist flex/ext, elbow flex/ext.    LUE strength 5/5: , finger abd, wrist flex/ext, elbow flex/ext.    RLE strength 5/5: ankle flex/ext, knee flex/ext, hip flex.    LLE strength 5/5: ankle flex/ext, knee flex/ext, hip flex.    Sensation equal in all 4 extremities.    No arm drift.     Cerebellar: Normal rapid alternating movements     ( finger-nose-finger, rapid pronation/supination, hand rolling)    Normal heel-to-shin   Normal gait.   Skin: Skin is warm and dry.   Psychiatric: The patient appears calm    Emergency Department Course   Imaging:  Radiology findings were communicated with the patient who voiced understanding of the findings.  Head CT w/o contrast  IMPRESSION: No evidence of acute intracranial abnormality.  Report per radiology     Laboratory:  Laboratory findings were " communicated with the patient who voiced understanding of the findings.  CBC: WNL. (WBC 9.2, HGB 12.9, )   BMP: AWNL (Creatinine 0.71)  ISTAT HCG quantitative pregnancy POCT: <5.0    Interventions:  0032 NS Bolus 1,000mL IV  0034 Zofran 4mg IV  0034 Tylenol 650 mg PO  0146 Norco 5-325mg 1 Tablet PO  0223 Toradol 15 mg IV    Emergency Department Course:  Nursing notes and vitals reviewed.  IV was inserted and blood was drawn for laboratory testing, results above.  The patient was sent for a Head CT w/o contrast while in the emergency department, results above.   0011: I performed an exam of the patient as documented above.   0131: Patient rechecked and updated.   0207: Patient rechecked and updated.   Findings and plan explained to the Patient. Patient discharged home with instructions regarding supportive care, medications, and reasons to return. The importance of close follow-up was reviewed. The patient was prescribed Ibuprofen and Zofran.   I personally reviewed the laboratory and imaging results with the Patient and answered all related questions prior to discharge.    Impression & Plan    Medical Decision Making:  This 23-year-old female patient presents to the ED due to headache, nausea, and dizziness.  Please see the HPI and exam for specifics.  The patient notes that yesterday she slipped on water at her home and fell and hit the back of her head.  Her neuro exam today is normal.  Clinically, I believe she has a concussion. CT imaging of the patient's head does not reveal intracranial hemorrhage or skull fracture.  At this time I believe she is stable for discharge.  She notes she does not have a primary clinic to follow us with a list the on-call clinic for today but also place an order for the [place] concussion . Anticipatory guidance given prior to discharge.    Diagnosis:    ICD-10-CM    1. Concussion without loss of consciousness, initial encounter S06.0X0A CONCUSSION   REFERRAL       Disposition:  discharged to home    Discharge Medications:  New Prescriptions    IBUPROFEN (ADVIL/MOTRIN) 200 MG TABLET    Take 3 tablets (600 mg) by mouth every 6 hours as needed for mild pain or fever    ONDANSETRON (ZOFRAN ODT) 4 MG ODT TAB    Take 1 tablet (4 mg) by mouth every 8 hours as needed for nausea       Scribe Disclosure:  Devon SENA, am serving as a scribe at 12:11 AM on 9/24/2018 to document services personally performed by Yossi Rubio DO based on my observations and the provider's statements to me.     9/24/2018   [facility] EMERGENCY DEPARTMENT       Yossi Rubio DO  09/24/18 0238

## 2018-09-24 NOTE — LETTER
September 24, 2018      To Whom It May Concern:      Malgorzata Ramachandran was seen in our Emergency Department today, 09/24/18.  I expect her condition to improve over the next 1-2 days.  She may return to work when improved.    Sincerely,              Yossi Rubio, DO

## 2018-09-24 NOTE — ED TRIAGE NOTES
Pt to ER with c/o dizziness and nausea since she slipped on water on the floor yest striking the back of her head, pt has no trauma to that area

## 2018-09-24 NOTE — DISCHARGE INSTRUCTIONS
"  Concussion    A concussion can be caused by a direct blow to the head, neck, face, or somewhere else on the body with the force being transmitted to the head. This may cause you to lose consciousness - be \"knocked out\" - but not always. Depending on the severity of the blow, it will take from a few hours up to a few days to get better. Sometimes symptoms may last a few months or longer. This is called post-concussion syndrome.  At first, you may have a headache, nausea, vomiting, or dizziness. You may also have problems concentrating or remembering things. This is normal.  Symptoms should get better as the hours and days go by. Symptoms that get worse could be a sign of a more serious injury. This might be a bruise or bleeding in the brain. That s why it s important to watch for the warning signs listed below.  Home care  If your injury is mild and there are no serious signs or symptoms, your healthcare provider may recommend that you be monitored at home. If there is evidence that the injury is more serious, you will be monitored in the hospital. Follow these tips to help care for yourself at home:    After a concussion, your healthcare provider may recommend that a family member or friend monitor you for 12 to 24 hours. They may be told to wake you every few hours during sleep to check for the signs below.    If your face or scalp swells, apply an ice pack for 20 minutes every 1 to 2 hours. Do this until the swelling starts to go down. You can make an ice pack by putting ice cubes in a plastic bag and wrapping the bag in a towel.    You may use acetaminophen to control pain, unless another pain medicine was prescribed. Do not use aspirin or ibuprofen after a head injury. If you have chronic liver or kidney disease, talk with your doctor before using these medicines. Also talk with your doctor if you ever had a stomach ulcer or gastrointestinal bleeding.    For the next 24 hours:  ? Don t drink alcohol or take " sedatives or medicines that make you sleepy.  ? Don t drive or operate machinery.  ? Avoid doing anything strenuous. Don t lift or strain.    Don t return to sports or any activity that could cause you to hit your head until all symptoms are gone and you have been cleared by your doctor. A second head injury before fully recovering from the first one can lead to serious brain injury.    Avoid doing activities that require a lot of concentration or a lot of attention. This will allow your brain to rest and heal quicker.  Follow-up care  Follow up with your doctor in 1 week, or as directed.  Note: A radiologist will review any X-rays or CT scans that were taken. You will be told of any new findings that may affect your care.  When to seek medical advice  Call your healthcare provider right away if any of these occur:    Repeated vomiting    Headache or dizziness that is severe or gets worse    Loss of consciousness    Unusual drowsiness, or unable to wake up as usual    Weakness or decreased ability to walk or move any limb    Confusion, agitation, or change in behavior or speech, or memory loss    Blurred vision    Convulsion (seizure)    Swelling on the scalp or face that gets worse    Changes in pupil size (the black part of the eye)    Redness, warmth, or pus from the swollen area    Fluid draining from or bleeding from the nose or ears     Date Last Reviewed: 8/14/2015 2000-2017 [employer]. [address] [place] [address], [place] [address]. All rights reserved. This information is not intended as a substitute for professional medical care. Always follow your healthcare professional's instructions.

## 2018-09-24 NOTE — ED AVS SNAPSHOT
[facility] Emergency Department    [address]    [facility] [address]    Phone:  [phone]    Fax:  [phone]                                       Malgorzata Ramachandran   MRN: [id number]    Department:  [facility] Emergency Department   Date of Visit:  9/24/2018           After Visit Summary Signature Page     I have received my discharge instructions, and my questions have been answered. I have discussed any challenges I see with this plan with the nurse or doctor.    ..........................................................................................................................................  Patient/Patient Representative Signature      ..........................................................................................................................................  Patient Representative Print Name and Relationship to Patient    ..................................................               ................................................  Date                                   Time    ..........................................................................................................................................  Reviewed by Signature/Title    ...................................................              ..............................................  Date                                               Time          22EPIC Rev 08/18

## 2018-09-24 NOTE — ED AVS SNAPSHOT
" [facility] Emergency Department    [address]    [facility] [address]    Phone:  [phone]    Fax:  [phone]                                       Malgorzata Ramachandran   MRN: [id number]    Department:  [facility] Emergency Department   Date of Visit:  9/24/2018           Patient Information     Date Of Birth          1995        Your diagnoses for this visit were:     Concussion without loss of consciousness, initial encounter        You were seen by Yossi Rubio DO.      Follow-up Information     Follow up with [facility]. Call today.    Specialty:  Family Medicine    Why:  To schedule follow up care and to establish care with a primary care clinic    Contact information:    [id number]  [facility] [address] [phone]        Discharge Instructions         Concussion    A concussion can be caused by a direct blow to the head, neck, face, or somewhere else on the body with the force being transmitted to the head. This may cause you to lose consciousness - be \"knocked out\" - but not always. Depending on the severity of the blow, it will take from a few hours up to a few days to get better. Sometimes symptoms may last a few months or longer. This is called post-concussion syndrome.  At first, you may have a headache, nausea, vomiting, or dizziness. You may also have problems concentrating or remembering things. This is normal.  Symptoms should get better as the hours and days go by. Symptoms that get worse could be a sign of a more serious injury. This might be a bruise or bleeding in the brain. That s why it s important to watch for the warning signs listed below.  Home care  If your injury is mild and there are no serious signs or symptoms, your healthcare provider may recommend that you be monitored at home. If there is evidence that the injury is more serious, you will be monitored in the hospital. Follow " these tips to help care for yourself at home:    After a concussion, your healthcare provider may recommend that a family member or friend monitor you for 12 to 24 hours. They may be told to wake you every few hours during sleep to check for the signs below.    If your face or scalp swells, apply an ice pack for 20 minutes every 1 to 2 hours. Do this until the swelling starts to go down. You can make an ice pack by putting ice cubes in a plastic bag and wrapping the bag in a towel.    You may use acetaminophen to control pain, unless another pain medicine was prescribed. Do not use aspirin or ibuprofen after a head injury. If you have chronic liver or kidney disease, talk with your doctor before using these medicines. Also talk with your doctor if you ever had a stomach ulcer or gastrointestinal bleeding.    For the next 24 hours:  ? Don t drink alcohol or take sedatives or medicines that make you sleepy.  ? Don t drive or operate machinery.  ? Avoid doing anything strenuous. Don t lift or strain.    Don t return to sports or any activity that could cause you to hit your head until all symptoms are gone and you have been cleared by your doctor. A second head injury before fully recovering from the first one can lead to serious brain injury.    Avoid doing activities that require a lot of concentration or a lot of attention. This will allow your brain to rest and heal quicker.  Follow-up care  Follow up with your doctor in 1 week, or as directed.  Note: A radiologist will review any X-rays or CT scans that were taken. You will be told of any new findings that may affect your care.  When to seek medical advice  Call your healthcare provider right away if any of these occur:    Repeated vomiting    Headache or dizziness that is severe or gets worse    Loss of consciousness    Unusual drowsiness, or unable to wake up as usual    Weakness or decreased ability to walk or move any limb    Confusion, agitation, or change in  behavior or speech, or memory loss    Blurred vision    Convulsion (seizure)    Swelling on the scalp or face that gets worse    Changes in pupil size (the black part of the eye)    Redness, warmth, or pus from the swollen area    Fluid draining from or bleeding from the nose or ears     Date Last Reviewed: 8/14/2015 2000-2017 [employer]. [address] [place] [address]. All rights reserved. This information is not intended as a substitute for professional medical care. Always follow your healthcare professional's instructions.          24 Hour Appointment Hotline       To make an appointment at any [facility] clinic, call [phone] ([phone]). If you don't have a family doctor or clinic, we will help you find one. [facility] clinics are conveniently located to serve the needs of you and your family.          ED Discharge Orders     CONCUSSION  REFERRAL       You have been referred to [facility] Concussion  service.    The  Representative will assist you in the coordination of your concussion care as prescribed by your provider.    The  Representative will contact you within one business day, or you may contact the  Representative at [phone].    Referral Options:  Non-Sports related concussion management    Coverage of these services are subject to the terms and limitations of your health insurance plan.  Please call member services at your health plan with any benefit or coverage questions.     If X-rays, CT or MRI's have been performed, please contact the facility where they were done, to arrange for  prior to your scheduled appointment.  Please bring this referral request to your appointment and present it to your specialist.                     Review of your medicines      START taking        Dose / Directions Last dose taken    ibuprofen 200 MG tablet   Commonly known as:  ADVIL/MOTRIN   Dose:  600 mg   Quantity:  60  tablet        Take 3 tablets (600 mg) by mouth every 6 hours as needed for mild pain or fever   Refills:  0        ondansetron 4 MG ODT tab   Commonly known as:  ZOFRAN ODT   Dose:  4 mg   Quantity:  10 tablet        Take 1 tablet (4 mg) by mouth every 8 hours as needed for nausea   Refills:  0                Prescriptions were sent or printed at these locations (2 Prescriptions)                   Other Prescriptions                Printed at Department/Unit printer (2 of 2)         ibuprofen (ADVIL/MOTRIN) 200 MG tablet               ondansetron (ZOFRAN ODT) 4 MG ODT tab                Procedures and tests performed during your visit     Basic metabolic panel    CBC with platelets differential    Head CT w/o contrast    ISTAT HCG Quantitative Pregnancy Nursing POCT    ISTAT HCG Quantitative Pregnancy POCT    Peripheral IV catheter      Orders Needing Specimen Collection     None      Pending Results     Date and Time Order Name Status Description    9/24/2018 0019 Head CT w/o contrast Preliminary             Pending Culture Results     No orders found from 9/22/2018 to 9/25/2018.            Pending Results Instructions     If you had any lab results that were not finalized at the time of your Discharge, you can call the ED Lab Result RN at [phone]. You will be contacted by this team for any positive Lab results or changes in treatment. The nurses are available 7 days a week from 10A to 6:30P.  You can leave a message 24 hours per day and they will return your call.        Test Results From Your Hospital Stay        9/24/2018 12:36 AM      Component Results     Component Value Ref Range & Units Status    WBC 9.2 4.0 - 11.0 10e9/L Final    RBC Count 4.23 3.8 - 5.2 10e12/L Final    Hemoglobin 12.9 11.7 - 15.7 g/dL Final    Hematocrit 38.4 35.0 - 47.0 % Final    MCV 91 78 - 100 fl Final    MCH 30.5 26.5 - 33.0 pg Final    MCHC 33.6 31.5 - 36.5 g/dL Final    RDW 12.8 10.0 - 15.0 % Final    Platelet Count 250 150 -  450 10e9/L Final    Diff Method Automated Method  Final    % Neutrophils 62.5 % Final    % Lymphocytes 27.9 % Final    % Monocytes 8.1 % Final    % Eosinophils 0.9 % Final    % Basophils 0.3 % Final    % Immature Granulocytes 0.3 % Final    Nucleated RBCs 0 0 /100 Final    Absolute Neutrophil 5.8 1.6 - 8.3 10e9/L Final    Absolute Lymphocytes 2.6 0.8 - 5.3 10e9/L Final    Absolute Monocytes 0.8 0.0 - 1.3 10e9/L Final    Absolute Eosinophils 0.1 0.0 - 0.7 10e9/L Final    Absolute Basophils 0.0 0.0 - 0.2 10e9/L Final    Abs Immature Granulocytes 0.0 0 - 0.4 10e9/L Final    Absolute Nucleated RBC 0.0  Final         9/24/2018 12:49 AM      Component Results     Component Value Ref Range & Units Status    Sodium 138 133 - 144 mmol/L Final    Potassium 3.7 3.4 - 5.3 mmol/L Final    Chloride 106 94 - 109 mmol/L Final    Carbon Dioxide 25 20 - 32 mmol/L Final    Anion Gap 7 3 - 14 mmol/L Final    Glucose 73 70 - 99 mg/dL Final    Urea Nitrogen 14 7 - 30 mg/dL Final    Creatinine 0.71 0.52 - 1.04 mg/dL Final    GFR Estimate >90 >60 mL/min/1.7m2 Final    Non  GFR Calc    GFR Estimate If Black >90 >60 mL/min/1.7m2 Final    African American GFR Calc    Calcium 8.6 8.5 - 10.1 mg/dL Final         9/24/2018  1:54 AM      Narrative     CT HEAD WITHOUT CONTRAST   9/24/2018 1:11 AM     HISTORY: Fell yesterday. Hit the occiput. Possible loss of  consciousness.    COMPARISON: None.    TECHNIQUE: Without intravenous contrast, helical sections were  acquired through the brain. Coronal reconstructions were generated.  Radiation dose for this scan was reduced using automated exposure  control, adjustment of the mA and/or kV according to the patient's  size, or iterative reconstruction technique.    FINDINGS: No intra-axial mass, mass effect or midline shift. Normal  gray-white matter differentiation. No visualized acute intra-axial  hemorrhage. The cerebral ventricles are normal in caliber. The basal  cisterns are patent.  No extra-axial fluid collection. The visualized  portions of the paranasal sinuses and mastoid air cells are  unremarkable.        Impression     IMPRESSION: No evidence of acute intracranial abnormality.         9/24/2018 12:40 AM      Component Results     Component Value Ref Range & Units Status    HCG Quantitative Serum <5.0 <5.0 IU/L Final                Clinical Quality Measure: Blood Pressure Screening     Your blood pressure was checked while you were in the emergency department today. The last reading we obtained was  BP: 115/58 . Please read the guidelines below about what these numbers mean and what you should do about them.  If your systolic blood pressure (the top number) is less than 120 and your diastolic blood pressure (the bottom number) is less than 80, then your blood pressure is normal. There is nothing more that you need to do about it.  If your systolic blood pressure (the top number) is 120-139 or your diastolic blood pressure (the bottom number) is 80-89, your blood pressure may be higher than it should be. You should have your blood pressure rechecked within a year by a primary care provider.  If your systolic blood pressure (the top number) is 140 or greater or your diastolic blood pressure (the bottom number) is 90 or greater, you may have high blood pressure. High blood pressure is treatable, but if left untreated over time it can put you at risk for heart attack, stroke, or kidney failure. You should have your blood pressure rechecked by a primary care provider within the next 4 weeks.  If your provider in the emergency department today gave you specific instructions to follow-up with your doctor or provider even sooner than that, you should follow that instruction and not wait for up to 4 weeks for your follow-up visit.        Thank you for choosing [facility]       Thank you for choosing [facility] for your care. Our goal is always to provide you with excellent care. Hearing back from our  patients is one way we can continue to improve our services. Please take a few minutes to complete the written survey that you may receive in the mail after you visit with us. Thank you!        [employer] Information     [facility] gives you secure access to your electronic health record. If you see a primary care provider, you can also send messages to your care team and make appointments. If you have questions, please call your primary care clinic.  If you do not have a primary care provider, please call [phone] and they will assist you.        Care EveryWhere ID     This is your Care EveryWhere ID. This could be used by other organizations to access your [place] medical records  [id number]        Equal Access to Services     BRITTA YOST : Heladio Weeks, allison soto, aniyah olivas, gordon naranjo. So [facility] [phone].    ATENCIÓN: Si habla español, tiene a luna disposición servicios gratuitos de asistencia lingüística. Llame al [phone].    We comply with applicable federal civil rights laws and [place] laws. We do not discriminate on the basis of race, color, national origin, age, disability, sex, sexual orientation, or gender identity.            After Visit Summary       This is your record. Keep this with you and show to your community pharmacist(s) and doctor(s) at your next visit.

## 2018-11-06 ENCOUNTER — HOSPITAL ENCOUNTER (EMERGENCY)
Facility: CLINIC | Age: 23
Discharge: HOME | End: 2018-11-06
Attending: EMERGENCY MEDICINE | Admitting: EMERGENCY MEDICINE
Payer: COMMERCIAL

## 2018-11-06 VITALS
TEMPERATURE: 98.5 F | DIASTOLIC BLOOD PRESSURE: 81 MMHG | HEART RATE: 83 BPM | SYSTOLIC BLOOD PRESSURE: 133 MMHG | OXYGEN SATURATION: 98 % | WEIGHT: 170 LBS | BODY MASS INDEX: 31.09 KG/M2 | RESPIRATION RATE: 18 BRPM

## 2018-11-06 DIAGNOSIS — J02.0 STREPTOCOCCAL PHARYNGITIS: ICD-10-CM

## 2018-11-06 LAB
DEPRECATED S PYO AG THROAT QL EIA: ABNORMAL
SPECIMEN SOURCE: ABNORMAL

## 2018-11-06 PROCEDURE — 25000128 H RX IP 250 OP 636: Performed by: EMERGENCY MEDICINE

## 2018-11-06 PROCEDURE — 99284 EMERGENCY DEPT VISIT MOD MDM: CPT | Mod: 25

## 2018-11-06 PROCEDURE — 96374 THER/PROPH/DIAG INJ IV PUSH: CPT

## 2018-11-06 PROCEDURE — 96361 HYDRATE IV INFUSION ADD-ON: CPT

## 2018-11-06 PROCEDURE — 96375 TX/PRO/DX INJ NEW DRUG ADDON: CPT

## 2018-11-06 PROCEDURE — 87880 STREP A ASSAY W/OPTIC: CPT | Performed by: EMERGENCY MEDICINE

## 2018-11-06 RX ORDER — KETOROLAC TROMETHAMINE 15 MG/ML
15 INJECTION, SOLUTION INTRAMUSCULAR; INTRAVENOUS ONCE
Status: COMPLETED | OUTPATIENT
Start: 2018-11-06 | End: 2018-11-06

## 2018-11-06 RX ORDER — DEXAMETHASONE SODIUM PHOSPHATE 10 MG/ML
10 INJECTION, SOLUTION INTRAMUSCULAR; INTRAVENOUS ONCE
Status: COMPLETED | OUTPATIENT
Start: 2018-11-06 | End: 2018-11-06

## 2018-11-06 RX ORDER — PENICILLIN V POTASSIUM 500 MG/1
500 TABLET, FILM COATED ORAL 2 TIMES DAILY
Qty: 20 TABLET | Refills: 0 | Status: SHIPPED | OUTPATIENT
Start: 2018-11-06 | End: 2019-02-14

## 2018-11-06 RX ADMIN — SODIUM CHLORIDE 1000 ML: 9 INJECTION, SOLUTION INTRAVENOUS at 05:32

## 2018-11-06 RX ADMIN — DEXAMETHASONE SODIUM PHOSPHATE 10 MG: 10 INJECTION, SOLUTION INTRAMUSCULAR; INTRAVENOUS at 05:32

## 2018-11-06 RX ADMIN — KETOROLAC TROMETHAMINE 15 MG: 15 INJECTION, SOLUTION INTRAMUSCULAR; INTRAVENOUS at 05:32

## 2018-11-06 ASSESSMENT — ENCOUNTER SYMPTOMS
COUGH: 0
SORE THROAT: 1
FEVER: 0
TROUBLE SWALLOWING: 1

## 2018-11-06 NOTE — DISCHARGE INSTRUCTIONS
Pharyngitis: Strep (Confirmed)    You have had a positive test for strep throat. Strep throat is a contagious illness. It is spread by coughing, kissing or by touching others after touching your mouth or nose. Symptoms include throat pain that is worse with swallowing, aching all over, headache, and fever. It is treated with antibiotic medicine. This should help you start to feel better in 1 to 2 days.  Home care    Rest at home. Drink plenty of fluids to you won't get dehydrated.    No work or school for the first 2 days of taking the antibiotics. After this time, you will not be contagious. You can then return to school or work if you are feeling better.     Take antibiotic medicine for the full 10 days, even if you feel better. This is very important to ensure the infection is treated. It is also important to prevent medicine-resistant germs from developing. If you were given an antibiotic shot, you don't need any more antibiotics.    You may use acetaminophen or ibuprofen to control pain or fever, unless another medicine was prescribed for this. Talk with your healthcare provider before taking these medicines if you have chronic liver or kidney disease. Also talk with your healthcare provider if you have had a stomach ulcer or GI bleeding.    Throat lozenges or sprays help reduce pain. Gargling with warm saltwater will also reduce throat pain. Dissolve 1/2 teaspoon of salt in 1 glass of warm water. This may be useful just before meals.     Soft foods are OK. Don't eat salty or spicy foods.  Follow-up care  Follow up with your healthcare provider or our staff if you don't get better over the next week.  When to seek medical advice  Call your healthcare provider right away if any of these occur:    Fever of 100.4 F (38 C) or higher, or as directed by your healthcare provider    New or worsening ear pain, sinus pain, or headache    Painful lumps in the back of neck    Stiff neck    Lymph nodes getting larger or  becoming soft in the middle    You can't swallow liquids or you can't open your mouth wide because of throat pain    Signs of dehydration. These include very dark urine or no urine, sunken eyes, and dizziness.    Trouble breathing or noisy breathing    Muffled voice    Rash  Prevention  Here are steps you can take to help prevent an infection:    Keep good hand washing habits.    Don t have close contact with people who have sore throats, colds, or other upper respiratory infections.    Don t smoke, and stay away from secondhand smoke.  Date Last Reviewed: 11/1/2017 2000-2018 [employer]. [address] [place] [address]. All rights reserved. This information is not intended as a substitute for professional medical care. Always follow your healthcare professional's instructions.

## 2018-11-06 NOTE — ED AVS SNAPSHOT
[facility] Emergency Department    [address]    [facility] [address]    Phone:  [phone]    Fax:  [phone]                                       Malgorzata Ramahcandran   MRN: [id number]    Department:  [facility] Emergency Department   Date of Visit:  11/6/2018           Patient Information     Date Of Birth          1995        Your diagnoses for this visit were:     Streptococcal pharyngitis        You were seen by Ricardo Smith MD.      Follow-up Information     Follow up with [facility] Emergency Department.    Specialty:  EMERGENCY MEDICINE    Why:  If symptoms worsen    Contact information:    [address]  [facility] [address] [phone]        Discharge Instructions         Pharyngitis: Strep (Confirmed)    You have had a positive test for strep throat. Strep throat is a contagious illness. It is spread by coughing, kissing or by touching others after touching your mouth or nose. Symptoms include throat pain that is worse with swallowing, aching all over, headache, and fever. It is treated with antibiotic medicine. This should help you start to feel better in 1 to 2 days.  Home care    Rest at home. Drink plenty of fluids to you won't get dehydrated.    No work or school for the first 2 days of taking the antibiotics. After this time, you will not be contagious. You can then return to school or work if you are feeling better.     Take antibiotic medicine for the full 10 days, even if you feel better. This is very important to ensure the infection is treated. It is also important to prevent medicine-resistant germs from developing. If you were given an antibiotic shot, you don't need any more antibiotics.    You may use acetaminophen or ibuprofen to control pain or fever, unless another medicine was prescribed for this. Talk with your healthcare provider before taking these medicines if you have chronic liver or  kidney disease. Also talk with your healthcare provider if you have had a stomach ulcer or GI bleeding.    Throat lozenges or sprays help reduce pain. Gargling with warm saltwater will also reduce throat pain. Dissolve 1/2 teaspoon of salt in 1 glass of warm water. This may be useful just before meals.     Soft foods are OK. Don't eat salty or spicy foods.  Follow-up care  Follow up with your healthcare provider or our staff if you don't get better over the next week.  When to seek medical advice  Call your healthcare provider right away if any of these occur:    Fever of 100.4 F (38 C) or higher, or as directed by your healthcare provider    New or worsening ear pain, sinus pain, or headache    Painful lumps in the back of neck    Stiff neck    Lymph nodes getting larger or becoming soft in the middle    You can't swallow liquids or you can't open your mouth wide because of throat pain    Signs of dehydration. These include very dark urine or no urine, sunken eyes, and dizziness.    Trouble breathing or noisy breathing    Muffled voice    Rash  Prevention  Here are steps you can take to help prevent an infection:    Keep good hand washing habits.    Don t have close contact with people who have sore throats, colds, or other upper respiratory infections.    Don t smoke, and stay away from secondhand smoke.  Date Last Reviewed: 11/1/2017 2000-2018 [employer]. [address] [place] [address]. All rights reserved. This information is not intended as a substitute for professional medical care. Always follow your healthcare professional's instructions.          24 Hour Appointment Hotline       To make an appointment at any [facility] clinic, call [phone] ([phone]). If you don't have a family doctor or clinic, we will help you find one. [facility] clinics are conveniently located to serve the needs of you and your family.             Review of your medicines      START taking         Dose / Directions Last dose taken    penicillin V potassium 500 MG tablet   Commonly known as:  VEETID   Dose:  500 mg   Quantity:  20 tablet        Take 1 tablet (500 mg) by mouth 2 times daily   Refills:  0          Our records show that you are taking the medicines listed below. If these are incorrect, please call your family doctor or clinic.        Dose / Directions Last dose taken    ibuprofen 200 MG tablet   Commonly known as:  ADVIL/MOTRIN   Dose:  600 mg   Quantity:  60 tablet        Take 3 tablets (600 mg) by mouth every 6 hours as needed for mild pain or fever   Refills:  0                Prescriptions were sent or printed at these locations (1 Prescription)                   Other Prescriptions                Printed at Department/Unit printer (1 of 1)         penicillin V potassium (VEETID) 500 MG tablet                Procedures and tests performed during your visit     Rapid strep screen      Orders Needing Specimen Collection     None      Pending Results     No orders found from 11/4/2018 to 11/7/2018.            Pending Culture Results     No orders found from 11/4/2018 to 11/7/2018.            Pending Results Instructions     If you had any lab results that were not finalized at the time of your Discharge, you can call the ED Lab Result RN at [phone]. You will be contacted by this team for any positive Lab results or changes in treatment. The nurses are available 7 days a week from 10A to 6:30P.  You can leave a message 24 hours per day and they will return your call.        Test Results From Your Hospital Stay        11/6/2018  5:40 AM      Component Results     Component    Specimen Description    Throat    Rapid Strep A Screen (Abnormal)    POSITIVE: Group A Streptococcal antigen detected by immunoassay.                Clinical Quality Measure: Blood Pressure Screening     Your blood pressure was checked while you were in the emergency department today. The last reading we obtained was   BP: 133/81 . Please read the guidelines below about what these numbers mean and what you should do about them.  If your systolic blood pressure (the top number) is less than 120 and your diastolic blood pressure (the bottom number) is less than 80, then your blood pressure is normal. There is nothing more that you need to do about it.  If your systolic blood pressure (the top number) is 120-139 or your diastolic blood pressure (the bottom number) is 80-89, your blood pressure may be higher than it should be. You should have your blood pressure rechecked within a year by a primary care provider.  If your systolic blood pressure (the top number) is 140 or greater or your diastolic blood pressure (the bottom number) is 90 or greater, you may have high blood pressure. High blood pressure is treatable, but if left untreated over time it can put you at risk for heart attack, stroke, or kidney failure. You should have your blood pressure rechecked by a primary care provider within the next 4 weeks.  If your provider in the emergency department today gave you specific instructions to follow-up with your doctor or provider even sooner than that, you should follow that instruction and not wait for up to 4 weeks for your follow-up visit.        Thank you for choosing [facility]       Thank you for choosing [facility] for your care. Our goal is always to provide you with excellent care. Hearing back from our patients is one way we can continue to improve our services. Please take a few minutes to complete the written survey that you may receive in the mail after you visit with us. Thank you!        [employer] Information     [employer] gives you secure access to your electronic health record. If you see a primary care provider, you can also send messages to your care team and make appointments. If you have questions, please call your primary care clinic.  If you do not have a primary care provider, please call [phone] and they will  assist you.        Care EveryWhere ID     This is your Care EveryWhere ID. This could be used by other organizations to access your [facility] medical records  [id number]        Equal Access to Services     BRITTA YOST : Heladio Weeks, allison soto, aniyah lemosmamarguerite olivas, gordon naranjo. So [facility] [phone].    ATENCIÓN: Si habla español, tiene a luna disposición servicios gratuitos de asistencia lingüística. Llame al [phone].    We comply with applicable federal civil rights laws and [place] laws. We do not discriminate on the basis of race, color, national origin, age, disability, sex, sexual orientation, or gender identity.            After Visit Summary       This is your record. Keep this with you and show to your community pharmacist(s) and doctor(s) at your next visit.

## 2018-11-06 NOTE — ED AVS SNAPSHOT
[facility] Emergency Department    [address]    [facility] [address]    Phone:  [phone]    Fax:  [phone]                                       Malgorzata Ramachandran   MRN: [id number]    Department:  [facility] Emergency Department   Date of Visit:  11/6/2018           After Visit Summary Signature Page     I have received my discharge instructions, and my questions have been answered. I have discussed any challenges I see with this plan with the nurse or doctor.    ..........................................................................................................................................  Patient/Patient Representative Signature      ..........................................................................................................................................  Patient Representative Print Name and Relationship to Patient    ..................................................               ................................................  Date                                   Time    ..........................................................................................................................................  Reviewed by Signature/Title    ...................................................              ..............................................  Date                                               Time          22EPIC Rev 08/18

## 2018-11-10 ENCOUNTER — HOSPITAL ENCOUNTER (EMERGENCY)
Facility: CLINIC | Age: 23
Discharge: HOME | End: 2018-11-10
Attending: EMERGENCY MEDICINE | Admitting: EMERGENCY MEDICINE
Payer: COMMERCIAL

## 2018-11-10 VITALS
TEMPERATURE: 98.7 F | DIASTOLIC BLOOD PRESSURE: 69 MMHG | SYSTOLIC BLOOD PRESSURE: 123 MMHG | WEIGHT: 193.12 LBS | BODY MASS INDEX: 35.54 KG/M2 | RESPIRATION RATE: 20 BRPM | HEIGHT: 62 IN | OXYGEN SATURATION: 99 %

## 2018-11-10 DIAGNOSIS — N39.0 UTI (URINARY TRACT INFECTION), UNCOMPLICATED: ICD-10-CM

## 2018-11-10 LAB
ALBUMIN UR-MCNC: 100 MG/DL
APPEARANCE UR: ABNORMAL
BACTERIA #/AREA URNS HPF: ABNORMAL /HPF
BILIRUB UR QL STRIP: NEGATIVE
COLOR UR AUTO: YELLOW
GLUCOSE UR STRIP-MCNC: NEGATIVE MG/DL
HCG UR QL: NEGATIVE
HGB UR QL STRIP: ABNORMAL
KETONES UR STRIP-MCNC: NEGATIVE MG/DL
LEUKOCYTE ESTERASE UR QL STRIP: ABNORMAL
NITRATE UR QL: NEGATIVE
PH UR STRIP: 5 PH (ref 5–7)
RBC #/AREA URNS AUTO: >182 /HPF (ref 0–2)
SOURCE: ABNORMAL
SP GR UR STRIP: 1.02 (ref 1–1.03)
UROBILINOGEN UR STRIP-MCNC: 0 MG/DL (ref 0–2)
WBC #/AREA URNS AUTO: >182 /HPF (ref 0–5)

## 2018-11-10 PROCEDURE — 81025 URINE PREGNANCY TEST: CPT | Performed by: EMERGENCY MEDICINE

## 2018-11-10 PROCEDURE — 99283 EMERGENCY DEPT VISIT LOW MDM: CPT

## 2018-11-10 PROCEDURE — 81001 URINALYSIS AUTO W/SCOPE: CPT | Performed by: EMERGENCY MEDICINE

## 2018-11-10 RX ORDER — PHENAZOPYRIDINE HYDROCHLORIDE 200 MG/1
200 TABLET, FILM COATED ORAL 3 TIMES DAILY PRN
Qty: 9 TABLET | Refills: 0 | Status: SHIPPED | OUTPATIENT
Start: 2018-11-10 | End: 2019-02-14

## 2018-11-10 RX ORDER — NITROFURANTOIN 25; 75 MG/1; MG/1
100 CAPSULE ORAL 2 TIMES DAILY
Qty: 10 CAPSULE | Refills: 0 | Status: SHIPPED | OUTPATIENT
Start: 2018-11-10 | End: 2019-02-14

## 2018-11-10 ASSESSMENT — ENCOUNTER SYMPTOMS
VOMITING: 0
ABDOMINAL DISTENTION: 1
FREQUENCY: 0
FEVER: 0
HEMATURIA: 0
NAUSEA: 0
CHILLS: 1

## 2018-11-10 NOTE — ED AVS SNAPSHOT
[facility] Emergency Department    [address]    [facility] [address]    Phone:  [phone]    Fax:  [phone]                                       Malgorzata Ramachandran   MRN: [id number]    Department:  [facility] Emergency Department   Date of Visit:  11/10/2018           After Visit Summary Signature Page     I have received my discharge instructions, and my questions have been answered. I have discussed any challenges I see with this plan with the nurse or doctor.    ..........................................................................................................................................  Patient/Patient Representative Signature      ..........................................................................................................................................  Patient Representative Print Name and Relationship to Patient    ..................................................               ................................................  Date                                   Time    ..........................................................................................................................................  Reviewed by Signature/Title    ...................................................              ..............................................  Date                                               Time          22EPIC Rev 08/18

## 2018-11-10 NOTE — ED TRIAGE NOTES
"Has had \"the urge to go pee literally every 5 to 10 minutes.  After that I started noticing blood in my urine.\"  States googled her symptoms and she knows she has a \"UTI\". Reports \"lots of pressure when I pee\".  Urinating small amounts at a time. Started last night around 2300.  Denies fever, has been chilled.  Afebrile here.  ABCD's intact; A/O x 4.   "

## 2018-11-10 NOTE — DISCHARGE INSTRUCTIONS
Please make an appointment to follow up with your primary care provider in 3-5 days if not improving.      Discharge Instructions  Urinary Tract Infection  You or your child have been diagnosed with a urinary tract infection, or UTI. The urinary tract includes the kidneys (which make urine/pee), ureters (the tubes that carry urine/pee from the kidneys to the bladder), the bladder (which stores urine/pee), and urethra (the tube that carries urine/pee out of the bladder). Urinary tract infections occur when bacteria travel up the urethra into the bladder (bladder infection) and, in some cases, from there into the kidneys (kidney infection).  Generally, every Emergency Department visit should have a follow-up clinic visit with either a primary or a specialty clinic/provider. Please follow-up as instructed by your emergency provider today.  Return to the Emergency Department if:    You or your child have severe back pain.    You or your child are vomiting (throwing up) so that you cannot take your medicine.    You or your child have a new fever (had not previously had a fever) over 101 F.    You or your child have confusion or are very weak, or feel very ill.    Your child seems much more ill, will not wake up, will not respond right, or is crying for a long time and will not calm down.    You or your child are showing signs of dehydration. These signs may include decreased urination (pee), dry mouth/gums/tongue, or decreased activity.    Follow-up with your provider:     Children under 24 months need to be seen by their regular provider within one week after a diagnosis of a UTI. It may be necessary to do some more tests to look at the child s kidney or bladder.    You should begin to feel better within 24 - 48 hours of starting your antibiotic; follow-up with your regular clinic/doctor/provider if this is not the case.    Treatment:     You will be treated with an antibiotic to kill the bacteria. We have to make an  educated guess, based on what we know about common bacteria and antibiotics, as to which antibiotic will work for your infection. We will be correct most times but there will be some cases where the antibiotic chosen is not correct (see urine cultures below).    Take a pain medication such as acetaminophen (Tylenol ) or ibuprofen (Advil , Motrin , Nuprin ).    Phenazopyridine (Pyridium , Uristat ) is a prescription medication that numbs the bladder to reduce the burning pain of some UTIs.  The same medication is available in a non-prescription version (Azo-Standard , Urodol ). This medication will change the color of the urine and tears (usually blue or orange). If you wear contacts, do not wear them while taking this medication as they may be stained by the medication.    Urine Cultures:    If indicated, a urine culture may have been performed today. This test generally takes 24-48 hours to complete so the results are not known at this time. The results can confirm that an infection is present but also determine which antibiotic is effective for the specific bacteria that is causing the infection. If your urine culture shows that the antibiotic you were given today will not work to treat your infection, we will attempt to contact you to make arrangements to change the antibiotic. If the culture confirms that the antibiotic is effective for your infection, you will not be contacted. We often recommend follow-up with your regular physician/provider on the culture results regardless of this process.    Antibiotic Warning:     If you have been placed on antibiotics - watch for signs of allergic reaction.  These include rash, lip swelling, difficulty breathing, wheezing, and dizziness.  If you develop any of these symptoms, stop the antibiotic immediately and go to an emergency room or urgent care for evaluation.    Probiotics: If you have been given an antibiotic, you may want to also take a probiotic pill or eat  "yogurt with live cultures. Probiotics have \"good bacteria\" to help your intestines stay healthy. Studies have shown that probiotics help prevent diarrhea and other intestine problems (including C. diff infection) when you take antibiotics. You can buy these without a prescription in the pharmacy section of the store.   If you were given a prescription for medicine here today, be sure to read all of the information (including the package insert) that comes with your prescription.  This will include important information about the medicine, its side effects, and any warnings that you need to know about.  The pharmacist who fills the prescription can provide more information and answer questions you may have about the medicine.  If you have questions or concerns that the pharmacist cannot address, please call or return to the Emergency Department.   Remember that you can always come back to the Emergency Department if you are not able to see your regular provider in the amount of time listed above, if you get any new symptoms, or if there is anything that worries you.    "

## 2018-11-10 NOTE — ED AVS SNAPSHOT
[facility] Emergency Department    [address]    [facility] [address]    Phone:  [phone]    Fax:  [phone]                                       Malgorzata Ramachandran   MRN: [id number]    Department:  [facility] Emergency Department   Date of Visit:  11/10/2018           Patient Information     Date Of Birth          1995        Your diagnoses for this visit were:     UTI (urinary tract infection), uncomplicated        You were seen by Matias Liu MD.        Discharge Instructions       Please make an appointment to follow up with your primary care provider in 3-5 days if not improving.      Discharge Instructions  Urinary Tract Infection  You or your child have been diagnosed with a urinary tract infection, or UTI. The urinary tract includes the kidneys (which make urine/pee), ureters (the tubes that carry urine/pee from the kidneys to the bladder), the bladder (which stores urine/pee), and urethra (the tube that carries urine/pee out of the bladder). Urinary tract infections occur when bacteria travel up the urethra into the bladder (bladder infection) and, in some cases, from there into the kidneys (kidney infection).  Generally, every Emergency Department visit should have a follow-up clinic visit with either a primary or a specialty clinic/provider. Please follow-up as instructed by your emergency provider today.  Return to the Emergency Department if:    You or your child have severe back pain.    You or your child are vomiting (throwing up) so that you cannot take your medicine.    You or your child have a new fever (had not previously had a fever) over 101 F.    You or your child have confusion or are very weak, or feel very ill.    Your child seems much more ill, will not wake up, will not respond right, or is crying for a long time and will not calm down.    You or your child are showing signs of dehydration. These signs may include  decreased urination (pee), dry mouth/gums/tongue, or decreased activity.    Follow-up with your provider:     Children under 24 months need to be seen by their regular provider within one week after a diagnosis of a UTI. It may be necessary to do some more tests to look at the child s kidney or bladder.    You should begin to feel better within 24 - 48 hours of starting your antibiotic; follow-up with your regular clinic/doctor/provider if this is not the case.    Treatment:     You will be treated with an antibiotic to kill the bacteria. We have to make an educated guess, based on what we know about common bacteria and antibiotics, as to which antibiotic will work for your infection. We will be correct most times but there will be some cases where the antibiotic chosen is not correct (see urine cultures below).    Take a pain medication such as acetaminophen (Tylenol ) or ibuprofen (Advil , Motrin , Nuprin ).    Phenazopyridine (Pyridium , Uristat ) is a prescription medication that numbs the bladder to reduce the burning pain of some UTIs.  The same medication is available in a non-prescription version (Azo-Standard , Urodol ). This medication will change the color of the urine and tears (usually blue or orange). If you wear contacts, do not wear them while taking this medication as they may be stained by the medication.    Urine Cultures:    If indicated, a urine culture may have been performed today. This test generally takes 24-48 hours to complete so the results are not known at this time. The results can confirm that an infection is present but also determine which antibiotic is effective for the specific bacteria that is causing the infection. If your urine culture shows that the antibiotic you were given today will not work to treat your infection, we will attempt to contact you to make arrangements to change the antibiotic. If the culture confirms that the antibiotic is effective for your infection, you  "will not be contacted. We often recommend follow-up with your regular physician/provider on the culture results regardless of this process.    Antibiotic Warning:     If you have been placed on antibiotics - watch for signs of allergic reaction.  These include rash, lip swelling, difficulty breathing, wheezing, and dizziness.  If you develop any of these symptoms, stop the antibiotic immediately and go to an emergency room or urgent care for evaluation.    Probiotics: If you have been given an antibiotic, you may want to also take a probiotic pill or eat yogurt with live cultures. Probiotics have \"good bacteria\" to help your intestines stay healthy. Studies have shown that probiotics help prevent diarrhea and other intestine problems (including C. diff infection) when you take antibiotics. You can buy these without a prescription in the pharmacy section of the store.   If you were given a prescription for medicine here today, be sure to read all of the information (including the package insert) that comes with your prescription.  This will include important information about the medicine, its side effects, and any warnings that you need to know about.  The pharmacist who fills the prescription can provide more information and answer questions you may have about the medicine.  If you have questions or concerns that the pharmacist cannot address, please call or return to the Emergency Department.   Remember that you can always come back to the Emergency Department if you are not able to see your regular provider in the amount of time listed above, if you get any new symptoms, or if there is anything that worries you.      24 Hour Appointment Hotline       To make an appointment at any [facility], call [phone] ([phone]). If you don't have a family doctor or clinic, we will help you find one. [facility] are conveniently located to serve the needs of you and your family.             Review of " your medicines      START taking        Dose / Directions Last dose taken    nitroFURantoin (macrocrystal-monohydrate) 100 MG capsule   Commonly known as:  MACROBID   Dose:  100 mg   Quantity:  10 capsule        Take 1 capsule (100 mg) by mouth 2 times daily for 5 days   Refills:  0        phenazopyridine 200 MG tablet   Commonly known as:  PYRIDIUM   Dose:  200 mg   Quantity:  9 tablet        Take 1 tablet (200 mg) by mouth 3 times daily as needed for irritation   Refills:  0          Our records show that you are taking the medicines listed below. If these are incorrect, please call your family doctor or clinic.        Dose / Directions Last dose taken    ibuprofen 200 MG tablet   Commonly known as:  ADVIL/MOTRIN   Dose:  600 mg   Quantity:  60 tablet        Take 3 tablets (600 mg) by mouth every 6 hours as needed for mild pain or fever   Refills:  0        penicillin V potassium 500 MG tablet   Commonly known as:  VEETID   Dose:  500 mg   Quantity:  20 tablet        Take 1 tablet (500 mg) by mouth 2 times daily   Refills:  0                Prescriptions were sent or printed at these locations (2 Prescriptions)                   Other Prescriptions                Printed at Department/Unit printer (2 of 2)         nitroFURantoin, macrocrystal-monohydrate, (MACROBID) 100 MG capsule               phenazopyridine (PYRIDIUM) 200 MG tablet                Procedures and tests performed during your visit     HCG qualitative urine    UA with Microscopic      Orders Needing Specimen Collection     None      Pending Results     No orders found from 11/8/2018 to 11/11/2018.            Pending Culture Results     No orders found from 11/8/2018 to 11/11/2018.            Pending Results Instructions     If you had any lab results that were not finalized at the time of your Discharge, you can call the ED Lab Result RN at [phone]. You will be contacted by this team for any positive Lab results or changes in treatment. The  nurses are available 7 days a week from 10A to 6:30P.  You can leave a message 24 hours per day and they will return your call.        Test Results From Your Hospital Stay        11/10/2018  8:09 AM      Component Results     Component Value Ref Range & Units Status    Color Urine Yellow  Final    Appearance Urine Cloudy  Final    Glucose Urine Negative NEG^Negative mg/dL Final    Bilirubin Urine Negative NEG^Negative Final    Ketones Urine Negative NEG^Negative mg/dL Final    Specific Gravity Urine 1.018 1.003 - 1.035 Final    Blood Urine Large (A) NEG^Negative Final    pH Urine 5.0 5.0 - 7.0 pH Final    Protein Albumin Urine 100 (A) NEG^Negative mg/dL Final    Urobilinogen mg/dL 0.0 0.0 - 2.0 mg/dL Final    Nitrite Urine Negative NEG^Negative Final    Leukocyte Esterase Urine Large (A) NEG^Negative Final    Source Midstream Urine  Final    WBC Urine >182 (H) 0 - 5 /HPF Final    RBC Urine >182 (H) 0 - 2 /HPF Final    Bacteria Urine Few (A) NEG^Negative /HPF Final         11/10/2018  9:08 AM      Component Results     Component Value Ref Range & Units Status    HCG Qual Urine Negative NEG^Negative Final    This test is for screening purposes.  Results should be interpreted along with   the clinical picture.  Confirmation testing is available if warranted by   ordering [id number], HCG Quantitative Pregnancy.                  Clinical Quality Measure: Blood Pressure Screening     Your blood pressure was checked while you were in the emergency department today. The last reading we obtained was  BP: 129/80 . Please read the guidelines below about what these numbers mean and what you should do about them.  If your systolic blood pressure (the top number) is less than 120 and your diastolic blood pressure (the bottom number) is less than 80, then your blood pressure is normal. There is nothing more that you need to do about it.  If your systolic blood pressure (the top number) is 120-139 or your diastolic blood pressure (the  bottom number) is 80-89, your blood pressure may be higher than it should be. You should have your blood pressure rechecked within a year by a primary care provider.  If your systolic blood pressure (the top number) is 140 or greater or your diastolic blood pressure (the bottom number) is 90 or greater, you may have high blood pressure. High blood pressure is treatable, but if left untreated over time it can put you at risk for heart attack, stroke, or kidney failure. You should have your blood pressure rechecked by a primary care provider within the next 4 weeks.  If your provider in the emergency department today gave you specific instructions to follow-up with your doctor or provider even sooner than that, you should follow that instruction and not wait for up to 4 weeks for your follow-up visit.        Thank you for choosing [facility]       Thank you for choosing [facility] for your care. Our goal is always to provide you with excellent care. Hearing back from our patients is one way we can continue to improve our services. Please take a few minutes to complete the written survey that you may receive in the mail after you visit with us. Thank you!        [employer] Information     [employer] gives you secure access to your electronic health record. If you see a primary care provider, you can also send messages to your care team and make appointments. If you have questions, please call your primary care clinic.  If you do not have a primary care provider, please call [phone] and they will assist you.        Care EveryWhere ID     This is your Care EveryWhere ID. This could be used by other organizations to access your [facility] medical records  [id number]        Equal Access to Services     BRITTA YOST : Hadii michael Weeks, wajuanda brittany, qaybta kagordon ames. So [facility] [phone].    ATENCIÓN: Si habla español, tiene a luna disposición servicios gratuitos  de asistencia lingüística. Ilene henry [phone].    We comply with applicable federal civil rights laws and [place] laws. We do not discriminate on the basis of race, color, national origin, age, disability, sex, sexual orientation, or gender identity.            After Visit Summary       This is your record. Keep this with you and show to your community pharmacist(s) and doctor(s) at your next visit.

## 2018-11-10 NOTE — ED PROVIDER NOTES
"  History     Chief Complaint:  Urinary Frequency    HPI   Malgorzata Ramachandran is a 23 year old female with no pertinent medical history who presents with urinary frequency. The patient reports that last night at 11:00 pm she started having pelvic discomfort she describes as pressure that has been accompanied by abdominal distension. She states this morning she also started having urinary urgency, frequency and hematuria, prompting her presentation to the ED. Her pain is constant and aggravated with urination. She has not taken anything for the pain. She also complains of having chills, but denies having any fevers, nausea, vomiting, or vaginal discharge. Of note, her last menstrual period was 2 weeks ago.    Allergies:  No known drug allergies    Medications:    penicillin V potassium (VEETID) 500 MG tablet     Past Medical History:    Melanocytic nevus    Past Surgical History:    Biopsy of skin lesion   section x2    Family History:    History reviewed. No pertinent family history.     Social History:  The patient presents to the ED alone.  Marital status:   Smoking status: Never Smoker  Alcohol use: No    Review of Systems   Constitutional: Positive for chills. Negative for fever.   Gastrointestinal: Positive for abdominal distention. Negative for nausea and vomiting.   Genitourinary: Positive for pelvic pain (pressure). Negative for frequency, hematuria, urgency and vaginal discharge.   All other systems reviewed and are negative.    Physical Exam     Patient Vitals for the past 24 hrs:   BP Temp Temp src Heart Rate Resp SpO2 Height Weight   11/10/18 0713 129/80 98.7  F (37.1  C) Oral 103 20 100 % 1.575 m (5' 2\") 87.6 kg (193 lb 2 oz)       Physical Exam    Constitutional:  Pleasant, age appropriate.       Resting comfortably in the bed.  Eyes:    Conjunctiva normal  Neck:    Supple, no meningismus.     CV:     Regular rate and rhythm.      No murmurs, rubs or gallops.     No lower extremity " edema.  PULM:    Clear to auscultation bilateral.       No respiratory distress.      Good air exchange.  ABD:    Soft, non-distended.       Mild focal tenderness in the midline low abdomen.     Bowel sounds normal.     No pulsatile masses.       No rebound, guarding or rigidity.     No CVA tenderness.   MSK:     No gross deformity to all four extremities.   LYMPH:   No cervical lymphadenopathy.  NEURO:   Alert.  Good muscular tone, no atrophy.   Skin:    Warm, dry and intact.    Psych:    Mood is good and affect is appropriate.      Emergency Department Course     Laboratory:  UA: Blood--Large, Protein Albumin 100, Leukocyte Esterase--Large, WBC/HPF >182, RBC/HPF >182, Bacteria--Few, o/w Negative  HCG Qualitative Urine: Negative    Emergency Department Course:  Past medical records, nursing notes, and vitals reviewed.  0704: I performed an exam of the patient and obtained history, as documented above.   A urine sample was collected and sent for laboratory testing, findings above.    0913: I rechecked the patient. Explained findings to the patient.    Findings and plan explained to the patient. Patient discharged home with instructions regarding supportive care, medications, and reasons to return. The importance of close follow-up was reviewed.    Impression & Plan    Medical Decision Makin-year-old female presents the ED with symptoms classic for hemorrhagic cystitis.  Urinalysis is consistent with infection.  She has no clinical signs of pyelonephritis.  Patient will be discharged home on Pyridium and Macrobid.  Patient to return to the ED for any worsening symptoms.    Diagnosis:    ICD-10-CM   1. UTI (urinary tract infection), uncomplicated N39.0       Disposition:  discharged to home    Discharge Medications:  New Prescriptions    NITROFURANTOIN, MACROCRYSTAL-MONOHYDRATE, (MACROBID) 100 MG CAPSULE    Take 1 capsule (100 mg) by mouth 2 times daily for 5 days    PHENAZOPYRIDINE (PYRIDIUM) 200 MG TABLET     Take 1 tablet (200 mg) by mouth 3 times daily as needed for irritation         Anat Skaggs  11/10/2018   [facility] EMERGENCY DEPARTMENT  I, Anat Skaggs, am serving as a scribe at 7:04 AM on 11/10/2018 to document services personally performed by Matias Liu MD based on my observations and the provider's statements to me.        Matias Liu MD  11/10/18 0943

## 2019-02-14 ENCOUNTER — HOSPITAL ENCOUNTER (EMERGENCY)
Facility: CLINIC | Age: 24
Discharge: HOME | End: 2019-02-14
Attending: EMERGENCY MEDICINE | Admitting: EMERGENCY MEDICINE
Payer: OTHER MISCELLANEOUS

## 2019-02-14 VITALS
OXYGEN SATURATION: 100 % | WEIGHT: 190 LBS | TEMPERATURE: 98.1 F | DIASTOLIC BLOOD PRESSURE: 75 MMHG | SYSTOLIC BLOOD PRESSURE: 119 MMHG | BODY MASS INDEX: 34.75 KG/M2 | HEART RATE: 117 BPM | RESPIRATION RATE: 12 BRPM

## 2019-02-14 DIAGNOSIS — Z77.098 CHEMICAL EXPOSURE: ICD-10-CM

## 2019-02-14 PROCEDURE — 99281 EMR DPT VST MAYX REQ PHY/QHP: CPT | Mod: Z6 | Performed by: EMERGENCY MEDICINE

## 2019-02-14 PROCEDURE — 99281 EMR DPT VST MAYX REQ PHY/QHP: CPT

## 2019-02-14 SDOH — HEALTH STABILITY: MENTAL HEALTH: HOW OFTEN DO YOU HAVE A DRINK CONTAINING ALCOHOL?: NEVER

## 2019-02-14 ASSESSMENT — ENCOUNTER SYMPTOMS
ABDOMINAL PAIN: 0
FEVER: 0
SHORTNESS OF BREATH: 0

## 2019-02-14 NOTE — ED PROVIDER NOTES
"  History     Chief Complaint   Patient presents with     Chemical Exposure     Chemical exposure to outer lower right eye. \"Feels irritated and burning.\" Patients soaked face in water for a couple of minutes.     HPI  Malgorzata Ramachandran is a 24 year old female who presents to the ER with a chemical exposure to her face.  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  She states that cleaning substance splashed on her face.  She states she had her eyes reflexively right away, does not believe that she got anything in her eye.  She has no eye pain, has not noticed any blurry vision. The substance was [employer] Enzymatic Single Enzyme - Low Suds.  She denies any other symptoms including shortness of breath, pain elsewhere, vomiting.  She did go to the sink, close her eye, and rinse the area thoroughly prior to coming to the ED.  However, she states that the skin near her eye still feels irritated and like it is burning.    Past Medical History:   Diagnosis Date     Abnormal Pap smear        Past Surgical History:   Procedure Laterality Date     ANESTH,LOWER ARM SKIN SURG       BIOPSY OF SKIN LESION        SECTION  2014      SECTION N/A 3/30/2017    Procedure:  SECTION;  Surgeon: Teodora Dodson DO;  Location:  L+D     GYN SURGERY             Family History   Problem Relation Age of Onset     Family History Negative Mother        Social History     Tobacco Use     Smoking status: Never Smoker     Smokeless tobacco: Never Used   Substance Use Topics     Alcohol use: No     Alcohol/week: 0.0 oz     Frequency: Never         I have reviewed the Medications, Allergies, Past Medical and Surgical History, and Social History in the Epic system.    Review of Systems   Constitutional: Negative for fever.   Respiratory: Negative for shortness of breath.    Cardiovascular: Negative for chest pain.   Gastrointestinal: Negative for abdominal pain.   Skin: "        Burning pain over right side of face   All other systems reviewed and are negative.      Physical Exam   BP: 125/69  Pulse: 69  Temp: 98  F (36.7  C)  Resp: 16  Weight: 86.2 kg (190 lb)  SpO2: 100 %      Physical Exam   Constitutional: No distress.   HENT:   Head: Atraumatic.   Mouth/Throat: No oropharyngeal exudate.   Eyes: Conjunctivae and EOM are normal. Pupils are equal, round, and reactive to light. Right eye exhibits no discharge. Left eye exhibits no discharge. No scleral icterus.   Cardiovascular: Normal heart sounds and intact distal pulses.   Pulmonary/Chest: Breath sounds normal. No respiratory distress.   Abdominal: Soft. There is no tenderness.   Musculoskeletal: She exhibits no edema or tenderness.   Skin: Skin is warm. No rash noted. She is not diaphoretic.       ED Course        Procedures             Critical Care time:  none             Labs Ordered and Resulted from Time of ED Arrival Up to the Time of Departure from the ED - No data to display         Assessments & Plan (with Medical Decision Making)   Patient's eye was normal-appearing.  Visual acuity was normal.  She states she has no pain or discomfort in the eye, did not believe any chemical gotten I.  I did speak with the poison center, who states that the chemical is a detergent, and is non-close it.  I did check pH in her eye, which was neutral.  Her face was irrigated and cleansed, she states this feeling improved.  Poison center reported there was nothing else to do other than copiously with the area.  Patient is requesting discharge at this time, does not feel she needs any further irrigation.  She is encouraged to follow-up as needed, return with any concerns.    I have reviewed the nursing notes.    I have reviewed the findings, diagnosis, plan and need for follow up with the patient.       Medication List      There are no discharge medications for this visit.         Final diagnoses:   Chemical exposure       2/14/2019    [facility], [place], EMERGENCY DEPARTMENT     Arlene Dowling MD  02/14/19 0500

## 2019-02-14 NOTE — DISCHARGE INSTRUCTIONS
Continue to rinse/wash face as needed for irritation. Return with new or worsening symptoms or any concerns.

## 2019-02-14 NOTE — ED AVS SNAPSHOT
[facility], Emergency Department  [address]  [facility] [address]  Phone:  [phone]  Fax:  [phone]                                    Malgorzata Ramachandran   MRN: [id number]    Department:  [facility], Emergency Department   Date of Visit:  2/14/2019           After Visit Summary Signature Page    I have received my discharge instructions, and my questions have been answered. I have discussed any challenges I see with this plan with the nurse or doctor.    ..........................................................................................................................................  Patient/Patient Representative Signature      ..........................................................................................................................................  Patient Representative Print Name and Relationship to Patient    ..................................................               ................................................  Date                                   Time    ..........................................................................................................................................  Reviewed by Signature/Title    ...................................................              ..............................................  Date                                               Time          22EPIC Rev 08/18

## 2020-03-02 ENCOUNTER — HEALTH MAINTENANCE LETTER (OUTPATIENT)
Age: 25
End: 2020-03-02

## 2020-04-30 ENCOUNTER — APPOINTMENT (OUTPATIENT)
Dept: CT IMAGING | Facility: CLINIC | Age: 25
End: 2020-04-30
Attending: EMERGENCY MEDICINE
Payer: OTHER GOVERNMENT

## 2020-04-30 ENCOUNTER — HOSPITAL ENCOUNTER (EMERGENCY)
Facility: CLINIC | Age: 25
Discharge: HOME | End: 2020-04-30
Attending: EMERGENCY MEDICINE | Admitting: EMERGENCY MEDICINE
Payer: OTHER GOVERNMENT

## 2020-04-30 VITALS
TEMPERATURE: 98.4 F | BODY MASS INDEX: 35.65 KG/M2 | RESPIRATION RATE: 20 BRPM | WEIGHT: 194.89 LBS | OXYGEN SATURATION: 97 % | HEART RATE: 68 BPM | DIASTOLIC BLOOD PRESSURE: 56 MMHG | SYSTOLIC BLOOD PRESSURE: 98 MMHG

## 2020-04-30 DIAGNOSIS — R91.1 LUNG NODULE: ICD-10-CM

## 2020-04-30 DIAGNOSIS — U07.1 COVID-19: ICD-10-CM

## 2020-04-30 DIAGNOSIS — R06.00 DYSPNEA, UNSPECIFIED TYPE: ICD-10-CM

## 2020-04-30 DIAGNOSIS — R07.9 CHEST PAIN, UNSPECIFIED TYPE: ICD-10-CM

## 2020-04-30 LAB
ALBUMIN SERPL-MCNC: 3.7 G/DL (ref 3.4–5)
ALP SERPL-CCNC: 77 U/L (ref 40–150)
ALT SERPL W P-5'-P-CCNC: 20 U/L (ref 0–50)
ANION GAP SERPL CALCULATED.3IONS-SCNC: 5 MMOL/L (ref 3–14)
AST SERPL W P-5'-P-CCNC: 13 U/L (ref 0–45)
B-HCG FREE SERPL-ACNC: <5 IU/L
BASOPHILS # BLD AUTO: 0 10E9/L (ref 0–0.2)
BASOPHILS NFR BLD AUTO: 0.2 %
BILIRUB SERPL-MCNC: 0.3 MG/DL (ref 0.2–1.3)
BUN SERPL-MCNC: 9 MG/DL (ref 7–30)
CALCIUM SERPL-MCNC: 8.4 MG/DL (ref 8.5–10.1)
CHLORIDE SERPL-SCNC: 107 MMOL/L (ref 94–109)
CO2 SERPL-SCNC: 24 MMOL/L (ref 20–32)
CREAT SERPL-MCNC: 0.61 MG/DL (ref 0.52–1.04)
D DIMER PPP FEU-MCNC: 1.1 UG/ML FEU (ref 0–0.5)
DIFFERENTIAL METHOD BLD: NORMAL
EOSINOPHIL # BLD AUTO: 0 10E9/L (ref 0–0.7)
EOSINOPHIL NFR BLD AUTO: 0.4 %
ERYTHROCYTE [DISTWIDTH] IN BLOOD BY AUTOMATED COUNT: 12.2 % (ref 10–15)
GFR SERPL CREATININE-BSD FRML MDRD: >90 ML/MIN/{1.73_M2}
GLUCOSE SERPL-MCNC: 140 MG/DL (ref 70–99)
HCT VFR BLD AUTO: 38.9 % (ref 35–47)
HGB BLD-MCNC: 13.1 G/DL (ref 11.7–15.7)
IMM GRANULOCYTES # BLD: 0 10E9/L (ref 0–0.4)
IMM GRANULOCYTES NFR BLD: 0.2 %
LYMPHOCYTES # BLD AUTO: 1.1 10E9/L (ref 0.8–5.3)
LYMPHOCYTES NFR BLD AUTO: 21.5 %
MCH RBC QN AUTO: 30.5 PG (ref 26.5–33)
MCHC RBC AUTO-ENTMCNC: 33.7 G/DL (ref 31.5–36.5)
MCV RBC AUTO: 91 FL (ref 78–100)
MONOCYTES # BLD AUTO: 0.4 10E9/L (ref 0–1.3)
MONOCYTES NFR BLD AUTO: 8.4 %
NEUTROPHILS # BLD AUTO: 3.6 10E9/L (ref 1.6–8.3)
NEUTROPHILS NFR BLD AUTO: 69.3 %
NRBC # BLD AUTO: 0 10*3/UL
NRBC BLD AUTO-RTO: 0 /100
PLATELET # BLD AUTO: 203 10E9/L (ref 150–450)
POTASSIUM SERPL-SCNC: 3.2 MMOL/L (ref 3.4–5.3)
PROT SERPL-MCNC: 8.1 G/DL (ref 6.8–8.8)
RBC # BLD AUTO: 4.3 10E12/L (ref 3.8–5.2)
SODIUM SERPL-SCNC: 136 MMOL/L (ref 133–144)
TROPONIN I SERPL-MCNC: <0.015 UG/L (ref 0–0.04)
WBC # BLD AUTO: 5.2 10E9/L (ref 4–11)

## 2020-04-30 PROCEDURE — 71275 CT ANGIOGRAPHY CHEST: CPT

## 2020-04-30 PROCEDURE — 25000128 H RX IP 250 OP 636: Performed by: EMERGENCY MEDICINE

## 2020-04-30 PROCEDURE — 93005 ELECTROCARDIOGRAM TRACING: CPT

## 2020-04-30 PROCEDURE — 80053 COMPREHEN METABOLIC PANEL: CPT | Performed by: EMERGENCY MEDICINE

## 2020-04-30 PROCEDURE — 99285 EMERGENCY DEPT VISIT HI MDM: CPT | Mod: 25

## 2020-04-30 PROCEDURE — 85379 FIBRIN DEGRADATION QUANT: CPT | Performed by: EMERGENCY MEDICINE

## 2020-04-30 PROCEDURE — 84484 ASSAY OF TROPONIN QUANT: CPT | Performed by: EMERGENCY MEDICINE

## 2020-04-30 PROCEDURE — 84702 CHORIONIC GONADOTROPIN TEST: CPT | Mod: GZ

## 2020-04-30 PROCEDURE — 85025 COMPLETE CBC W/AUTO DIFF WBC: CPT | Performed by: EMERGENCY MEDICINE

## 2020-04-30 RX ORDER — IOPAMIDOL 755 MG/ML
77 INJECTION, SOLUTION INTRAVASCULAR ONCE
Status: COMPLETED | OUTPATIENT
Start: 2020-04-30 | End: 2020-04-30

## 2020-04-30 RX ADMIN — IOPAMIDOL 77 ML: 755 INJECTION, SOLUTION INTRAVENOUS at 17:44

## 2020-04-30 ASSESSMENT — ENCOUNTER SYMPTOMS
SHORTNESS OF BREATH: 1
VOMITING: 0

## 2020-04-30 NOTE — ED TRIAGE NOTES
Patient is here for shortness of breath and chest tightness, and lightheadedness.  She states her symptoms worsened suddenly at 1300 today.  She tested positive for COVID-19 yesterday and reports not feeling well since March 27.  ABCs intact.  Patient is alert and oriented x3.

## 2020-04-30 NOTE — ED PROVIDER NOTES
History   Chief Complaint:  Shortness of Breath    HPI   Malgorzata Ramachandran is a 25 year old female, who tested positive for COVID-19 yesterday, who presents for evaluation of chest tightness.  Patient states she became sick on .  She was experienced intermittent fevers, headaches and cough.  Ultimately she was seen at outside ED yesterday.  She tested positive for COVID-19.  Basic laboratory studies and chest x-ray were reassuring and she was discharged home with quarantine/isolation precautions.  She felt that overall symptoms were improving and is not having any persistent cough or fever.  Today at 1330 after taking Tylenol, she had the abrupt onset of diffuse chest tightness.  Pain has been constant since it began with no radiation or obvious alleviating or aggravating factors.  She became short of breath with the tightness.  No nausea or vomiting.  She denies any history of DVT, PE, unilateral leg swelling, recent immobilization, hemoptysis, estrogen use or malignancy.  She has no other complaints or concerns.    XR Portable Chest 1 View 2020  FINDINGS:  One view was obtained.  The lungs are clear.  Heart size and pulmonary vascularity are within normal limits.  There is no evidence of pneumothorax or pleural effusion.  No acute infiltrates are identified.    Laboratory Results 2020  CBC: AWNL (WBC 4.6, HGB 13.2, )   BMP: Glucose 119 (H) o/w WNL (Creatinine 0.66)   Procalcitonin: <0.05  COVID-19: Positive.     Allergies:  No Known Drug Allergies     Medications:   Mobic   Nexplanon    Past Medical History:    Melanocytic nevus     Past Surgical History:    Lower arm skin surgery, left  Biopsy of skin lesion   section x 3      Family History:    Mother - Gestational diabetes     Social History:  The patient was unaccompanied to the ED.  Smoking Status: Never Smoker  Smokeless Tobacco: Never Used  Alcohol Use: No  Drug Use: No    Review of Systems   Respiratory: Positive for  shortness of breath.    Cardiovascular: Positive for chest pain. Negative for leg swelling.   Gastrointestinal: Negative for vomiting.   Skin: Negative for rash.   All other systems reviewed and are negative.        Physical Exam     Patient Vitals for the past 24 hrs:   BP Temp Temp src Pulse Heart Rate Resp SpO2 Weight   04/30/20 1835 -- -- -- -- 71 22 98 % --   04/30/20 1834 -- -- -- -- 71 11 99 % --   04/30/20 1833 -- -- -- -- 73 16 97 % --   04/30/20 1832 -- -- -- -- 71 15 97 % --   04/30/20 1831 -- -- -- -- 64 13 95 % --   04/30/20 1830 97/56 -- -- 72 -- -- -- --   04/30/20 1829 -- -- -- -- 75 17 95 % --   04/30/20 1828 -- -- -- -- 88 15 99 % --   04/30/20 1827 -- -- -- -- 76 28 96 % --   04/30/20 1826 -- -- -- -- 78 9 96 % --   04/30/20 1825 -- -- -- -- 75 14 97 % --   04/30/20 1824 -- -- -- -- 73 9 96 % --   04/30/20 1819 -- -- -- -- 73 10 98 % --   04/30/20 1818 -- -- -- -- 75 23 98 % --   04/30/20 1813 -- -- -- -- 74 9 99 % --   04/30/20 1812 -- -- -- -- 82 13 94 % --   04/30/20 1811 -- -- -- -- 82 17 97 % --   04/30/20 1810 -- -- -- -- 92 19 98 % --   04/30/20 1808 -- -- -- -- 84 13 90 % --   04/30/20 1807 -- -- -- -- 76 12 98 % --   04/30/20 1806 -- -- -- -- 74 25 97 % --   04/30/20 1802 -- -- -- -- 72 12 98 % --   04/30/20 1801 -- -- -- -- 71 21 98 % --   04/30/20 1800 -- -- -- -- 74 9 94 % --   04/30/20 1759 106/58 -- -- 74 76 -- -- --   04/30/20 1758 106/58 -- -- 74 -- -- -- --   04/30/20 1739 -- -- -- -- -- 11 -- --   04/30/20 1738 -- -- -- -- 73 8 -- --   04/30/20 1737 -- -- -- -- 74 22 -- --   04/30/20 1735 -- -- -- -- 71 11 -- --   04/30/20 1734 -- -- -- -- 68 13 -- --   04/30/20 1733 -- -- -- -- 71 19 -- --   04/30/20 1732 -- -- -- -- 64 19 -- --   04/30/20 1731 -- -- -- -- 61 13 -- --   04/30/20 1730 -- -- -- 67 67 15 -- --   04/30/20 1729 -- -- -- -- 66 14 -- --   04/30/20 1728 -- -- -- -- 73 17 -- --   04/30/20 1727 -- -- -- -- 72 20 -- --   04/30/20 1726 -- -- -- -- 70 15 96 % --   04/30/20  1725 -- -- -- -- 78 26 95 % --   04/30/20 1724 -- -- -- -- -- 26 96 % --   04/30/20 1723 -- -- -- -- -- 27 97 % --   04/30/20 1722 -- -- -- -- -- 21 98 % --   04/30/20 1721 -- -- -- -- 80 20 97 % --   04/30/20 1720 -- -- -- -- 81 23 97 % --   04/30/20 1719 -- -- -- -- 71 17 97 % --   04/30/20 1717 -- -- -- -- 93 18 -- --   04/30/20 1716 -- -- -- -- 96 25 -- --   04/30/20 1715 -- -- -- 84 94 28 91 % --   04/30/20 1713 -- -- -- -- 78 16 96 % --   04/30/20 1712 -- -- -- -- 77 17 98 % --   04/30/20 1711 -- -- -- -- 92 11 96 % --   04/30/20 1710 -- -- -- -- 87 14 96 % --   04/30/20 1709 -- -- -- -- 68 13 95 % --   04/30/20 1708 -- -- -- -- 71 13 96 % --   04/30/20 1707 -- -- -- -- 71 14 96 % --   04/30/20 1706 -- -- -- -- 71 10 97 % --   04/30/20 1705 -- -- -- -- 70 13 96 % --   04/30/20 1704 -- -- -- -- 67 13 96 % --   04/30/20 1703 -- -- -- -- 69 9 97 % --   04/30/20 1702 -- -- -- -- 67 9 96 % --   04/30/20 1701 -- -- -- -- 67 12 95 % --   04/30/20 1700 (!) 145/75 -- -- 66 67 14 97 % --   04/30/20 1659 -- -- -- -- 69 14 97 % --   04/30/20 1658 -- -- -- -- 67 14 97 % --   04/30/20 1657 -- -- -- -- 70 13 96 % --   04/30/20 1656 -- -- -- -- 67 13 97 % --   04/30/20 1655 -- -- -- -- 71 13 96 % --   04/30/20 1654 -- -- -- -- 69 13 97 % --   04/30/20 1653 -- -- -- -- 74 12 96 % --   04/30/20 1652 -- -- -- -- 68 13 97 % --   04/30/20 1651 -- -- -- -- 74 14 96 % --   04/30/20 1650 -- -- -- -- 68 14 96 % --   04/30/20 1649 -- -- -- -- 75 10 97 % --   04/30/20 1648 -- -- -- -- -- 15 98 % --   04/30/20 1647 -- -- -- -- 65 19 98 % --   04/30/20 1646 -- -- -- -- 61 11 97 % --   04/30/20 1645 123/75 -- -- 62 71 12 97 % --   04/30/20 1644 -- -- -- -- 63 13 97 % --   04/30/20 1643 -- -- -- -- 70 8 97 % --   04/30/20 1642 -- -- -- -- 58 15 97 % --   04/30/20 1641 -- -- -- -- 70 17 97 % --   04/30/20 1640 -- -- -- -- 63 19 98 % --   04/30/20 1635 -- -- -- -- -- -- 97 % --   04/30/20 1633 -- -- -- -- 70 14 97 % --   04/30/20 1631 -- --  -- -- 71 13 96 % --   04/30/20 1630 123/66 -- -- 68 72 -- 97 % --   04/30/20 1628 -- -- -- -- 73 10 97 % --   04/30/20 1626 -- -- -- -- 69 12 97 % --   04/30/20 1625 -- -- -- -- 71 9 97 % --   04/30/20 1624 -- -- -- -- 72 10 97 % --   04/30/20 1623 -- -- -- -- 72 12 97 % --   04/30/20 1622 -- -- -- -- 75 16 97 % --   04/30/20 1621 -- -- -- -- 74 17 98 % --   04/30/20 1620 -- -- -- -- 74 13 97 % --   04/30/20 1618 -- -- -- -- 72 17 97 % --   04/30/20 1617 -- -- -- -- 73 12 96 % --   04/30/20 1615 126/83 -- -- 80 85 12 97 % --   04/30/20 1614 -- -- -- -- 81 17 96 % --   04/30/20 1613 -- -- -- -- 78 14 96 % --   04/30/20 1612 -- -- -- -- -- 13 -- --   04/30/20 1611 -- -- -- -- 78 13 97 % --   04/30/20 1610 122/43 -- -- 80 93 17 98 % --   04/30/20 1609 -- -- -- -- -- 22 96 % --   04/30/20 1608 -- -- -- -- -- -- 96 % --   04/30/20 1607 -- -- -- -- 77 15 96 % --   04/30/20 1605 -- -- -- -- 72 17 97 % --   04/30/20 1600 -- -- -- -- 96 17 97 % --   04/30/20 1555 -- -- -- -- 73 16 97 % --   04/30/20 1550 -- -- -- -- 72 12 98 % --   04/30/20 1545 125/75 -- -- 75 80 (!) 31 98 % --   04/30/20 1540 -- -- -- -- -- -- 99 % --   04/30/20 1535 -- -- -- -- -- -- 99 % --   04/30/20 1530 -- -- -- -- -- -- 100 % --   04/30/20 1500 131/77 98.4  F (36.9  C) Temporal 73 -- 24 100 % 88.4 kg (194 lb 14.2 oz)     Physical Exam    General:   Pleasant, age appropriate.  Eyes:    Conjunctiva normal  Neck:    Supple, no meningismus.     CV:     No JVD or unilateral leg swelling.       2+ radial pulses bilateral.       No lower extremity edema.  PULM:    No accessory use     No respiratory distress.      Good air exchange.     No stridor.  ABD:    Soft, non-tender, non-distended.       No pulsatile masses.       No rebound, guarding or rigidity.  MSK:     No gross deformity to all four extremities.   LYMPH:   No cervical lymphadenopathy.  NEURO:   Alert. Good muscle tone, no atrophy.  Skin:    Warm, dry and intact.    Psych:    Mood is good and  affect is appropriate.      Emergency Department Course     ECG:  Agree with EKG; read at 1553  No significant change when compared to 12/11/16  Sinus rhythm  Normal ECG  Vent. rate 71 BPM MO interval 158 ms QRS duration 94 ms QT/QTc 410/445 ms P-R-T axes 51 60 31.    Imaging:  Radiology findings were communicated with the patient who voiced understanding of the findings.    CT Chest PE:  1. No pulmonary embolism.   2. 9 mm posterior subpleural nodule superior segment left lower lobe with slight cavitation. This has an inflammatory appearance and consider inflammatory cavitary nodule.    Reading per radiology    Laboratory:  Laboratory findings were communicated with the patient who voiced understanding of the findings.    CBC: WBC 5.2, HGB 13.1,   CMP: Potassium 3.2(L). Glucose 140(H) o/w WNL (Creatinine 0.61)  D Dimer: 1.1(H)  Troponin (Collected 1621): <0.015  ISTAT HCG quantitative pregnancy POCT: <5.0    Procedures    Interventions:    Emergency Department Course:   Nursing notes and vitals reviewed.    1520 I performed an exam of the patient as documented above.     1531 IV was inserted and blood was drawn for laboratory testing, results above. EKG was performed, see results above.     1742 The patient was sent for a Chest CT PE while in the emergency department, results above.      1841 I personally reviewed the results with the patient and answered all related questions prior to discharge.    Impression & Plan      Medical Decision Making:  Malgorzata Ramachandran is a 25 year old female who presents to the emergency department today for evaluation of chest pain and shortness of breath with recent positive COVID-19 test yesterday.  From a respiratory standpoint, she looks well.  No hypoxia or respiratory distress.  Basic laboratory studies are reassuring with no suggestion of severe COVID-19 infection.  She was evaluated for alternative pathology particularly pulmonary embolus given the predisposition  to thrombosis with COVID-19.  EKG revealed no ischemic changes nor dysrhythmia.  Troponin within normal limits.  Very low suspicion for ACS.  No indication for serial enzymes or provocative testing.  D-dimer elevated but uncertain whether related to COVID-19 infection vs PE.  CT scan undertaken which revealed no pulmonary embolus, aortic dissection, aortic aneurysm or severe inflammatory change in the lung.  There is a solitary inflammatory lung nodule.  Patient to follow-up with PCP regarding this finding.  Patient safe for discharge home.      Covid-19  Malgorzata Ramachandran was evaluated during a global COVID-19 pandemic, which necessitated consideration that the patient might be at risk for infection with the SARS-CoV-2 virus that causes COVID-19.   Applicable protocols for evaluation were followed during the patient's care.   COVID-19 was considered as part of the patient's evaluation. The plan for testing is: a test was obtained at a previous visit and reviewed & considered today.      Diagnosis:    ICD-10-CM    1. Chest pain, unspecified type  R07.9    2. Dyspnea, unspecified type  R06.00    3. COVID-19  U07.1    4. Lung nodule  R91.1        Disposition:   The patient is discharged to home.     Scribe Disclosure:  IJsoe, am serving as a scribe at 3:20 PM on 4/30/2020 to document services personally performed by Matias Liu MD based on my observations and the provider's statements to me.  April 30, 2020     Scribe Disclosure:  ILinsey, am serving as a scribe at 1520 on 4/30/2020 to document services personally performed by Matias Liu MD based on my observations and the provider's statements to me.  [facility] EMERGENCY DEPARTMENT        Matias Liu MD  04/30/20 2000

## 2020-04-30 NOTE — ED AVS SNAPSHOT
[facility] Emergency Department  [address]  [facility] [address]  Phone:  [phone]  Fax:  [phone]                                    Malgorzata Ramachandran   MRN: [id number]    Department:  [facility] Emergency Department   Date of Visit:  4/30/2020           After Visit Summary Signature Page    I have received my discharge instructions, and my questions have been answered. I have discussed any challenges I see with this plan with the nurse or doctor.    ..........................................................................................................................................  Patient/Patient Representative Signature      ..........................................................................................................................................  Patient Representative Print Name and Relationship to Patient    ..................................................               ................................................  Date                                   Time    ..........................................................................................................................................  Reviewed by Signature/Title    ...................................................              ..............................................  Date                                               Time          22EPIC Rev 08/18

## 2020-04-30 NOTE — DISCHARGE INSTRUCTIONS
Please make an appointment to follow up with your primary care provider in 3-5 days even if entirely better.    A lung nodule was seen today.  Please have this followed up by your primary care doctor.      Discharge Instructions  Chest Pain    You have been seen today for chest pain or discomfort.  At this time, your provider has found no signs that your chest pain is due to a serious or life-threatening condition, (or you have declined more testing and/or admission to the hospital). However, sometimes there is a serious problem that does not show up right away. Your evaluation today may not be complete and you may need further testing and evaluation.     Generally, every Emergency Department visit should have a follow-up clinic visit with either a primary or a specialty clinic/provider. Please follow-up as instructed by your emergency provider today.  Return to the Emergency Department if:  Your chest pain changes, gets worse, starts to happen more often, or comes with less activity.  You are newly short of breath.  You get very weak or tired.  You pass out or faint.  You have any new symptoms, like fever, cough, numb legs, or you cough up blood.  You have anything else that worries you.    Until you follow-up with your regular provider, please do the following:  Take one aspirin daily unless you have an allergy or are told not to by your provider.  If a stress test appointment has been made, go to the appointment.  If you have questions, contact your regular provider.  Follow-up with your regular provider/clinic as directed; this is very important.    If you were given a prescription for medicine here today, be sure to read all of the information (including the package insert) that comes with your prescription.  This will include important information about the medicine, its side effects, and any warnings that you need to know about.  The pharmacist who fills the prescription can provide more information and  answer questions you may have about the medicine.  If you have questions or concerns that the pharmacist cannot address, please call or return to the Emergency Department.       Remember that you can always come back to the Emergency Department if you are not able to see your regular provider in the amount of time listed above, if you get any new symptoms, or if there is anything that worries you.  Discharge Instructions  COVID-19    Your Provider has determined that you should practice self-isolation and self-monitoring in order to protect yourself and your community from COVID-19, which is the disease caused by a new coronavirus. The virus spreads from person to person primarily by droplets when an infected person coughs or sneezes and the droplet either lands on another person or that other person touches a surface with the droplet on it. Diagnosis of COVID-19 can be made with a test but many times the test is unavailable or not necessary. There is no specific treatment or medicine for the disease.    Symptoms of COVID-19  Many people have no symptoms or mild symptoms.  Symptoms may usually appear 4 to 5 days (up to 14 days) after contact with another ill person. Some people will get severe symptoms and pneumonia. Usual symptoms are:     Fever    Cough   Trouble breathing   Less common symptoms are: Headache, body aches, sore    throat, sneezing, diarrhea.    How to Care for Yourself    Stay home.  Most people will recover from illness with mild symptoms.  Isolation by staying home is the best method to prevent the spread of the illness. Do not go to work or school. Have a friend or relative do your shopping. Do not use public transportation (bus, train) or ridesharing (Lyft, Uber).    How long should I stay home?  If you have symptoms of a respiratory disease (fever, cough), you should stay home for at least 7 days, and for 3 days with no fever and improvement of respiratory symptoms--whichever is longer. (Your  fever should be gone for 3 days without using fever-reducing medicine.)    For example, if you have a fever and coughing for 4 days, you need to stay home 3 more days with no fever for a total of 7 days. Or, if you have a fever and coughing for 5 days, you need to stay home 3 more days with no fever for a total of 8 days.    Separate yourself from other people in your home.?As much as possible, you should stay in one room and away from other people in your home. Also, use a separate bathroom, if possible. Avoid handling pets or other animals while sick.     Wear a facemask if you need to be around other people and cover your mouth and nose with a tissue when you cough or sneeze.     Avoid sharing personal household items. You should not share dishes, drinking glasses, forks/knives/spoons, towels, or bedding with other people in your home. After using these items, they should be washed with soap and water. Clean parts of your home that are touched often (doorknobs, faucets, countertops, etc.) daily.     Wash your hands often with soap and water for at least 20 seconds or use an alcohol-based hand  containing at least 60% alcohol.     Avoid touching your face.    Treat your symptoms: Take Acetaminophen (Tylenol) to treat body aches and fever as needed for comfort. Ibuprofen (Advil or Motrin) can be used as well if you still have symptoms after taking Tylenol.  Drink fluids. Rest.    Watch for worsening symptoms, shortness of breath, or difficulty   breathing.    Return to the Emergency Department if:    If you are developing worsening breathing, shortness of breath, or feel worse you should seek medical attention.  If you are uncertain, contact your health care provider/clinic. If you need emergency medical attention, call 911 and tell them you have been ill.

## 2020-05-01 LAB — INTERPRETATION ECG - MUSE: NORMAL

## 2021-02-14 ENCOUNTER — APPOINTMENT (OUTPATIENT)
Dept: ULTRASOUND IMAGING | Facility: CLINIC | Age: 26
End: 2021-02-14
Attending: NURSE PRACTITIONER

## 2021-02-14 ENCOUNTER — HOSPITAL ENCOUNTER (EMERGENCY)
Facility: CLINIC | Age: 26
Discharge: HOME | End: 2021-02-14
Attending: NURSE PRACTITIONER | Admitting: NURSE PRACTITIONER

## 2021-02-14 ENCOUNTER — NURSE TRIAGE (OUTPATIENT)
Dept: NURSING | Facility: CLINIC | Age: 26
End: 2021-02-14

## 2021-02-14 VITALS
SYSTOLIC BLOOD PRESSURE: 108 MMHG | TEMPERATURE: 97.5 F | RESPIRATION RATE: 18 BRPM | DIASTOLIC BLOOD PRESSURE: 57 MMHG | OXYGEN SATURATION: 99 % | HEART RATE: 83 BPM

## 2021-02-14 DIAGNOSIS — B96.89 BACTERIAL VAGINITIS: ICD-10-CM

## 2021-02-14 DIAGNOSIS — N76.0 BACTERIAL VAGINITIS: ICD-10-CM

## 2021-02-14 DIAGNOSIS — N93.9 VAGINAL BLEEDING: ICD-10-CM

## 2021-02-14 LAB
ABO + RH BLD: NORMAL
ABO + RH BLD: NORMAL
ALBUMIN UR-MCNC: NEGATIVE MG/DL
ANION GAP SERPL CALCULATED.3IONS-SCNC: 5 MMOL/L (ref 3–14)
APPEARANCE UR: CLEAR
B-HCG SERPL-ACNC: ABNORMAL IU/L (ref 0–5)
BILIRUB UR QL STRIP: NEGATIVE
BUN SERPL-MCNC: 10 MG/DL (ref 7–30)
CALCIUM SERPL-MCNC: 8.7 MG/DL (ref 8.5–10.1)
CHLORIDE SERPL-SCNC: 107 MMOL/L (ref 94–109)
CO2 SERPL-SCNC: 26 MMOL/L (ref 20–32)
COLOR UR AUTO: ABNORMAL
CREAT SERPL-MCNC: 0.62 MG/DL (ref 0.52–1.04)
ERYTHROCYTE [DISTWIDTH] IN BLOOD BY AUTOMATED COUNT: 12.6 % (ref 10–15)
GFR SERPL CREATININE-BSD FRML MDRD: >90 ML/MIN/{1.73_M2}
GLUCOSE SERPL-MCNC: 100 MG/DL (ref 70–99)
GLUCOSE UR STRIP-MCNC: NEGATIVE MG/DL
HCT VFR BLD AUTO: 38.7 % (ref 35–47)
HGB BLD-MCNC: 12.8 G/DL (ref 11.7–15.7)
HGB UR QL STRIP: ABNORMAL
KETONES UR STRIP-MCNC: NEGATIVE MG/DL
LEUKOCYTE ESTERASE UR QL STRIP: NEGATIVE
MCH RBC QN AUTO: 30.5 PG (ref 26.5–33)
MCHC RBC AUTO-ENTMCNC: 33.1 G/DL (ref 31.5–36.5)
MCV RBC AUTO: 92 FL (ref 78–100)
MUCOUS THREADS #/AREA URNS LPF: PRESENT /LPF
NITRATE UR QL: NEGATIVE
PH UR STRIP: 6.5 PH (ref 5–7)
PLATELET # BLD AUTO: 239 10E9/L (ref 150–450)
POTASSIUM SERPL-SCNC: 3.5 MMOL/L (ref 3.4–5.3)
RBC # BLD AUTO: 4.19 10E12/L (ref 3.8–5.2)
RBC #/AREA URNS AUTO: 1 /HPF (ref 0–2)
SODIUM SERPL-SCNC: 138 MMOL/L (ref 133–144)
SOURCE: ABNORMAL
SP GR UR STRIP: 1.03 (ref 1–1.03)
SPECIMEN EXP DATE BLD: NORMAL
SPECIMEN SOURCE: ABNORMAL
SQUAMOUS #/AREA URNS AUTO: 2 /HPF (ref 0–1)
UROBILINOGEN UR STRIP-MCNC: NORMAL MG/DL (ref 0–2)
WBC # BLD AUTO: 8.8 10E9/L (ref 4–11)
WBC #/AREA URNS AUTO: 1 /HPF (ref 0–5)
WET PREP SPEC: ABNORMAL

## 2021-02-14 PROCEDURE — 87210 SMEAR WET MOUNT SALINE/INK: CPT | Performed by: NURSE PRACTITIONER

## 2021-02-14 PROCEDURE — 99284 EMERGENCY DEPT VISIT MOD MDM: CPT | Mod: 25

## 2021-02-14 PROCEDURE — 86901 BLOOD TYPING SEROLOGIC RH(D): CPT | Performed by: NURSE PRACTITIONER

## 2021-02-14 PROCEDURE — 85027 COMPLETE CBC AUTOMATED: CPT | Performed by: NURSE PRACTITIONER

## 2021-02-14 PROCEDURE — 80048 BASIC METABOLIC PNL TOTAL CA: CPT | Performed by: NURSE PRACTITIONER

## 2021-02-14 PROCEDURE — 84702 CHORIONIC GONADOTROPIN TEST: CPT | Performed by: NURSE PRACTITIONER

## 2021-02-14 PROCEDURE — 81001 URINALYSIS AUTO W/SCOPE: CPT | Performed by: NURSE PRACTITIONER

## 2021-02-14 PROCEDURE — 76801 OB US < 14 WKS SINGLE FETUS: CPT

## 2021-02-14 RX ORDER — METRONIDAZOLE 7.5 MG/G
GEL VAGINAL
Qty: 70 G | Refills: 0 | Status: SHIPPED | OUTPATIENT
Start: 2021-02-14 | End: 2021-02-25

## 2021-02-14 ASSESSMENT — ENCOUNTER SYMPTOMS
ABDOMINAL PAIN: 1
CHILLS: 0
VOMITING: 0
FEVER: 0
NAUSEA: 0
SHORTNESS OF BREATH: 0

## 2021-02-14 NOTE — ED PROVIDER NOTES
History   Chief Complaint:  Vaginal Bleeding and Vaginal Itching    HPI   Malgorzata Ramachandran, , is a 26 year old female, currently 7 weeks pregnant, who presents to the ED for evaluation of vaginal bleeding. The patient reports light vaginal spotting and some abdominal cramping over the past day. Also complains of a burning sensation during sexual intercourse. States she has a history of miscarriage. Denies nausea or vomiting. Denies smoking or drinking.     Review of Systems   Constitutional: Negative for chills and fever.   Respiratory: Negative for shortness of breath.    Cardiovascular: Negative for chest pain.   Gastrointestinal: Positive for abdominal pain. Negative for nausea and vomiting.   Genitourinary: Positive for vaginal bleeding and vaginal pain.   All other systems reviewed and are negative.    Allergies:  No Known Allergies    Medications:    The patient is not currently taking any prescribed medications.    Past Medical History:    Chorioamnionitis  Asthma  Melanocytic nevus    Past Surgical History:    Skin surgery - lower arm   section x3    Family History:    The patient denies past family history.     Social History:  Marital Status:   Employer: [employer]  Smoking status: No  Alcohol use: No  Presents to the ED with self    Physical Exam     Patient Vitals for the past 24 hrs:   BP Temp Pulse Resp SpO2   21 -- -- -- -- 98 %   21 123/63 -- 81 -- 100 %   21 112/60 -- 75 -- 100 %   21 1900 -- -- -- -- 100 %   21 1845 123/70 -- -- -- 97 %   21 1830 116/46 -- 67 -- 99 %   21 1815 120/68 -- 82 -- 100 %   21 1800 127/83 -- 85 -- --   21 1709 (!) 161/86 97.5  F (36.4  C) 108 18 100 %       Physical Exam  General: Alert, No obvious discomfort, well kept  Eyes: PERRL, conjunctivae pink no scleral icterus or conjunctival injection  ENT:   Moist mucus membranes, posterior oropharynx clear  without erythema or exudates, No lymphadenopathy, Normal voice  Resp:  Lungs clear to auscultation bilaterally, no crackles/rubs/wheezes. Good air movement  CV:  Normal rate and rhythm, no murmurs/rubs/gallops  GI:  Abdomen soft and non-distended.  Normoactive BS.  No tenderness, guarding or rebound, No masses  : Normal external genitalia.  Small amount of dark blood in the vault.  Small amount of thicker mucus.  Closed cervical os.  Nontender cervix.  Skin:  Warm, dry.  No rashes or petechiae  Musculoskeletal: No peripheral edema or calf tenderness, Normal gross ROM   Neuro: Alert and oriented to person/place/time, normal sensation  Psychiatric: mild anxious, cooperative, good eye contact    Emergency Department Course   Imaging:  Radiology findings were communicated with the patient who voiced understanding of the findings.    US OB 1st Trimester with transvaginal:  1.  Single living intrauterine gestation at 6 weeks 1 day, EDC 10/09/2021.     Reading per radiology    Laboratory:  Laboratory findings were communicated with the patient who voiced understanding of the findings.    CBC: WBC: 8.8, HGB: 12.8, PLT: 239    BMP:Glc 100 ((H) o/w WNL (Creatinine 0.62)    Wet Prep: clue cells seen; No PMN's seen; No Trichomonas seen; No yeast seen    HCG Quantitative: 24,622 (H)    UA: Blood: Large, Squamous Epithelial: 2 (H), Mucous: Present, o/w negative    ABO/Rh: AB, Pos    Emergency Department Course:    Reviewed:  I reviewed the patient's nursing notes, vitals, past medical records, Care Everywhere.     Assessments:  1727 I first assessed the patient and performed an exam. Discussed plans for care.    1809 Performed pelvic exam with a female chaperone present.    2032 I rechecked the patient and updated them on their results.     2044 Talked with the patient about discharge. Answered all questions.     Consults:   2039 I spoke with Dr. Bloom of the OBGYN service regarding patient's presentation, findings, and  plan of care.    Disposition:  The patient was discharged to home.       Impression & Plan      Medical Decision Making:   Malgorzata Ramachandran is a 26 year old female who presents with vaginal discharge and bleeding.  She was concerned for her pregnancy.  She felt she was approximately 7 weeks pregnant.  She has an upcoming OB appointment but has not seen them yet.  Her main concern was for possible miscarriage.  She is  A1.  She felt that symptoms are somewhat similar to her first miscarriage.  Given her discharge and description of vaginal discomfort as well I did a pelvic exam and the wet prep did show bacterial vaginosis.  This is consistent with symptomatic bacterial vaginosis. The remainder of her laboratory studies were noncontributory.  Rh+ no indication for RhoGam.  She did not have a urinary tract infection.  Her hCG level was appropriate and consistent with ultrasound findings of 6-week pregnancy.  I did discuss the case with Dr. Bloom to confirm the appropriate treatment for symptomatic bacterial vaginosis and a pregnancy.  She did confirm that MetroGel at bedtime was the appropriate treatment and follow-up should be in about 10 days at the end of treatment.  I discussed plan of care and follow-up with patient who is comfortable with plan.  There is no indication for further testing or imaging at this time.  She appears to be safe and appropriate for outpatient management follow-up and is discharged home.    Diagnosis:     ICD-10-CM    1. Vaginal bleeding  N93.9    2. Bacterial vaginitis  N76.0     B96.89        Disposition:   Discharged to home.    Discharge Medications:  New Prescriptions    METRONIDAZOLE (METROGEL-VAGINAL) 0.75 % VAGINAL GEL    5 g daily at HS for five days        Scribe Disclosure:  Linsey SENA, am serving as a scribe on 2021 at 5:27 PM to personally document services performed by Олег Rios NP based on my observations and the provider's  statements to me.           Олег Rios, APRN CNP  02/14/21 [id number]

## 2021-02-14 NOTE — TELEPHONE ENCOUNTER
"Pregnant \"six weeks\" per home pregnancy test.  Cramping and spotting last night and today passed what looked like a \"buggar\" along with a clot. Will go to ER per guidelines.    Meli Newell RN  [facility]      Reason for Disposition    Passed tissue (e.g., gray-white)    Additional Information    Negative: Shock suspected (e.g., cold/pale/clammy skin, too weak to stand, low BP, rapid pulse)    Negative: Difficult to awaken or acting confused (e.g., disoriented, slurred speech)    Negative: Passed out (i.e., lost consciousness, collapsed and was not responding)    Negative: Sounds like a life-threatening emergency to the triager    Negative: SEVERE abdominal pain    Negative: [1] SEVERE vaginal bleeding (i.e., soaking 2 pads / hour, large blood clots) AND [2] present 2 or more hours    Negative: SEVERE dizziness (e.g., unable to stand, requires support to walk, feels like passing out)    Negative: [1] MODERATE vaginal bleeding (i.e., soaking 1 pad / hour; clots) AND [2] present > 6 hours    Negative: [1] MODERATE vaginal bleeding (i.e., soaking 1 pad / hour; clots) AND [2] pregnant > 12 weeks    Protocols used: PREGNANCY - VAGINAL BLEEDING LESS THAN 20 WEEKS EGA-A-AH      "

## 2021-02-14 NOTE — ED TRIAGE NOTES
Patient presents to the ED reporting cramping and spotting. 7 weeks pregnant. Patient reports a light pink spotting. States that has also had some itching and discomfort lately with intercourse.

## 2021-02-25 ENCOUNTER — TELEPHONE (OUTPATIENT)
Dept: OBGYN | Facility: CLINIC | Age: 26
End: 2021-02-25

## 2021-02-25 ENCOUNTER — PRENATAL OFFICE VISIT (OUTPATIENT)
Dept: NURSING | Facility: CLINIC | Age: 26
End: 2021-02-25

## 2021-02-25 DIAGNOSIS — N89.8 VAGINAL DISCHARGE: ICD-10-CM

## 2021-02-25 DIAGNOSIS — Z34.90 SUPERVISION OF NORMAL PREGNANCY: Primary | ICD-10-CM

## 2021-02-25 PROCEDURE — 99207 PR NO CHARGE NURSE ONLY: CPT

## 2021-02-25 RX ORDER — PNV NO.95/FERROUS FUM/FOLIC AC 28MG-0.8MG
TABLET ORAL
COMMUNITY

## 2021-02-25 SDOH — ECONOMIC STABILITY: TRANSPORTATION INSECURITY
IN THE PAST 12 MONTHS, HAS THE LACK OF TRANSPORTATION KEPT YOU FROM MEDICAL APPOINTMENTS OR FROM GETTING MEDICATIONS?: NOT ASKED

## 2021-02-25 SDOH — ECONOMIC STABILITY: TRANSPORTATION INSECURITY
IN THE PAST 12 MONTHS, HAS LACK OF TRANSPORTATION KEPT YOU FROM MEETINGS, WORK, OR FROM GETTING THINGS NEEDED FOR DAILY LIVING?: NOT ASKED

## 2021-02-25 SDOH — ECONOMIC STABILITY: FOOD INSECURITY: WITHIN THE PAST 12 MONTHS, YOU WORRIED THAT YOUR FOOD WOULD RUN OUT BEFORE YOU GOT MONEY TO BUY MORE.: NOT ASKED

## 2021-02-25 SDOH — ECONOMIC STABILITY: FOOD INSECURITY: WITHIN THE PAST 12 MONTHS, THE FOOD YOU BOUGHT JUST DIDN'T LAST AND YOU DIDN'T HAVE MONEY TO GET MORE.: NOT ASKED

## 2021-02-25 SDOH — ECONOMIC STABILITY: INCOME INSECURITY: HOW HARD IS IT FOR YOU TO PAY FOR THE VERY BASICS LIKE FOOD, HOUSING, MEDICAL CARE, AND HEATING?: NOT ASKED

## 2021-02-25 NOTE — NURSING NOTE
NPN nurse visit done over the phone. Pt will be given NPN folder and book at her upcoming appt.   Discussed optional screening available to assess chromosomal anomalies. Questions answered. Pt advise Dr. Wero Lopez.    7w5d    Pap is due      Patient supplied answers from flow sheet for:  Prenatal OB Questionnaire.  Past Medical History  Diabetes?: No  Hypertension : No  Heart disease, mitral valve prolapse or rheumatic fever?: No  An autoimmune disease such as lupus or rheumatoid arthritis?: No  Kidney disease or urinary tract infection?: (!) Yes(hx of UTI)  Epilepsy, seizures or spells?: No  Migraine headaches?: No  A stroke or loss of function or sensation?: No  Any other neurological problems?: No  Have you ever been treated for depression?: No  Are you having problems with crying spells or loss of self-esteem?: No  Have you ever required psychiatric care?: No  Have you ever had hepatitis, liver disease or jaundice?: No  Have you been treated for blood clots in your veins, deep vein thromosis, inflammation in the veins, thrombosis, phlebitis, pulmonary embolism or varicosities?: No  Have you had excessive bleeding after surgery or dental work?: No  Do you bleed more than other women after a cut or scratch?: No  Do you have a history of anemia?: No  Have you ever had thyroid problems or taken thyroid medication?: No   Do you have any endocrine problems?: No  Have you ever been in a major accident or suffered serious trauma?: No  Within the last year, has anyone hit, slapped, kicked or otherwise hurt you?: No  In the last year, has anyone forced you to have sex when you didn't want to?: No    Past Medical History 2   Have you ever received a blood transfusion?: No  Would you refuse a blood transfusion if a doctor judged it to be medically necessary?: No   If you answered Yes, would you rather die than receive a blood transfusion?: No  If you answered Yes, is this for Yazdanism reasons?: No  Does anyone in your  home smoke?: No  Do you use tobacco products?: No  Do you drink beer, wine or hard liquor?: No  Do you use any of the following: marijuana, speed, cocaine, heroin, hallucinogens or other drugs?: No   Is your blood type Rh negative?: No  Have you ever had abnormal antibodies in your blood?: No  Have you ever had asthma?: No  Have you ever had tuberculosis?: No  Do you have any allergies to drugs or over-the-counter medications?: No  Allergies: Dust Mites, Aspartame, Ethanol, Venlafaxine, Hydrochloride, Sertraline: No  Have you had any breast problems?: No  Have you ever ?: No  Have you had any gynecological surgical procedures such as cervical conization, a LEEP procedure, laser treatment, cryosurgery of the cervix or a dilation and curettage, etc?: (!) Yes  Have you ever had any other surgical procedures?: (!) Yes  Have you been hospitalized for a nonsurgical reason excluding normal delivery?: No  Have you ever had any anesthetic complications?: No  Have you ever had an abnormal pap smear?: No    Past Medical History (Continued)  Do you have a history of abnormalities of the uterus?: No  Did your mother take MARY or any other hormones when she was pregnant with you?: No  Did it take you more than a year to become pregnant?: No  Have you ever been evaluated or treated for infertility?: No  Is there a history of medical problems in your family, which you feel may be important to this pregnancy?: No  Do you have any other problems we have not asked about which you feel may be important to this pregnancy?: No    Symptoms since last menstrual period  Do you have any of the following symptoms: abdominal pain, blood in stools or urine, chest pain, shortness of breath, coughing or vomiting up blood, your heart racing or skipping beats, nausea and vomiting, pain on urination or vaginal discharge or bleed: (!) Yes(fatigue, nausea, vomiting, breast tenderness)  Current medications, including over-the-counter  medications, you are using? (If not applicable answer none): pn vitamin  Will the patient be 35 years old or older at the time of delivery?: No    Has the patient, baby's father or anyone in either family had:  Thalassemia (Italian, Greek, Mediterranean or  background only) and an MCV result less than 80?: No  Neural tube defect such as meningomyelocele, spina bifida or anencephaly?: No  Congenital heart defect?: No  Down's Syndrome?: No  Amish-Sachs disease (Zoroastrianism, Cajun, Faroese-French)?: No  Sickle cell disease or trait ()?: No  Hemophilia or other inherited problems of blood?: No  Muscular dystrophy?: No  Cystic fibrosis?: No  Taniya's chorea?: No  Mental retardation/autism?: No  If yes, was the person tested for fragile X?: No  Any other inherited genetic or chromosomal disorder?: No  Maternal metabolic disorder (e.g Insulin-dependent diabetes, PKU)?: No  A child with birth defects not listed above?: No  Recurrent pregnancy loss or stillbirth?: No   Has the patient had any medications/street drugs/alcohol since her last menstrual period?: No  Does the patient or baby's father have any other genetic risks?: No    Infection History   Do you object to being tested for Hepatitis B?: No  Do you object to being tested for HIV?: No   Do you feel that you are at high risk for coming in contact with the AIDS virus?: No  Have you ever been treated for tuberculosis?: No  Have you ever had a positive skin test for tuberculosis?: No  Do you live with someone who has tuberculosis?: No  Have you ever been exposed to tuberculosis?: No  Do you have genital herpes?: No  Does your partner have genital herpes?: No  Have you had a viral illness since your last period?: No  Have you ever had gonorrhea, chlamydia, syphilis, venereal warts, trichomoniasis, pelvic inflammatory disease or any other sexually transmitted disease?: No  Do you know if you are a genital group B streptococcus carrier?: No  Have you had chicken  pox/varicella?: No   Have you been vaccinated against chicken Pox?: Unknown  Have you had any other infectious diseases?: No    Rowan PLUMMER RN

## 2021-02-25 NOTE — TELEPHONE ENCOUNTER
Pt had NPN nurse visit done today.     C/o vag discharge and vag burning.     Pt was seen in the ED on 2/14 and was dx with BV. She finished the 5 day treatment around 2/20/21.   States no improvement after completion of the medication.    Pt has an US and lab appt tomorrow. She will do a self collect wet prep at that time.     Routed to md as an FYI.    Rowan PLUMMER RN

## 2021-02-26 ENCOUNTER — ANCILLARY PROCEDURE (OUTPATIENT)
Dept: ULTRASOUND IMAGING | Facility: CLINIC | Age: 26
End: 2021-02-26

## 2021-02-26 DIAGNOSIS — Z34.90 SUPERVISION OF NORMAL PREGNANCY: ICD-10-CM

## 2021-02-26 LAB
ABO + RH BLD: NORMAL
ABO + RH BLD: NORMAL
BASOPHILS # BLD AUTO: 0 10E9/L (ref 0–0.2)
BASOPHILS NFR BLD AUTO: 0.2 %
BLD GP AB SCN SERPL QL: NORMAL
BLOOD BANK CMNT PATIENT-IMP: NORMAL
DIFFERENTIAL METHOD BLD: NORMAL
EOSINOPHIL # BLD AUTO: 0 10E9/L (ref 0–0.7)
EOSINOPHIL NFR BLD AUTO: 0.2 %
ERYTHROCYTE [DISTWIDTH] IN BLOOD BY AUTOMATED COUNT: 12.8 % (ref 10–15)
HCT VFR BLD AUTO: 36.8 % (ref 35–47)
HGB BLD-MCNC: 12.2 G/DL (ref 11.7–15.7)
LYMPHOCYTES # BLD AUTO: 1.3 10E9/L (ref 0.8–5.3)
LYMPHOCYTES NFR BLD AUTO: 14.6 %
MCH RBC QN AUTO: 30.8 PG (ref 26.5–33)
MCHC RBC AUTO-ENTMCNC: 33.2 G/DL (ref 31.5–36.5)
MCV RBC AUTO: 93 FL (ref 78–100)
MONOCYTES # BLD AUTO: 0.6 10E9/L (ref 0–1.3)
MONOCYTES NFR BLD AUTO: 6 %
NEUTROPHILS # BLD AUTO: 7.2 10E9/L (ref 1.6–8.3)
NEUTROPHILS NFR BLD AUTO: 79 %
PLATELET # BLD AUTO: 246 10E9/L (ref 150–450)
RBC # BLD AUTO: 3.96 10E12/L (ref 3.8–5.2)
SPECIMEN EXP DATE BLD: NORMAL
WBC # BLD AUTO: 9.2 10E9/L (ref 4–11)

## 2021-02-26 PROCEDURE — 76801 OB US < 14 WKS SINGLE FETUS: CPT | Performed by: OBSTETRICS & GYNECOLOGY

## 2021-02-26 PROCEDURE — 85025 COMPLETE CBC W/AUTO DIFF WBC: CPT | Performed by: OBSTETRICS & GYNECOLOGY

## 2021-02-26 PROCEDURE — 36415 COLL VENOUS BLD VENIPUNCTURE: CPT | Performed by: OBSTETRICS & GYNECOLOGY

## 2021-02-26 PROCEDURE — 86901 BLOOD TYPING SEROLOGIC RH(D): CPT | Performed by: OBSTETRICS & GYNECOLOGY

## 2021-02-26 PROCEDURE — 86787 VARICELLA-ZOSTER ANTIBODY: CPT | Performed by: OBSTETRICS & GYNECOLOGY

## 2021-02-26 PROCEDURE — 87086 URINE CULTURE/COLONY COUNT: CPT | Performed by: OBSTETRICS & GYNECOLOGY

## 2021-02-26 PROCEDURE — 87340 HEPATITIS B SURFACE AG IA: CPT | Performed by: OBSTETRICS & GYNECOLOGY

## 2021-02-26 PROCEDURE — 86850 RBC ANTIBODY SCREEN: CPT | Performed by: OBSTETRICS & GYNECOLOGY

## 2021-02-26 PROCEDURE — 99000 SPECIMEN HANDLING OFFICE-LAB: CPT | Performed by: OBSTETRICS & GYNECOLOGY

## 2021-02-26 PROCEDURE — 86900 BLOOD TYPING SEROLOGIC ABO: CPT | Performed by: OBSTETRICS & GYNECOLOGY

## 2021-02-26 PROCEDURE — 86762 RUBELLA ANTIBODY: CPT | Performed by: OBSTETRICS & GYNECOLOGY

## 2021-02-26 PROCEDURE — 76817 TRANSVAGINAL US OBSTETRIC: CPT | Performed by: OBSTETRICS & GYNECOLOGY

## 2021-02-26 PROCEDURE — 87389 HIV-1 AG W/HIV-1&-2 AB AG IA: CPT | Performed by: OBSTETRICS & GYNECOLOGY

## 2021-02-26 PROCEDURE — 86780 TREPONEMA PALLIDUM: CPT | Mod: 90 | Performed by: OBSTETRICS & GYNECOLOGY

## 2021-02-27 LAB
BACTERIA SPEC CULT: NORMAL
Lab: NORMAL
SPECIMEN SOURCE: NORMAL
T PALLIDUM AB SER QL: NONREACTIVE

## 2021-02-28 ENCOUNTER — HEALTH MAINTENANCE LETTER (OUTPATIENT)
Age: 26
End: 2021-02-28

## 2021-03-01 LAB
HBV SURFACE AG SERPL QL IA: NONREACTIVE
HIV 1+2 AB+HIV1 P24 AG SERPL QL IA: NONREACTIVE
RUBV IGG SERPL IA-ACNC: 16 IU/ML
VZV IGG SER QL IA: 2.8 AI (ref 0–0.8)

## 2021-03-15 ENCOUNTER — PRENATAL OFFICE VISIT (OUTPATIENT)
Dept: OBGYN | Facility: CLINIC | Age: 26
End: 2021-03-15

## 2021-03-15 VITALS — BODY MASS INDEX: 37.13 KG/M2 | WEIGHT: 203 LBS | DIASTOLIC BLOOD PRESSURE: 62 MMHG | SYSTOLIC BLOOD PRESSURE: 120 MMHG

## 2021-03-15 DIAGNOSIS — Z34.80 SUPERVISION OF OTHER NORMAL PREGNANCY, ANTEPARTUM: ICD-10-CM

## 2021-03-15 DIAGNOSIS — O21.9 NAUSEA AND VOMITING IN PREGNANCY: ICD-10-CM

## 2021-03-15 DIAGNOSIS — Z12.4 SCREENING FOR CERVICAL CANCER: Primary | ICD-10-CM

## 2021-03-15 DIAGNOSIS — O34.219 PREVIOUS CESAREAN DELIVERY, ANTEPARTUM CONDITION OR COMPLICATION: ICD-10-CM

## 2021-03-15 PROCEDURE — G0145 SCR C/V CYTO,THINLAYER,RESCR: HCPCS | Performed by: OBSTETRICS & GYNECOLOGY

## 2021-03-15 PROCEDURE — 99203 OFFICE O/P NEW LOW 30 MIN: CPT | Performed by: OBSTETRICS & GYNECOLOGY

## 2021-03-15 RX ORDER — ONDANSETRON 4 MG/1
4 TABLET, ORALLY DISINTEGRATING ORAL EVERY 8 HOURS PRN
Qty: 115 TABLET | Refills: 1 | Status: SHIPPED | OUTPATIENT
Start: 2021-03-15 | End: 2021-08-27

## 2021-03-15 NOTE — NURSING NOTE
"Chief Complaint   Patient presents with     Prenatal Care     10 2/7 weeks       Initial /62   Wt 92.1 kg (203 lb)   LMP 2021 (Approximate)   BMI 37.13 kg/m   Estimated body mass index is 37.13 kg/m  as calculated from the following:    Height as of 11/10/18: 1.575 m (5' 2\").    Weight as of this encounter: 92.1 kg (203 lb).  BP completed using cuff size: regular    Questioned patient about current smoking habits.  Pt. has never smoked.          The following HM Due: NONE    Kamille Morocho CMA    "

## 2021-03-15 NOTE — PROGRESS NOTES
Malgorzata is a 26 year old  at 10w2d here for new ob visit.      Doing well.  Unplanned but welcome.  FOB Riky is with her today.  They have two daughters, ages 4 and 7 .  She has a little nausea, otherwise feeling pretty well.  H/o CS x2, plans repeat. Works at the [employer] lab.    Due for pap smear.     OB History    Para Term  AB Living   4 2 2 0 1 2   SAB TAB Ectopic Multiple Live Births   1 0 0 0 2      # Outcome Date GA Lbr Farooq/2nd Weight Sex Delivery Anes PTL Lv   4 Current            3 Term 17 39w0d  3.66 kg (8 lb 1.1 oz) F CS-LTranv Spinal  OSMAN      Name: Enid      Apgar1: 9  Apgar5: 9   2 Term 14 41w1d  3.515 kg (7 lb 12 oz) F CS-Unspec Spinal  OSMAN      Complications: Fetal Intolerance      Name: Miah Mirza SAB  13w0d              ROS: Ten point review of systems was reviewed and negative except the above.    Past Medical History:   Diagnosis Date     Abnormal Pap smear      Past Surgical History:   Procedure Laterality Date     ANESTH,LOWER ARM SKIN SURG       BIOPSY OF SKIN LESION        SECTION  2014      SECTION N/A 2017    Procedure:  SECTION;  Surgeon: Teodora Dodson DO;  Location: [facility] L+D     Patient Active Problem List    Diagnosis Date Noted     Nexplanon insertion 2017     Priority: Medium     2017  [id number]       S/P  section 2017     Priority: Medium     Encounter for triage in pregnant patient 2017     Priority: Medium     High-risk pregnancy, young multigravida 2017     Priority: Medium     Previous  delivery, antepartum condition or complication 2017     Priority: Medium     Desires repeat scheduled C/S       Rubella non-immune status, antepartum 2016     Priority: Medium     Melanocytic nevus 2008     Priority: Medium     Overview:   ICD 10       Difficulty with family 2003     Priority: Medium     Overview:   father is Jim a factory  worker and Mother is Shi stay at home mom  Epic         No Known Allergies  Prenatal Vit-Fe Fumarate-FA (PRENATAL VITAMIN) 27-0.8 MG TABS,     No current facility-administered medications on file prior to visit.       Physical Exam:   /62   Wt 92.1 kg (203 lb)   LMP 2021 (Approximate)   BMI 37.13 kg/m      Gen:  no acute distress, comfortable, smiling  HENT: No scleral injection or icterus  CV: Regular rate and rhythm, no m/g/r  Resp: Normal work of breathing, no cough  GI: Abdomen soft, non-tender. No masses, organomegaly  Skin: No suspicious lesions or rashes  Psychiatric: mentation appears normal and affect bright  Pfannenstiel c/d/i and well healed  : SSE no blood in vault, pap obtained    Doptones 160s   FH cwd    Lab Results   Component Value Date    ABO AB 2021    RH Pos 2021       A/P 26 year old  at 10w2d here for NOB visit.  - Discussed physician coverage, tertiary support, diet, exercise, weight gain, schedule of visits, routine and indicated ultrasounds, and childbirth education.  Discussed MFM coverage and reviewed options for  testing in the first trimester.  Recommended PNV.  NOB labs and US reviewed.       Concerns:  - AB+/RI.  FOB Alam.  Declines NIPT.   - H/o CS x2, plans repeat.  - zofran given for nausea    RTC 4 weeks    Rhona Lopez MD, MPH  [facility]

## 2021-03-17 LAB
COPATH REPORT: NORMAL
PAP: NORMAL

## 2021-03-29 ENCOUNTER — PATIENT OUTREACH (OUTPATIENT)
Dept: OBGYN | Facility: CLINIC | Age: 26
End: 2021-03-29

## 2021-03-29 DIAGNOSIS — R87.619 ABNORMAL PAP SMEAR OF CERVIX: ICD-10-CM

## 2021-03-29 NOTE — TELEPHONE ENCOUNTER
"H/O abnormal pap (no records)  8/2016 NIL pap  3/15/21 NIL pap @ age 26. Plan per provider: \"follow-up pap next year, and if normal she can go into the low risk pool\"  "

## 2021-03-29 NOTE — LETTER
March 29, 2021      Malgorzata Ramachandran  [address]  [place] [address]        Dear MsRosiFigueroa Ramachandran,    We are happy to inform you that your recent Pap smear is normal.    It is recommended that you have your next Pap smear in 1 year. You will also need to return to the clinic every year for an annual wellness visit.    If you have additional questions regarding this result, please contact our office and we will be happy to assist you.      Sincerely,    Your [facility] Care Team

## 2021-03-31 ENCOUNTER — HOSPITAL ENCOUNTER (EMERGENCY)
Facility: CLINIC | Age: 26
Discharge: HOME | End: 2021-03-31
Attending: EMERGENCY MEDICINE | Admitting: EMERGENCY MEDICINE

## 2021-03-31 VITALS
OXYGEN SATURATION: 100 % | RESPIRATION RATE: 20 BRPM | SYSTOLIC BLOOD PRESSURE: 114 MMHG | HEART RATE: 80 BPM | TEMPERATURE: 98.2 F | DIASTOLIC BLOOD PRESSURE: 61 MMHG

## 2021-03-31 DIAGNOSIS — R10.13 EPIGASTRIC PAIN: ICD-10-CM

## 2021-03-31 DIAGNOSIS — O21.0 HYPEREMESIS GRAVIDARUM: ICD-10-CM

## 2021-03-31 LAB
ALBUMIN SERPL-MCNC: 3.5 G/DL (ref 3.4–5)
ALP SERPL-CCNC: 76 U/L (ref 40–150)
ALT SERPL W P-5'-P-CCNC: 13 U/L (ref 0–50)
ANION GAP SERPL CALCULATED.3IONS-SCNC: 4 MMOL/L (ref 3–14)
AST SERPL W P-5'-P-CCNC: 8 U/L (ref 0–45)
BILIRUB SERPL-MCNC: 0.1 MG/DL (ref 0.2–1.3)
BUN SERPL-MCNC: 10 MG/DL (ref 7–30)
CALCIUM SERPL-MCNC: 9 MG/DL (ref 8.5–10.1)
CHLORIDE SERPL-SCNC: 108 MMOL/L (ref 94–109)
CO2 SERPL-SCNC: 24 MMOL/L (ref 20–32)
CREAT SERPL-MCNC: 0.68 MG/DL (ref 0.52–1.04)
ERYTHROCYTE [DISTWIDTH] IN BLOOD BY AUTOMATED COUNT: 12.6 % (ref 10–15)
GFR SERPL CREATININE-BSD FRML MDRD: >90 ML/MIN/{1.73_M2}
GLUCOSE SERPL-MCNC: 70 MG/DL (ref 70–99)
HCT VFR BLD AUTO: 37.8 % (ref 35–47)
HGB BLD-MCNC: 12.8 G/DL (ref 11.7–15.7)
LIPASE SERPL-CCNC: 69 U/L (ref 73–393)
MCH RBC QN AUTO: 30.8 PG (ref 26.5–33)
MCHC RBC AUTO-ENTMCNC: 33.9 G/DL (ref 31.5–36.5)
MCV RBC AUTO: 91 FL (ref 78–100)
PLATELET # BLD AUTO: 241 10E9/L (ref 150–450)
POTASSIUM SERPL-SCNC: 3.7 MMOL/L (ref 3.4–5.3)
PROT SERPL-MCNC: 7.9 G/DL (ref 6.8–8.8)
RBC # BLD AUTO: 4.16 10E12/L (ref 3.8–5.2)
SODIUM SERPL-SCNC: 136 MMOL/L (ref 133–144)
WBC # BLD AUTO: 9.8 10E9/L (ref 4–11)

## 2021-03-31 PROCEDURE — 96375 TX/PRO/DX INJ NEW DRUG ADDON: CPT

## 2021-03-31 PROCEDURE — 83690 ASSAY OF LIPASE: CPT | Performed by: EMERGENCY MEDICINE

## 2021-03-31 PROCEDURE — 250N000011 HC RX IP 250 OP 636: Performed by: EMERGENCY MEDICINE

## 2021-03-31 PROCEDURE — 80053 COMPREHEN METABOLIC PANEL: CPT | Performed by: EMERGENCY MEDICINE

## 2021-03-31 PROCEDURE — 85027 COMPLETE CBC AUTOMATED: CPT | Performed by: EMERGENCY MEDICINE

## 2021-03-31 PROCEDURE — 96374 THER/PROPH/DIAG INJ IV PUSH: CPT

## 2021-03-31 PROCEDURE — 250N000013 HC RX MED GY IP 250 OP 250 PS 637: Performed by: EMERGENCY MEDICINE

## 2021-03-31 PROCEDURE — 96361 HYDRATE IV INFUSION ADD-ON: CPT

## 2021-03-31 PROCEDURE — 250N000009 HC RX 250: Performed by: EMERGENCY MEDICINE

## 2021-03-31 PROCEDURE — 76815 OB US LIMITED FETUS(S): CPT

## 2021-03-31 PROCEDURE — 258N000003 HC RX IP 258 OP 636: Performed by: EMERGENCY MEDICINE

## 2021-03-31 RX ORDER — METOCLOPRAMIDE HYDROCHLORIDE 5 MG/ML
10 INJECTION INTRAMUSCULAR; INTRAVENOUS ONCE
Status: COMPLETED | OUTPATIENT
Start: 2021-03-31 | End: 2021-03-31

## 2021-03-31 RX ORDER — FAMOTIDINE 20 MG/1
20 TABLET, FILM COATED ORAL 2 TIMES DAILY
Qty: 60 TABLET | Refills: 0 | Status: SHIPPED | OUTPATIENT
Start: 2021-03-31 | End: 2021-04-30

## 2021-03-31 RX ORDER — DIPHENHYDRAMINE HYDROCHLORIDE 50 MG/ML
25 INJECTION INTRAMUSCULAR; INTRAVENOUS ONCE
Status: COMPLETED | OUTPATIENT
Start: 2021-03-31 | End: 2021-03-31

## 2021-03-31 RX ORDER — METOCLOPRAMIDE 10 MG/1
10 TABLET ORAL 4 TIMES DAILY PRN
Qty: 15 TABLET | Refills: 0 | Status: SHIPPED | OUTPATIENT
Start: 2021-03-31 | End: 2021-08-11

## 2021-03-31 RX ADMIN — LIDOCAINE HYDROCHLORIDE 30 ML: 20 SOLUTION ORAL; TOPICAL at 20:44

## 2021-03-31 RX ADMIN — SODIUM CHLORIDE, POTASSIUM CHLORIDE, SODIUM LACTATE AND CALCIUM CHLORIDE 1000 ML: 600; 310; 30; 20 INJECTION, SOLUTION INTRAVENOUS at 20:41

## 2021-03-31 RX ADMIN — DIPHENHYDRAMINE HYDROCHLORIDE 25 MG: 50 INJECTION INTRAMUSCULAR; INTRAVENOUS at 20:45

## 2021-03-31 RX ADMIN — METOCLOPRAMIDE HYDROCHLORIDE 10 MG: 5 INJECTION INTRAMUSCULAR; INTRAVENOUS at 20:43

## 2021-03-31 ASSESSMENT — ENCOUNTER SYMPTOMS
ABDOMINAL PAIN: 1
DYSURIA: 0
NAUSEA: 1
LIGHT-HEADEDNESS: 1
VOMITING: 1

## 2021-04-01 NOTE — ED PROVIDER NOTES
History   Chief Complaint:  Abdominal Pain and Emesis During Pregnancy     HPI   Malgorzata Ramachandran is an Rh+  13 week pregnant 26 year old female who presents with abdominal pain and vomiting. Patient states she has been struggling with nausea and vomiting over the last couple weeks and she has been unable to keep much down. She was given zofran by her OB but has not resolved the nausea. After multiple episodes of vomiting, she now feels an epigastric abdominal pain. She reports a similar pain before she was pregnant which she drank something and felt better. The pain is non-radiating and constant. She also felt extremely light-headed today and fell onto her buttocks due to generalized weakness. She slid down a couple stairs but no loss of consciousness. Denies worsening pain with eating or movement of pain. Denies dysuria or vaginal bleeding. No other abdominal surgeries besides . Her next OB appointment is scheduled on 4/15.     Review of Systems   Gastrointestinal: Positive for abdominal pain, nausea and vomiting.   Genitourinary: Negative for dysuria and vaginal bleeding.   Neurological: Positive for light-headedness.   All other systems reviewed and are negative.      Allergies:  No Known Allergies    Medications:  Zofran    Past Medical History:    Rubella    Past Surgical History:     x2  Lower arm skin surgery    Social History:  Presents alone  Patient drove herself today    Physical Exam     Patient Vitals for the past 24 hrs:   BP Temp Temp src Pulse Resp SpO2   21 114/61 -- -- 80 -- 100 %   21 2100 109/63 -- -- 82 -- 100 %   21 130/71 -- -- 77 -- 100 %   21 123/70 -- -- 69 -- 99 %   21 -- -- -- -- -- 99 %   21 -- -- -- -- -- 100 %   21 1944 132/81 98.2  F (36.8  C) Oral 90 20 100 %       Physical Exam    Eyes:    Conjunctiva normal  Neck:    Supple, no meningismus.     CV:     Regular rate and rhythm.       No murmurs, rubs or gallops.     No lower extremity edema.  PULM:    Clear to auscultation bilateral.       No respiratory distress.      Good air exchange.     No rales or wheezing.  ABD:    Soft, non-distended.       Mild focal tenderness in the epigastric area.     Uterine fundus palpable in the low abdomen     No pulsatile masses.       No rebound, guarding or rigidity.     No CVA tenderness.   MSK:     No gross deformity to all four extremities.   LYMPH:   No cervical lymphadenopathy.  NEURO:   Alert.  Good muscular tone, no atrophy.   Skin:    Warm, dry and intact.    Psych:    Mood is good and affect is appropriate.    Emergency Department Course     Laboratory:   CBC: WBC 9.8, HGB 12.8,   CMP: bilirubin total 0.1 (L) o/w WNL (Creatinine 0.68)    Lipase: 69 (L)     Procedures  POC US OB Transabdominal Limited  PROCEDURE: PERFORMED BY: Dr. Matias Liu MD   INDICATIONS/SYMPTOM: Abdominal Pain   PROBE: Low frequency convex probe   Type of US Study: Transabdominal Exam   BODY LOCATION: Pelvis   FINDINGS: Fetal heart rate: Present and counted at _154__ bpm.   INTERPRETATION: Normal pelvic ultrasound with intrauterine pregnancy present. FHR was 154 with noted fetal activity.  No pelvic free fluid was present. No adnexal abnormality noted.   IMAGE DOCUMENTATION: Images were archived to PACs system.     Emergency Department Course:    Reviewed:  I reviewed nursing notes, vitals, past medical history and care everywhere    Assessments:  2006 I obtained history and examined the patient as noted above.   2122 I rechecked the patient and explained findings, her pain and nausea are gone, she drank some fluids and is ready to go home.     Interventions:  2041: NS 1L IV Bolus    2043: Reglan 10 mg IV   2044: GI Cocktail - Maalox 15 mL, Viscous Lidocaine 15 mL, 30 mL suspension PO    2045: Benadryl 25 mg IV     Disposition:  The patient was discharged to home.     Impression & Plan       Medical  Decision Makin-year-old female  at 13 weeks presents with hyperemesis gravidarum and developing epigastric pain after recurrent vomiting.  Basic laboratory studies are unremarkable with no electrolyte disturbance or profound intravascular volume depletion.  She had remarkable improvement with Reglan and IV fluids.  She was able to tolerate a p.o. challenge.  She is currently taking Zofran and will be provided additional prescription for Reglan.  Close follow-up with primary obstetrician.    In regards to her epigastric pain, no concerns for biliary disease or findings of hepatitis/pancreatitis.  She had complete resolution of her pain with GI cocktail indicating likely reflux or esophagitis.  She will be initiated on famotidine.  Bedside ultrasound reveals viable IUP with fetal heart rate at 154.    Diagnosis:    ICD-10-CM    1. Epigastric pain  R10.13    2. Hyperemesis gravidarum  O21.0        Discharge Medications:  New Prescriptions    FAMOTIDINE (PEPCID) 20 MG TABLET    Take 1 tablet (20 mg) by mouth 2 times daily    METOCLOPRAMIDE (REGLAN) 10 MG TABLET    Take 1 tablet (10 mg) by mouth 4 times daily as needed (nausea)       Scribe Disclosure:  I, Xiomy Crump, am serving as a scribe at 7:51 PM on 3/31/2021 to document services personally performed by Matias Liu MD based on my observations and the provider's statements to me.             Matias Liu MD  21 [id number]

## 2021-04-01 NOTE — ED NOTES
Patient verbalized understanding about prescriptions and follow up. Pt ambulated out of department.

## 2021-04-01 NOTE — ED TRIAGE NOTES
Arrives complaining of abdominal pian, vomiting, and dizziness. States she is 13 weeks gestation. Alert and oriented, ABCs intact.

## 2021-04-12 ENCOUNTER — PRENATAL OFFICE VISIT (OUTPATIENT)
Dept: OBGYN | Facility: CLINIC | Age: 26
End: 2021-04-12

## 2021-04-12 VITALS — WEIGHT: 201 LBS | DIASTOLIC BLOOD PRESSURE: 68 MMHG | BODY MASS INDEX: 36.76 KG/M2 | SYSTOLIC BLOOD PRESSURE: 120 MMHG

## 2021-04-12 DIAGNOSIS — Z34.80 SUPERVISION OF OTHER NORMAL PREGNANCY, ANTEPARTUM: ICD-10-CM

## 2021-04-12 DIAGNOSIS — Z30.2 ENCOUNTER FOR STERILIZATION: ICD-10-CM

## 2021-04-12 DIAGNOSIS — O34.219 PREVIOUS CESAREAN DELIVERY, ANTEPARTUM CONDITION OR COMPLICATION: ICD-10-CM

## 2021-04-12 DIAGNOSIS — Z3A.20 20 WEEKS GESTATION OF PREGNANCY: Primary | ICD-10-CM

## 2021-04-12 PROCEDURE — 99207 PR PRENATAL VISIT: CPT | Performed by: OBSTETRICS & GYNECOLOGY

## 2021-04-12 NOTE — NURSING NOTE
"Chief Complaint   Patient presents with     Prenatal Care     14 2/7 weeks       Initial /68   Wt 91.2 kg (201 lb)   LMP 2021 (Approximate)   BMI 36.76 kg/m   Estimated body mass index is 36.76 kg/m  as calculated from the following:    Height as of 11/10/18: 1.575 m (5' 2\").    Weight as of this encounter: 91.2 kg (201 lb).  BP completed using cuff size: large    Questioned patient about current smoking habits.  Pt. has never smoked.          The following HM Due: NONE    Kamille Morocho CMA    "

## 2021-04-12 NOTE — PROGRESS NOTES
26 year old  at 14w2d     AB+/RI.  No NIPT.  Anatomy US ordered.   H/o CS x2, planning RLTCS + BTL, yet to schedule.   Reviewed palliative measures and OTC meds for nausea    RTC 4 wks     Rhona Lopez MD, MPH  [facility]

## 2021-04-18 ENCOUNTER — HEALTH MAINTENANCE LETTER (OUTPATIENT)
Age: 26
End: 2021-04-18

## 2021-04-30 ENCOUNTER — TELEPHONE (OUTPATIENT)
Dept: OBGYN | Facility: CLINIC | Age: 26
End: 2021-04-30

## 2021-04-30 ENCOUNTER — TRANSCRIBE ORDERS (OUTPATIENT)
Dept: MATERNAL FETAL MEDICINE | Facility: CLINIC | Age: 26
End: 2021-04-30

## 2021-04-30 DIAGNOSIS — O26.90 PREGNANCY RELATED CONDITION, ANTEPARTUM: Primary | ICD-10-CM

## 2021-04-30 NOTE — TELEPHONE ENCOUNTER
I called Malgorzata after talking to Jessica, pt has a BMI of 37, she needs MFM U/S for anatomy. I did cancel the other U/S.  Kamille Morocho, CMA

## 2021-05-12 ENCOUNTER — PRE VISIT (OUTPATIENT)
Dept: MATERNAL FETAL MEDICINE | Facility: CLINIC | Age: 26
End: 2021-05-12

## 2021-05-14 ENCOUNTER — PREP FOR PROCEDURE (OUTPATIENT)
Dept: OBGYN | Facility: CLINIC | Age: 26
End: 2021-05-14

## 2021-05-14 ENCOUNTER — PRENATAL OFFICE VISIT (OUTPATIENT)
Dept: OBGYN | Facility: CLINIC | Age: 26
End: 2021-05-14
Attending: OBSTETRICS & GYNECOLOGY

## 2021-05-14 ENCOUNTER — TELEPHONE (OUTPATIENT)
Dept: OBGYN | Facility: CLINIC | Age: 26
End: 2021-05-14

## 2021-05-14 VITALS — SYSTOLIC BLOOD PRESSURE: 126 MMHG | DIASTOLIC BLOOD PRESSURE: 68 MMHG | BODY MASS INDEX: 37.68 KG/M2 | WEIGHT: 206 LBS

## 2021-05-14 DIAGNOSIS — Z30.2 REQUEST FOR STERILIZATION: ICD-10-CM

## 2021-05-14 DIAGNOSIS — O34.219 PREVIOUS CESAREAN DELIVERY, ANTEPARTUM CONDITION OR COMPLICATION: Primary | ICD-10-CM

## 2021-05-14 DIAGNOSIS — Z30.2 ENCOUNTER FOR STERILIZATION: ICD-10-CM

## 2021-05-14 DIAGNOSIS — Z98.891 PREVIOUS CESAREAN SECTION: Primary | ICD-10-CM

## 2021-05-14 PROCEDURE — 99207 PR PRENATAL VISIT: CPT | Performed by: OBSTETRICS & GYNECOLOGY

## 2021-05-14 NOTE — NURSING NOTE
"Chief Complaint   Patient presents with     Prenatal Care     18 6/7 weeks       Initial /68   Wt 93.4 kg (206 lb)   LMP 2021 (Approximate)   BMI 37.68 kg/m   Estimated body mass index is 37.68 kg/m  as calculated from the following:    Height as of 11/10/18: 1.575 m (5' 2\").    Weight as of this encounter: 93.4 kg (206 lb).  BP completed using cuff size: regular    Questioned patient about current smoking habits.  Pt. has never smoked.          The following HM Due: NONE    +fetal movement  Kamille Morocho, CMA    "

## 2021-05-14 NOTE — TELEPHONE ENCOUNTER
Procedure name(s) - multi select  Delivery + bilateral salpingectomy   Reason for procedure repeat, desires sterilization   Surgeon: Wero Lopez   Is this a multi surgeon case? No   Laterality Bilateral   Request for additional equipment Other (see comments)   Comment: None   Anesthesia Spinal   Positioning (if different from preference card): Supine   Initiate Pre-op orders for above procedure: Yes, as ordered in Epic   Comment: additional orders noted there also   Location of Case: [facility]   Operating room  requested: Yes   Urgency of Surgery: Routine   Surgeon Procedure Time (incision to closure) in minutes (per procedure as applicable) 60   Note:  Surgical Case Time Needed (in minutes)   Patient Class (for admit prior to surgery, specify number of days in comments): Surgery admit   Comment: admit day of surgery   Length of Stay: 2 days   H&P To Be Completed By: Surgeon    needed? No   Post-Op Appointment None   Vendor Needed? No   Other/Additional Comments, as needed: can schedule for  at 9am with Mary Lou

## 2021-05-14 NOTE — PROGRESS NOTES
26 year old  at 18w6d     AB+/RI.  No NIPT.  Anatomy US scheduled with MFM next week.  Reviewed quickening.   H/o CS x2, planning RLTCS + BTL, signed FTP today, will schedule procedure for     RTC 4 wks     Rhona Lopez MD, MPH  [facility]

## 2021-05-14 NOTE — TELEPHONE ENCOUNTER
Type of surgery:  delivery with bilateral salpingectomy  Location of surgery: [facility]  Date and time of surgery: 10/4/21 @ 9:00 am  Surgeon: Dr. Wero Lopez  Pre-Op Appt Date: @ prenatal appointment  Post-Op Appt Date: Patient advised to schedule.   Packet sent out: Yes  Pre-cert/Authorization completed:  No  Date: 21

## 2021-05-19 ENCOUNTER — OFFICE VISIT (OUTPATIENT)
Dept: MATERNAL FETAL MEDICINE | Facility: CLINIC | Age: 26
End: 2021-05-19
Attending: OBSTETRICS & GYNECOLOGY

## 2021-05-19 ENCOUNTER — HOSPITAL ENCOUNTER (OUTPATIENT)
Dept: ULTRASOUND IMAGING | Facility: CLINIC | Age: 26
End: 2021-05-19
Attending: OBSTETRICS & GYNECOLOGY
Payer: MEDICAID

## 2021-05-19 DIAGNOSIS — O26.90 PREGNANCY RELATED CONDITION, ANTEPARTUM: ICD-10-CM

## 2021-05-19 DIAGNOSIS — O99.212 MATERNAL OBESITY SYNDROME, ANTEPARTUM, SECOND TRIMESTER: Primary | ICD-10-CM

## 2021-05-19 PROCEDURE — 76811 OB US DETAILED SNGL FETUS: CPT

## 2021-05-19 PROCEDURE — 76811 OB US DETAILED SNGL FETUS: CPT | Mod: 26 | Performed by: OBSTETRICS & GYNECOLOGY

## 2021-05-19 NOTE — PROGRESS NOTES
Please see full imaging report from [employer] program under imaging tab.    Atif Mccormick MD  Maternal Fetal Medicine

## 2021-06-15 ENCOUNTER — PRENATAL OFFICE VISIT (OUTPATIENT)
Dept: OBGYN | Facility: CLINIC | Age: 26
End: 2021-06-15
Payer: MEDICAID

## 2021-06-15 VITALS — SYSTOLIC BLOOD PRESSURE: 100 MMHG | WEIGHT: 205 LBS | DIASTOLIC BLOOD PRESSURE: 64 MMHG | BODY MASS INDEX: 37.49 KG/M2

## 2021-06-15 DIAGNOSIS — Z34.80 SUPERVISION OF OTHER NORMAL PREGNANCY, ANTEPARTUM: Primary | ICD-10-CM

## 2021-06-15 DIAGNOSIS — N89.8 VAGINAL DISCHARGE: ICD-10-CM

## 2021-06-15 LAB
SPECIMEN SOURCE: NORMAL
WET PREP SPEC: NORMAL

## 2021-06-15 PROCEDURE — 87210 SMEAR WET MOUNT SALINE/INK: CPT | Performed by: ADVANCED PRACTICE MIDWIFE

## 2021-06-15 PROCEDURE — 99207 PR PRENATAL VISIT: CPT | Performed by: ADVANCED PRACTICE MIDWIFE

## 2021-06-15 RX ORDER — CLOTRIMAZOLE 1 %
1 CREAM WITH APPLICATOR VAGINAL AT BEDTIME
Qty: 45 G | Refills: 0 | Status: SHIPPED | OUTPATIENT
Start: 2021-06-15 | End: 2021-08-11

## 2021-06-15 NOTE — PATIENT INSTRUCTIONS
Vaginitis (Vaginal Irritation/Infection)    Vaginitis is very common!  The most common vaginal infections are bacterial vaginosis or yeast. These infections are not sexually transmitted but can be incredibly uncomfortable. Seek care from your midwife if signs or symptoms arise.     Normal vaginal discharge:      Is white, clear, thick or thin (it may change depending on where you are in your cycle)    Does not have a foul odor    The amount of discharge varies    Abnormal discharge/symptoms:       Itching in and around the vagina    Redness, pain or swelling    Discharge that is foamy, greenish, curd like, or bloody    Foul smelling odor    Pain when urinating or having sex    Fever    Causes of vaginal infections:      Good bacteria from the vagina have been destroyed by bad bacteria    Reaction to something in the vagina such as a tampon or scented/perfumed soaps or bubble bath    STI's    Sensitivities to soaps/detergents/dryer sheets, lubricants, etc.    Hormonal changes    Recent use of antibiotics     Infections can also occur after you've had intercourse with a new partner or if you have had frequent intercourse         Here is a list of suggestions that may help prevent/treat vaginal infections and will help maintain a healthy vaginal environment:      1.  Boosting your immune system so you can heal faster      Make sure you are getting adequate sleep    Drink 2-3 quarts of fluids per day, Cranberries or cranberry juice (unsweetened)    Eat more nuts, grains, raw veggies, yogurt, ishan, grapefruit    Decrease intake of refined sugar, red meat and alcohol    Echinacea - 3 times a day for chronic problem or every 2 hours for acute symptoms; use as directed on bottle          2.  Changing the vaginal environment to a more acid state       Soak in a warm bath tub with one cup of vinegar or lemon juice. Do not use scented soap, bubble bath, or oils.     Acidophilus capsules:  1 in your vagina at bedtime for 5-7  nights    Herbal sitz bath or deanna-wash with:  TBSP tea tree oil or 2 TBS cider vinegar      3.  Increasing the good healthy bacteria      At each meal drink 1 tsp apple cider vinegar and 1 tsp honey in   cup warm water    Eat garlic daily, capsules or fresh.      Take probiotics 4-8 billion units/day      4.  Preventive measures      Wear cotton underwear, no thongs.  Do not wear tight clothes or pantyhose    Shower soon after working or change out of sweaty clothing     Do not wear underwear to bed.  The vaginal environment needs to breathe    Never douche or use vaginal , the vagina is self-cleaning!    Use white, unscented toilet paper.  Do not use baby wipes.  Wipe from front to back    Use only unscented tampons and pads, buy organic products if desired    Do not use perfumes/oils/lotions near your vagina or take bubble baths    Use only mild, unscented soaps around your vaginal area     Do not use fabric softeners/dryer sheets    Use gentle, unscented detergent, consider buying non-petroleum based detergents    Use only water based lubricants during sexual contact    Abstain from intercourse during times of infection    Alternative Treatment  Boric acid capsules one per vagina (not by mouth!!! Very toxic if taken orally) at bedtime for 5 days (or as suggested by your provider) may be an effective alternative treatment and also more effective for those with chronic yeast vaginitis. Boric acid is available at the pharmacy but must be purchased along with gelatin caps for insertion. It might also be available at a local compounding pharmacy. Boric acid is not safe for pregnant women. Discuss with your midwife if this treatment interests you.     If your symptoms do not resolve or if you have questions please call:     [facility]  [phone]

## 2021-06-15 NOTE — NURSING NOTE
"Chief Complaint   Patient presents with     Prenatal Care     23 3/7 weeks       Initial /64   Wt 93 kg (205 lb)   LMP 2021 (Approximate)   BMI 37.49 kg/m   Estimated body mass index is 37.49 kg/m  as calculated from the following:    Height as of 11/10/18: 1.575 m (5' 2\").    Weight as of this encounter: 93 kg (205 lb).  BP completed using cuff size: large    Questioned patient about current smoking habits.  Pt. has never smoked.          The following HM Due: NONE    +fetal movement  +slight swelling  +yeast infection???    Kamille Morocho, CMA    "

## 2021-06-15 NOTE — PROGRESS NOTES
S: Feels like she had a yeast infection. Has had them with her previous pregnancies.,  Baby active.  Denies uterine cramping, vaginal bleeding or leaking of fluid  O: Vitals: /64   Wt 93 kg (205 lb)   LMP 01/02/2021 (Approximate)   BMI 37.49 kg/m    BMI= Body mass index is 37.49 kg/m .  Exam:  Constitutional: healthy, alert and no distress  Respiratory: respirations even and unlabored  Gastrointestinal: Abdomen soft, non-tender. Fundus measures appropriate for gestational age. Fetal heart tones hear without difficulty and within normal limits  : Normal external genitalia without lesions, vagina red and very tender to the touch. White curd like discharge.  Psychiatric: mentation appears normal and affect normal/bright  A:     ICD-10-CM    1. Supervision of other normal pregnancy, antepartum  Z34.80    2. Vaginal discharge  N89.8 Wet prep     Results for orders placed or performed in visit on 06/15/21   Wet prep     Status: None    Specimen: Vagina   Result Value Ref Range    Specimen Description Vagina     Wet Prep No Trichomonas seen     Wet Prep No clue cells seen     Wet Prep No yeast seen     Wet Prep WBC'S seen  Few        P:Pt treated for yeast based on symptoms and exam.   Discussed GCT/repeat RPR for next visit, handout provided, reminded of longer appointment.  Tdap next visit; reviewed CDC recommendations and partner/family vaccination recommended as well.  Need for Rhogam? No, to be done next visit   Encouraged patient to call with any questions or concerns.  Return to clinic 4 weeks    RAMSEY Carpenter, ROBERT

## 2021-06-19 DIAGNOSIS — Z11.59 ENCOUNTER FOR SCREENING FOR OTHER VIRAL DISEASES: ICD-10-CM

## 2021-06-25 ENCOUNTER — OFFICE VISIT (OUTPATIENT)
Dept: URGENT CARE | Facility: URGENT CARE | Age: 26
End: 2021-06-25
Payer: MEDICAID

## 2021-06-25 ENCOUNTER — NURSE TRIAGE (OUTPATIENT)
Dept: NURSING | Facility: CLINIC | Age: 26
End: 2021-06-25

## 2021-06-25 VITALS
TEMPERATURE: 98.3 F | RESPIRATION RATE: 20 BRPM | OXYGEN SATURATION: 99 % | DIASTOLIC BLOOD PRESSURE: 68 MMHG | HEART RATE: 85 BPM | SYSTOLIC BLOOD PRESSURE: 105 MMHG

## 2021-06-25 DIAGNOSIS — R42 DIZZINESS: Primary | ICD-10-CM

## 2021-06-25 DIAGNOSIS — Z3A.24 24 WEEKS GESTATION OF PREGNANCY: ICD-10-CM

## 2021-06-25 LAB
ERYTHROCYTE [DISTWIDTH] IN BLOOD BY AUTOMATED COUNT: 13 % (ref 10–15)
HCT VFR BLD AUTO: 32.8 % (ref 35–47)
HGB BLD-MCNC: 11.1 G/DL (ref 11.7–15.7)
MCH RBC QN AUTO: 31.6 PG (ref 26.5–33)
MCHC RBC AUTO-ENTMCNC: 33.8 G/DL (ref 31.5–36.5)
MCV RBC AUTO: 93 FL (ref 78–100)
PLATELET # BLD AUTO: 229 10E9/L (ref 150–450)
RBC # BLD AUTO: 3.51 10E12/L (ref 3.8–5.2)
WBC # BLD AUTO: 11.4 10E9/L (ref 4–11)

## 2021-06-25 PROCEDURE — 93000 ELECTROCARDIOGRAM COMPLETE: CPT | Performed by: FAMILY MEDICINE

## 2021-06-25 PROCEDURE — 82306 VITAMIN D 25 HYDROXY: CPT | Performed by: FAMILY MEDICINE

## 2021-06-25 PROCEDURE — 99204 OFFICE O/P NEW MOD 45 MIN: CPT | Performed by: FAMILY MEDICINE

## 2021-06-25 PROCEDURE — 80053 COMPREHEN METABOLIC PANEL: CPT | Performed by: FAMILY MEDICINE

## 2021-06-25 PROCEDURE — 36415 COLL VENOUS BLD VENIPUNCTURE: CPT | Performed by: FAMILY MEDICINE

## 2021-06-25 PROCEDURE — 84443 ASSAY THYROID STIM HORMONE: CPT | Performed by: FAMILY MEDICINE

## 2021-06-25 PROCEDURE — 85027 COMPLETE CBC AUTOMATED: CPT | Performed by: FAMILY MEDICINE

## 2021-06-25 PROCEDURE — 83735 ASSAY OF MAGNESIUM: CPT | Performed by: FAMILY MEDICINE

## 2021-06-25 RX ORDER — MECLIZINE HYDROCHLORIDE 25 MG/1
25 TABLET ORAL EVERY 6 HOURS PRN
Qty: 30 TABLET | Refills: 0 | Status: SHIPPED | OUTPATIENT
Start: 2021-06-25 | End: 2021-08-11

## 2021-06-25 NOTE — TELEPHONE ENCOUNTER
OB Triage Call    Reason for call: Dizziness while 24 weeks pregnant  Assessment: Dizzy, possible low iron feels like could could pass out. Drinks a lot of water.     Plan: Patient to go to urgent care for further evaluation. Advised with being pregnant need further evaluation of symptoms.     Patient plans to deliver at [facility]   Patient's primary OB Provider is Dr. Lopez    Is patient's primary OB from [place]? No- Patient's primary OB provider is a Physician.  Labor and delivery at [facility] ([phone]) notified of patient's pending arrival.  Report given to N/A.      24w6d  Estimated Date of Delivery: Oct 9, 2021        OB History    Para Term  AB Living   4 2 2 0 1 2   SAB TAB Ectopic Multiple Live Births   1 0 0 0 2      # Outcome Date GA Lbr Farooq/2nd Weight Sex Delivery Anes PTL Lv   4 Current            3 Term 17 39w0d  3.66 kg (8 lb 1.1 oz) F CS-LTranv Spinal  OSMAN      Name: Enid      Apgar1: 9  Apgar5: 9   2 Term 14 41w1d  3.515 kg (7 lb 12 oz) F CS-Unspec Spinal  OSMAN      Complications: Fetal Intolerance      Name: Miah   1 SAB  13w0d              Lab Results   Component Value Date    GBS  2017     Negative  No GBS DNA detected, presumed negative for GBS or number of bacteria may be   below the limit of detection of the assay.   Assay performed on incubated broth culture of specimen using LookTracker real-time   PCR.            Eileen Palmer RN 21 4:18 PM  [facility] Nurse Advisor    Reason for Disposition    Lightheadedness (dizziness) present now, after 2 hours of rest and fluids    Additional Information    Negative: Shock suspected (e.g., cold/pale/clammy skin, too weak to stand, low BP, rapid pulse)    Negative: Difficult to awaken or acting confused (e.g., disoriented, slurred speech)    Negative: Fainted, and still feels dizzy afterwards    Negative: Severe difficulty breathing (e.g., struggling for each breath, speaks in single  words)    Negative: Overdose (accidental or intentional) of medications    Negative: New neurologic deficit that is present now: * Weakness of the face, arm, or leg on one side of the body * Numbness of the face, arm, or leg on one side of the body * Loss of speech or garbled speech    Negative: Heart beating < 50 beats per minute OR > 140 beats per minute    Negative: Sounds like a life-threatening emergency to the triager    Negative: Chest pain    Negative: Rectal bleeding, bloody stool, or tarry-black stool    Negative: Vomiting is the main symptom    Negative: Diarrhea is the main symptom    Negative: Headache is the main symptom    Negative: Heat exhaustion suspected (i.e., dehydration from heat exposure)    Negative: Patient states that he/she is having an anxiety/panic attack    Negative: SEVERE dizziness (e.g., unable to stand, requires support to walk, feels like passing out now)    Negative: SEVERE headache or neck pain    Negative: Spinning or tilting sensation (vertigo) present now and one or more stroke risk factors (i.e., hypertension, diabetes, prior stroke/TIA, heart attack, age over 60) (Exception: prior physician evaluation for this AND no different/worse than usual)    Negative: Loss of vision or double vision    Negative: Extra heart beats OR irregular heart beating (i.e., 'palpitations')    Negative: Difficulty breathing    Negative: Drinking very little and has signs of dehydration (e.g., no urine > 12 hours, very dry mouth, very lightheaded)    Negative: Follows bleeding (e.g., stomach, rectum, vagina) (Exception: became dizzy from sight of small amount blood)    Negative: Patient sounds very sick or weak to the triager    Protocols used: DIZZINESS-A-OH

## 2021-06-25 NOTE — PROGRESS NOTES
SUBJECTIVE:   Malgorzata Ramachandran is a 26 year old female presenting with a chief complaint of dizziness, feels faint.    Has been dizzy for about 1 week, will last for 1-2 minutes and occurs 3-4 times a day.  No specific triggers.  Is drinking a lot of water.  Denies any recent URI symptoms or fever.  No one else with similar symptoms.  Had contacted triage nurse and told to come in.    Denies any LOC but feels like she will pass out.  Currently 24 wks gestation, states that pregnancy is going well, fetal movement present, no cramping or spotting.    Past Medical History:   Diagnosis Date     Abnormal Pap smear      Current Outpatient Medications   Medication Sig Dispense Refill     clotrimazole (LOTRIMIN) 1 % vaginal cream Place 1 Applicatorful vaginally At Bedtime 45 g 0     ondansetron (ZOFRAN-ODT) 4 MG ODT tab Take 1 tablet (4 mg) by mouth every 8 hours as needed for nausea 115 tablet 1     Prenatal Vit-Fe Fumarate-FA (PRENATAL VITAMIN) 27-0.8 MG TABS        metoclopramide (REGLAN) 10 MG tablet Take 1 tablet (10 mg) by mouth 4 times daily as needed (nausea) (Patient not taking: Reported on 6/25/2021) 15 tablet 0     Social History     Tobacco Use     Smoking status: Never Smoker     Smokeless tobacco: Never Used   Substance Use Topics     Alcohol use: No     Alcohol/week: 0.0 standard drinks     Frequency: Never       ROS:  Review of systems negative except as stated above.    OBJECTIVE:  /68   Pulse 85   Temp 98.3  F (36.8  C) (Tympanic)   Resp 20   LMP 01/02/2021 (Approximate)   SpO2 99%   Breastfeeding No   GENERAL APPEARANCE: healthy, alert and no distress  EYES: EOMI,  PERRL, conjunctiva clear  HENT: ear canals and TM's normal.   RESP: lungs clear to auscultation - no rales, rhonchi or wheezes  ABDOMEN:  Gravid  PSYCH: mentation appears normal and affect normal/bright    EKG - NSR, rate 83, no ST c/w ischemia, no PVCs    Results for orders placed or performed in visit on 06/25/21   CBC with  platelets     Status: Abnormal   Result Value Ref Range    WBC 11.4 (H) 4.0 - 11.0 10e9/L    RBC Count 3.51 (L) 3.8 - 5.2 10e12/L    Hemoglobin 11.1 (L) 11.7 - 15.7 g/dL    Hematocrit 32.8 (L) 35.0 - 47.0 %    MCV 93 78 - 100 fl    MCH 31.6 26.5 - 33.0 pg    MCHC 33.8 31.5 - 36.5 g/dL    RDW 13.0 10.0 - 15.0 %    Platelet Count 229 150 - 450 10e9/L       ASSESSMENT/PLAN:  (R42) Dizziness  (primary encounter diagnosis)  Plan: CBC with platelets, Comprehensive metabolic         panel (BMP + Alb, Alk Phos, ALT, AST, Total.         Bili, TP), TSH with free T4 reflex, EKG 12-lead        complete w/read - Clinics, Magnesium, Vitamin D        Deficiency, meclizine (ANTIVERT) 25 MG tablet            (Z3A.24) 24 weeks gestation of pregnancy  Plan: follow up with prenatal provider      Reviewed etiology for dizziness, possible orthostatic vs inner ear though not endorsing significant vertigo.  RX meclizine given to try for possible inner ear etiology that is causing symptoms.  Encourage to continue to push fluids.  Reviewed initial labs and slight dip in hemoglobin but should not be enough to cause this yet.  Encourage to continue with PNV and iron rich foods.  Will follow up on pending labs and notify if any abnormalities.  To L&D if any concerns pertaining to pregnancy.    Follow up with prenatal provider in 1-2 weeks  Follow up with primary provider in 1 week    Joaquin Contreras MD,  June 25, 2021 6:06 PM

## 2021-06-25 NOTE — PATIENT INSTRUCTIONS
Okay to try meclizine to help with dizziness, this works better for vertigo symptoms.    Drink lots of water    We will contact you if the pending labs are not normal      Patient Education     Understanding Dizziness, Balance Problems, and Fainting     The eyes, inner ear, joints, and muscles send signals to the brain to achieve balance.     Balance is a group effort of the eyes, inner ear, joints, and muscles. They each send signals to the brain about body position and head movement. Then the brain uses this information to achieve balance. When the brain receives conflicting signals, or when there is a problem with blood flow, dizziness or fainting can happen.   Vertigo  Vertigo is the feeling of spinning. It may happen if the brain receives conflicting balance signals. Vertigo is often caused by a problem in the inner ear. Problems include changes in inner ear structures, infection, swelling, or excess fluid. Sometimes vertigo is due to a brain problem, such as migraine, stroke, or tumor.   Dysequilibrium  Dysequilibrium is the feeling of imbalance without a sense of spinning. It may happen if the signal path between the body and brain is disrupted. There are many causes of dysequilibrium, including diabetes, anemia, head injury, and aging.   Syncope  Syncope is losing consciousness or fainting. The brain needs oxygen-rich blood to function. The heart pumps that blood to the brain. If there is a problem with the heart, blood flow (such as low blood pressure), or blood vessels, you may faint.   [employer] last reviewed this educational content on 5/1/2018 2000-2021 [employer]. All rights reserved. This information is not intended as a substitute for professional medical care. Always follow your healthcare professional's instructions.           Patient Education     Understanding Orthostatic Hypotension   Orthostatic hypotension is low blood pressure when you stand up from sitting or lying down. It  can cause symptoms for such as dizziness, lightheadedness, and blurry vision. It may also cause fainting and falls. Sitting or lying down makes the symptoms get better.   How to say it  [id number]  [id number]  What causes orthostatic hypotension?   Blood pressure is how much force with which your blood moves through your blood vessels. Hypertension means blood pressure is high. Hypotension means it s low. Orthostatic means upright posture. Many things can cause blood pressure to be too low when you stand up.    Some medicines can cause orthostatic hypotension. These include:    Blood pressure medicines    Water pills (diuretics)    Some antidepressants    Some heart medicines    Some pain, anxiety, sedative, and sleeping medicines   Other causes include:    Eating a large meal    Loss of body fluids (dehydration) from vomiting, diarrhea, or not drinking enough    Changes in blood vessels because of older age    Severe infection    High fever    Blood loss, such as bleeding from the stomach or intestines    Neurological diseases that impair the autonomic nervous system, such as Parkinson disease    Congestive heart failure    Diabetes    Alcoholism    Peripheral neuropathy   Symptoms of orthostatic hypotension   Symptoms happen when you stand up from sitting or lying down. They can also happen after standing for a long time. They can include:     Feeling lightheaded    Feeling dizzy    Weakness    Blurred vision    Tunnel vision    Pain in the back of head, neck, and shoulders    Fainting   The symptoms get better or go away when you sit or lie down.   Diagnosing orthostatic hypotension   Your healthcare provider will ask about your symptoms and health history. Make sure to tell him or her every medicine that you take. This includes over-the-counter supplements, vitamins, and herbs. Also tell your healthcare provider if you have been sick recently.    You may also have tests such as:    Blood pressure  test. Your healthcare provider will measure your blood pressure while sitting and when standing up.    Blood tests. These check for illness or other conditions that may cause the problem.    Electrocardiogram (ECG). This test looks at the electrical activity of your heart.    Treatment for orthostatic hypotension   Treatment may depend on what's causing your low blood pressure. It can include any of these:     Stopping medicines that may be causing symptoms    Standing up slowly    Not spending time in hot weather    Drinking plenty of fluids    Eating more salt    Drinking less alcohol   In some cases, your healthcare provider may prescribe a medicine to help prevent orthostatic hypotension. Talk with your healthcare providers about the risks, benefits, and possible side effects of all medicines.    Possible complications of orthostatic hypotension   The condition can cause falls, especially in older adults. Falls can lead to injury and time in the hospital. People with orthostatic hypotension may also have a higher risk for future heart problems. These include congestive heart failure and heart rhythm problems. In some cases, it can cause stroke.    Living with orthostatic hypotension   Change positions slowly from lying to standing. When getting out of bed, sit on the side of the bed with your legs down for at least 30 seconds before standing. This gives your body time to adjust to the position change.    When to call your healthcare provider   Call your healthcare provider if you have any of the following:    Dizziness, lightheadedness, or fainting    Fever of 100.4 F (38 C) or higher, or as directed by your healthcare provider    Symptoms that don t get better, or get worse    Black or red color in your stools or vomit    Diarrhea or vomiting that doesn t stop    Inability to eat or drink    Burning when you urinate    Foul-smelling urine  [employer] last reviewed this educational content on 8/1/2020 2000-2021  [employer]. All rights reserved. This information is not intended as a substitute for professional medical care. Always follow your healthcare professional's instructions.

## 2021-06-26 LAB
ALBUMIN SERPL-MCNC: 3 G/DL (ref 3.4–5)
ALP SERPL-CCNC: 91 U/L (ref 40–150)
ALT SERPL W P-5'-P-CCNC: 17 U/L (ref 0–50)
ANION GAP SERPL CALCULATED.3IONS-SCNC: 5 MMOL/L (ref 3–14)
AST SERPL W P-5'-P-CCNC: 12 U/L (ref 0–45)
BILIRUB SERPL-MCNC: 0.1 MG/DL (ref 0.2–1.3)
BUN SERPL-MCNC: 10 MG/DL (ref 7–30)
CALCIUM SERPL-MCNC: 8.9 MG/DL (ref 8.5–10.1)
CHLORIDE SERPL-SCNC: 109 MMOL/L (ref 94–109)
CO2 SERPL-SCNC: 23 MMOL/L (ref 20–32)
CREAT SERPL-MCNC: 0.64 MG/DL (ref 0.52–1.04)
GFR SERPL CREATININE-BSD FRML MDRD: >90 ML/MIN/{1.73_M2}
GLUCOSE SERPL-MCNC: 108 MG/DL (ref 70–99)
MAGNESIUM SERPL-MCNC: 1.7 MG/DL (ref 1.6–2.3)
POTASSIUM SERPL-SCNC: 3.7 MMOL/L (ref 3.4–5.3)
PROT SERPL-MCNC: 7.3 G/DL (ref 6.8–8.8)
SODIUM SERPL-SCNC: 137 MMOL/L (ref 133–144)
TSH SERPL DL<=0.005 MIU/L-ACNC: 2.23 MU/L (ref 0.4–4)

## 2021-06-27 LAB — DEPRECATED CALCIDIOL+CALCIFEROL SERPL-MC: 18 UG/L (ref 20–75)

## 2021-07-23 ENCOUNTER — PRENATAL OFFICE VISIT (OUTPATIENT)
Dept: OBGYN | Facility: CLINIC | Age: 26
End: 2021-07-23
Payer: MEDICAID

## 2021-07-23 VITALS — DIASTOLIC BLOOD PRESSURE: 52 MMHG | SYSTOLIC BLOOD PRESSURE: 114 MMHG | BODY MASS INDEX: 38.41 KG/M2 | WEIGHT: 210 LBS

## 2021-07-23 DIAGNOSIS — N89.8 VAGINAL IRRITATION: ICD-10-CM

## 2021-07-23 DIAGNOSIS — Z34.80 SUPERVISION OF OTHER NORMAL PREGNANCY, ANTEPARTUM: Primary | ICD-10-CM

## 2021-07-23 LAB
CLUE CELLS: ABNORMAL
ERYTHROCYTE [DISTWIDTH] IN BLOOD BY AUTOMATED COUNT: 12.7 % (ref 10–15)
HCT VFR BLD AUTO: 32.5 % (ref 35–47)
HGB BLD-MCNC: 10.8 G/DL (ref 11.7–15.7)
MCH RBC QN AUTO: 31.7 PG (ref 26.5–33)
MCHC RBC AUTO-ENTMCNC: 33.2 G/DL (ref 31.5–36.5)
MCV RBC AUTO: 95 FL (ref 78–100)
PLATELET # BLD AUTO: 234 10E3/UL (ref 150–450)
RBC # BLD AUTO: 3.41 10E6/UL (ref 3.8–5.2)
TRICHOMONAS, WET PREP: ABNORMAL
WBC # BLD AUTO: 9.8 10E3/UL (ref 4–11)
WBC'S/HIGH POWER FIELD, WET PREP: ABNORMAL
YEAST, WET PREP: ABNORMAL

## 2021-07-23 PROCEDURE — 87210 SMEAR WET MOUNT SALINE/INK: CPT | Performed by: ADVANCED PRACTICE MIDWIFE

## 2021-07-23 PROCEDURE — 90715 TDAP VACCINE 7 YRS/> IM: CPT | Performed by: ADVANCED PRACTICE MIDWIFE

## 2021-07-23 PROCEDURE — 85027 COMPLETE CBC AUTOMATED: CPT | Performed by: ADVANCED PRACTICE MIDWIFE

## 2021-07-23 PROCEDURE — 36415 COLL VENOUS BLD VENIPUNCTURE: CPT | Performed by: ADVANCED PRACTICE MIDWIFE

## 2021-07-23 PROCEDURE — 82952 GTT-ADDED SAMPLES: CPT | Performed by: ADVANCED PRACTICE MIDWIFE

## 2021-07-23 PROCEDURE — 86780 TREPONEMA PALLIDUM: CPT | Performed by: ADVANCED PRACTICE MIDWIFE

## 2021-07-23 PROCEDURE — 99207 PR PRENATAL VISIT: CPT | Performed by: ADVANCED PRACTICE MIDWIFE

## 2021-07-23 PROCEDURE — 90471 IMMUNIZATION ADMIN: CPT | Performed by: ADVANCED PRACTICE MIDWIFE

## 2021-07-23 NOTE — NURSING NOTE
"Chief Complaint   Patient presents with     Prenatal Care     28w 6d GCT and tdap today        Initial /52 (BP Location: Left arm, Cuff Size: Adult Regular)   Wt 95.3 kg (210 lb)   LMP 2021 (Approximate)   BMI 38.41 kg/m   Estimated body mass index is 38.41 kg/m  as calculated from the following:    Height as of 11/10/18: 1.575 m (5' 2\").    Weight as of this encounter: 95.3 kg (210 lb).  BP completed using cuff size: regular    Questioned patient about current smoking habits.  Pt. has never smoked.          The following HM Due: NONE    "

## 2021-07-23 NOTE — PROGRESS NOTES
S: Feels like she still has a yeast infection. Vagina very dry and irritated.  Baby active.  Denies uterine cramping, vaginal bleeding or leaking of fluid  O: Vitals: /52 (BP Location: Left arm, Cuff Size: Adult Regular)   Wt 95.3 kg (210 lb)   LMP 01/02/2021 (Approximate)   BMI 38.41 kg/m    BMI= Body mass index is 38.41 kg/m .  Exam:  Constitutional: healthy, alert and no distress  Respiratory: respirations even and unlabored  Gastrointestinal: Abdomen soft, non-tender. Fundus measures appropriate for gestational age. Fetal heart tones hear without difficulty and within normal limits  : Normal external genitalia without lesions  Psychiatric: mentation appears normal and affect normal/bright    Results for orders placed or performed in visit on 07/23/21   Wet prep - Clinic Collect     Status: Abnormal    Specimen: Vagina; Swab   Result Value Ref Range    Trichomonas Absent Absent    Yeast Absent Absent    Clue Cells Absent Absent    WBCs/high power field 2+ (A) None       A:     ICD-10-CM    1. Supervision of other normal pregnancy, antepartum  Z34.80 Glucose tolerance, gest screen, 1 hour     CBC with platelets     Treponema Abs w Reflex to RPR and Titer     TDAP VACCINE (Adacel, Boostrix)  [[id number]]   2. Vaginal irritation  N89.8 Wet prep - Clinic Collect     P: Patient advised no current infection.GCT/repeat RPR today, handout provided.  Tdap given; reviewed CDC recommendations and partner/family vaccination recommended as well.  Need for Rhogam? No.   Encouraged patient to call with any questions or concerns.  Return to clinic 2 weeks    RAMSEY Carpenter, ROBERT

## 2021-07-24 ENCOUNTER — TELEPHONE (OUTPATIENT)
Dept: OBGYN | Facility: CLINIC | Age: 26
End: 2021-07-24

## 2021-07-24 DIAGNOSIS — O99.013 ANEMIA IN PREGNANCY, THIRD TRIMESTER: Primary | ICD-10-CM

## 2021-07-24 DIAGNOSIS — R73.09 ABNORMAL GLUCOSE TOLERANCE TEST: ICD-10-CM

## 2021-07-24 LAB
GLUCOSE 1H P 50 G GLC PO SERPL-MCNC: 142 MG/DL (ref 70–129)
T PALLIDUM AB SER QL: NONREACTIVE

## 2021-07-24 RX ORDER — FERROUS GLUCONATE 324(38)MG
324 TABLET ORAL
Qty: 60 TABLET | Refills: 3 | Status: SHIPPED | OUTPATIENT
Start: 2021-07-24

## 2021-07-24 NOTE — TELEPHONE ENCOUNTER
Please call Malgorzata and let her know that she did not pass her one hour GCT and will need to be scheduled for a fasting 3 hour GTT. Also, her hemoglobin is a little low, and I have ordered her an iron supplement and sent to prescription to the [facility] pharmacy in [place], listed in her chart. Please advise her to take her Fe supplement with a vitamin C juice.      Thank you,    RAMSEY Carpenter, ROBERT

## 2021-07-26 NOTE — TELEPHONE ENCOUNTER
Left message on answering machine for patient to call back.  My chart message also sent.  Future order placed.  Sherry Christensen RN

## 2021-07-26 NOTE — TELEPHONE ENCOUNTER
Pt returned call. Advised of results.   Also advised to review the my chart message that was sent to her.    Rowan PLUMMER RN\

## 2021-07-30 ENCOUNTER — LAB (OUTPATIENT)
Dept: LAB | Facility: CLINIC | Age: 26
End: 2021-07-30
Payer: MEDICAID

## 2021-07-30 DIAGNOSIS — O99.013 ANEMIA IN PREGNANCY, THIRD TRIMESTER: ICD-10-CM

## 2021-07-30 DIAGNOSIS — R73.09 ABNORMAL GLUCOSE TOLERANCE TEST: ICD-10-CM

## 2021-07-30 LAB — GLUCOSE P FAST SERPL-MCNC: 86 MG/DL (ref 60–94)

## 2021-07-30 PROCEDURE — 82951 GLUCOSE TOLERANCE TEST (GTT): CPT

## 2021-07-30 PROCEDURE — 36415 COLL VENOUS BLD VENIPUNCTURE: CPT

## 2021-07-30 PROCEDURE — 82952 GTT-ADDED SAMPLES: CPT

## 2021-07-31 ENCOUNTER — TELEPHONE (OUTPATIENT)
Dept: NURSING | Facility: CLINIC | Age: 26
End: 2021-07-31

## 2021-07-31 LAB
GESTATIONAL GTT 1 HR POST DOSE: 155 MG/DL (ref 60–179)
GESTATIONAL GTT 2 HR POST DOSE: 101 MG/DL (ref 60–154)
GESTATIONAL GTT 3 HR POST DOSE: 110 MG/DL (ref 60–139)

## 2021-07-31 NOTE — TELEPHONE ENCOUNTER
OB Triage Call      Is patient's OB/Midwife with the [facility] or [facility]? [facility]- Proceed with triage     Reason for call: Lab results      Plan: Writer reviewed labs with patient.    Patient plans to deliver at [facility]                 30w0d    Estimated Date of Delivery: Oct 9, 2021        OB History    Para Term  AB Living   4 2 2 0 1 2   SAB TAB Ectopic Multiple Live Births   1 0 0 0 2      # Outcome Date GA Lbr Farooq/2nd Weight Sex Delivery Anes PTL Lv   4 Current            3 Term 17 39w0d  3.66 kg (8 lb 1.1 oz) F CS-LTranv Spinal  OSMAN      Name: Enid      Apgar1: 9  Apgar5: 9   2 Term 14 41w1d  3.515 kg (7 lb 12 oz) F CS-Unspec Spinal  OSMAN      Complications: Fetal Intolerance      Name: Miah Mirza SAB  13w0d              Lab Results   Component Value Date    GBS  2017     Negative  No GBS DNA detected, presumed negative for GBS or number of bacteria may be   below the limit of detection of the assay.   Assay performed on incubated broth culture of specimen using [employer] real-time   PCR.            Stefania Andrade RN 21 10:02 AM  [facility] Nurse Advisor    COVID 19 Nurse Triage Plan/Patient Instructions    Please be aware that novel coronavirus (COVID-19) may be circulating in the community. If you develop symptoms such as fever, cough, or SOB or if you have concerns about the presence of another infection including coronavirus (COVID-19), please contact your health care provider or visit [email or web address].     Disposition/Instructions    Home care recommended. Follow home care protocol based instructions.    Thank you for taking steps to prevent the spread of this virus.  o Limit your contact with others.  o Wear a simple mask to cover your cough.  o Wash your hands well and often.    Resources    [facility]: About COVID-19: [email or web address]    CDC: What to Do If You're Sick:  www.cdc.gov/coronavirus/2019-ncov/about/steps-when-sick.html    CDC: Ending Home Isolation: www.cdc.gov/coronavirus/2019-ncov/hcp/disposition-in-home-patients.html     CDC: Caring for Someone: www.cdc.gov/coronavirus/2019-ncov/if-you-are-sick/care-for-someone.html     [facility]: Interim Guidance for Hospital Discharge to Home: www.[email or web address]/[email or web address]/hcp/hospdischarge.pdf    [facility] clinical trials (COVID-19 research studies): clinicalaffairs.[facility]-clinical-trials     Below are the COVID-19 hotlines at the [place] Department of [facility] ([facility]). Interpreters are available.   o For health questions: Call 373-710-4696 or 1-510.999.6512 (7 a.m. to 7 p.m.)  o For questions about schools and childcare: Call 254-191-2423 or 1-633.399.3868 (7 a.m. to 7 p.m.)

## 2021-08-11 ENCOUNTER — PRENATAL OFFICE VISIT (OUTPATIENT)
Dept: OBGYN | Facility: CLINIC | Age: 26
End: 2021-08-11
Payer: MEDICAID

## 2021-08-11 VITALS — SYSTOLIC BLOOD PRESSURE: 112 MMHG | WEIGHT: 215.8 LBS | DIASTOLIC BLOOD PRESSURE: 60 MMHG | BODY MASS INDEX: 39.47 KG/M2

## 2021-08-11 DIAGNOSIS — Z34.80 SUPERVISION OF OTHER NORMAL PREGNANCY, ANTEPARTUM: Primary | ICD-10-CM

## 2021-08-11 PROCEDURE — 99207 PR PRENATAL VISIT: CPT | Performed by: OBSTETRICS & GYNECOLOGY

## 2021-08-11 RX ORDER — ONDANSETRON 4 MG/1
4 TABLET, FILM COATED ORAL EVERY 8 HOURS PRN
Qty: 10 TABLET | Refills: 1 | Status: ON HOLD | OUTPATIENT
Start: 2021-08-11 | End: 2021-10-03

## 2021-08-11 NOTE — PROGRESS NOTES
25yo  at 31w4d.  Planning a RCS and BS on 10/4.  Feels well save for some intermittent episodes of nausea.  Baby chepe active.  No ctx.  RTC 2 weeks

## 2021-08-11 NOTE — NURSING NOTE
"Chief Complaint   Patient presents with     Prenatal Care   31w4d  C/o continued nausea - would like zofran rx    initial /60   Wt 97.9 kg (215 lb 12.8 oz)   LMP 01/02/2021 (Approximate)   BMI 39.47 kg/m   Estimated body mass index is 39.47 kg/m  as calculated from the following:    Height as of 11/10/18: 1.575 m (5' 2\").    Weight as of this encounter: 97.9 kg (215 lb 12.8 oz).  BP completed using cuff size regular rodger.  Lise Saucedo CMA    "

## 2021-08-16 ENCOUNTER — TELEPHONE (OUTPATIENT)
Dept: SCHEDULING | Facility: CLINIC | Age: 26
End: 2021-08-16

## 2021-08-16 NOTE — TELEPHONE ENCOUNTER
Reason for call:  Medication   If this is a refill request, has the caller requested the refill from the pharmacy already? No  Will the patient be using a [facility]? No  Name of the pharmacy and phone number for the current request: [facility]     Name of the medication requested: ondansetron (ZOFRAN-ODT) 4 MG ODT tab    Other request: n/a    Phone number to reach patient:  Cell number on file:    Telephone Information:   Mobile [phone]       Best Time:  Anytime     Can we leave a detailed message on this number?  YES    Travel screening: Not Applicable

## 2021-08-18 ENCOUNTER — NURSE TRIAGE (OUTPATIENT)
Dept: NURSING | Facility: CLINIC | Age: 26
End: 2021-08-18

## 2021-08-18 NOTE — TELEPHONE ENCOUNTER
Patient calling. She is reporting she is 33 weeks pregnant, and had intercourse yesterday morning.  She reports she had less than a teaspoon of blood when wiping last night and once this morning. It was a pinkish color.  She reports ;  Feels pressure , a little bit  Patient denies any cramping, leakage of fluid , abdominal cramping , and baby is   Moving normall.  She was advised to have no intercourse for 1 week, and to call if any further episodes of scant bleeding.    Laura Nix RN RN  [facility] Triage/refill nurse        Reason for Disposition    Slight SPOTTING after sexual intercourse (single or brief episode)    Slight SPOTTING after a pelvic examination (single or brief episode)    Additional Information    Negative: Baby moving less today (e.g., kick count < 5 in 1 hour or < 10 in 2 hours) and 23 or more weeks pregnant    Negative: Leakage of fluid from vagina (or caller thinks she has ruptured her bag of hollis)    Negative: Abdominal pain or having contractions    Negative: MILD-MODERATE vaginal bleeding (e.g., small to medium clots; like mild menstrual period) (Exception: Single episode after sexual intercourse or pelvic exam)    Protocols used: PREGNANCY - VAGINAL BLEEDING GREATER THAN 20 WEEKS EGA-A-[place]

## 2021-08-27 ENCOUNTER — PRENATAL OFFICE VISIT (OUTPATIENT)
Dept: OBGYN | Facility: CLINIC | Age: 26
End: 2021-08-27
Payer: MEDICAID

## 2021-08-27 ENCOUNTER — NURSE TRIAGE (OUTPATIENT)
Dept: NURSING | Facility: CLINIC | Age: 26
End: 2021-08-27

## 2021-08-27 VITALS
WEIGHT: 217 LBS | DIASTOLIC BLOOD PRESSURE: 64 MMHG | SYSTOLIC BLOOD PRESSURE: 110 MMHG | BODY MASS INDEX: 39.93 KG/M2 | HEIGHT: 62 IN

## 2021-08-27 DIAGNOSIS — Z34.80 SUPERVISION OF OTHER NORMAL PREGNANCY, ANTEPARTUM: Primary | ICD-10-CM

## 2021-08-27 DIAGNOSIS — Z98.891 PREVIOUS CESAREAN SECTION: ICD-10-CM

## 2021-08-27 PROCEDURE — 99207 PR PRENATAL VISIT: CPT | Performed by: OBSTETRICS & GYNECOLOGY

## 2021-08-27 PROCEDURE — 59425 ANTEPARTUM CARE ONLY: CPT | Performed by: OBSTETRICS & GYNECOLOGY

## 2021-08-27 ASSESSMENT — MIFFLIN-ST. JEOR: SCORE: 1677.56

## 2021-08-27 NOTE — NURSING NOTE
"Chief Complaint   Patient presents with     Prenatal Care     33 6/7 weeks       Initial /64 (BP Location: Left arm, Patient Position: Chair, Cuff Size: Adult Large)   Ht 1.575 m (5' 2\")   Wt 98.4 kg (217 lb)   LMP 2021 (Approximate)   Breastfeeding No   BMI 39.69 kg/m   Estimated body mass index is 39.69 kg/m  as calculated from the following:    Height as of this encounter: 1.575 m (5' 2\").    Weight as of this encounter: 98.4 kg (217 lb).  BP completed using cuff size: large    Questioned patient about current smoking habits.  Pt. has never smoked.          The following HM Due: NONE    +fetal movement  +swelling in feet and hands  "

## 2021-08-27 NOTE — TELEPHONE ENCOUNTER
Rquesting refill of Zofran   review of  EMR reveals that cristino had ordr placed on 08/11 with one refill   Patient sattes that she got the medicine but it was not Zofran and was different from  first RX so she only has taken one and it made her sleepy   Caller is advised that   medication  Is generic for Zofran and only difference is that  this was not the  quick dissolve.   Caller understands    Advised to call back with further  questions   Caterina Hernandez RN  FNA       Additional Information    Caller has medication question only, adult not sick, and triager answers question    Protocols used: MEDICATION QUESTION CALL-A-[place]

## 2021-08-27 NOTE — PROGRESS NOTES
26 year old  at 33w6d     AB+/RI.  No NIPT.  GIRL [place]  H/o CS x2, planning RLTCS + BTL, has FTP, scheduled for 10/4  Recommend covid vax  Reviewed s/s PTL    RTC 2 wks for GBS and cvx chk     Rhona Lopez MD, MPH  [facility] OB/Gyn

## 2021-09-07 ENCOUNTER — HOSPITAL ENCOUNTER (EMERGENCY)
Facility: CLINIC | Age: 26
End: 2021-09-07
Payer: MEDICAID

## 2021-09-07 ENCOUNTER — HOSPITAL ENCOUNTER (EMERGENCY)
Facility: CLINIC | Age: 26
Discharge: HOME | End: 2021-09-07
Attending: EMERGENCY MEDICINE | Admitting: EMERGENCY MEDICINE
Payer: COMMERCIAL

## 2021-09-07 ENCOUNTER — HOSPITAL ENCOUNTER (OUTPATIENT)
Facility: CLINIC | Age: 26
Discharge: HOME | End: 2021-09-07
Attending: OBSTETRICS & GYNECOLOGY | Admitting: OBSTETRICS & GYNECOLOGY
Payer: COMMERCIAL

## 2021-09-07 VITALS
OXYGEN SATURATION: 98 % | SYSTOLIC BLOOD PRESSURE: 127 MMHG | RESPIRATION RATE: 18 BRPM | HEART RATE: 87 BPM | TEMPERATURE: 98.2 F | DIASTOLIC BLOOD PRESSURE: 70 MMHG

## 2021-09-07 VITALS
HEART RATE: 86 BPM | TEMPERATURE: 98.1 F | OXYGEN SATURATION: 99 % | SYSTOLIC BLOOD PRESSURE: 126 MMHG | RESPIRATION RATE: 18 BRPM | DIASTOLIC BLOOD PRESSURE: 69 MMHG

## 2021-09-07 DIAGNOSIS — Z30.2 REQUEST FOR STERILIZATION: ICD-10-CM

## 2021-09-07 DIAGNOSIS — R20.2 PARESTHESIA: ICD-10-CM

## 2021-09-07 DIAGNOSIS — F51.4 NIGHT TERROR: ICD-10-CM

## 2021-09-07 DIAGNOSIS — Z98.891 PREVIOUS CESAREAN SECTION: ICD-10-CM

## 2021-09-07 DIAGNOSIS — R07.89 ATYPICAL CHEST PAIN: ICD-10-CM

## 2021-09-07 LAB
ANION GAP SERPL CALCULATED.3IONS-SCNC: 5 MMOL/L (ref 3–14)
B-HCG SERPL-ACNC: ABNORMAL IU/L (ref 0–5)
BASOPHILS # BLD AUTO: 0 10E3/UL (ref 0–0.2)
BASOPHILS NFR BLD AUTO: 0 %
BUN SERPL-MCNC: 9 MG/DL (ref 7–30)
CALCIUM SERPL-MCNC: 9 MG/DL (ref 8.5–10.1)
CHLORIDE BLD-SCNC: 110 MMOL/L (ref 94–109)
CO2 SERPL-SCNC: 22 MMOL/L (ref 20–32)
CREAT SERPL-MCNC: 0.56 MG/DL (ref 0.52–1.04)
EOSINOPHIL # BLD AUTO: 0.1 10E3/UL (ref 0–0.7)
EOSINOPHIL NFR BLD AUTO: 0 %
ERYTHROCYTE [DISTWIDTH] IN BLOOD BY AUTOMATED COUNT: 12.6 % (ref 10–15)
GFR SERPL CREATININE-BSD FRML MDRD: >90 ML/MIN/1.73M2
GLUCOSE BLD-MCNC: 98 MG/DL (ref 70–99)
HCT VFR BLD AUTO: 31.4 % (ref 35–47)
HGB BLD-MCNC: 10.5 G/DL (ref 11.7–15.7)
HOLD SPECIMEN: NORMAL
IMM GRANULOCYTES # BLD: 0 10E3/UL
IMM GRANULOCYTES NFR BLD: 0 %
LYMPHOCYTES # BLD AUTO: 1.6 10E3/UL (ref 0.8–5.3)
LYMPHOCYTES NFR BLD AUTO: 14 %
MCH RBC QN AUTO: 31 PG (ref 26.5–33)
MCHC RBC AUTO-ENTMCNC: 33.4 G/DL (ref 31.5–36.5)
MCV RBC AUTO: 93 FL (ref 78–100)
MONOCYTES # BLD AUTO: 0.9 10E3/UL (ref 0–1.3)
MONOCYTES NFR BLD AUTO: 8 %
NEUTROPHILS # BLD AUTO: 8.7 10E3/UL (ref 1.6–8.3)
NEUTROPHILS NFR BLD AUTO: 78 %
NRBC # BLD AUTO: 0 10E3/UL
NRBC BLD AUTO-RTO: 0 /100
PLATELET # BLD AUTO: 191 10E3/UL (ref 150–450)
POTASSIUM BLD-SCNC: 3.8 MMOL/L (ref 3.4–5.3)
RBC # BLD AUTO: 3.39 10E6/UL (ref 3.8–5.2)
SODIUM SERPL-SCNC: 137 MMOL/L (ref 133–144)
TROPONIN I SERPL-MCNC: <0.015 UG/L (ref 0–0.04)
WBC # BLD AUTO: 11.4 10E3/UL (ref 4–11)

## 2021-09-07 PROCEDURE — 99284 EMERGENCY DEPT VISIT MOD MDM: CPT

## 2021-09-07 PROCEDURE — 84484 ASSAY OF TROPONIN QUANT: CPT | Performed by: EMERGENCY MEDICINE

## 2021-09-07 PROCEDURE — 36415 COLL VENOUS BLD VENIPUNCTURE: CPT | Performed by: EMERGENCY MEDICINE

## 2021-09-07 PROCEDURE — 85025 COMPLETE CBC W/AUTO DIFF WBC: CPT | Performed by: EMERGENCY MEDICINE

## 2021-09-07 PROCEDURE — 80048 BASIC METABOLIC PNL TOTAL CA: CPT | Performed by: EMERGENCY MEDICINE

## 2021-09-07 PROCEDURE — 84702 CHORIONIC GONADOTROPIN TEST: CPT | Performed by: EMERGENCY MEDICINE

## 2021-09-07 PROCEDURE — G0463 HOSPITAL OUTPT CLINIC VISIT: HCPCS

## 2021-09-07 PROCEDURE — 120N000001 HC R&B MED SURG/OB

## 2021-09-07 NOTE — ED PROVIDER NOTES
Visit Date: 2021    CHIEF COMPLAINT:  Paresthesias.    HISTORY OF PRESENT ILLNESS:  This is a 26-year-old female, who is 35 weeks' pregnant, who was just sent down to Labor and Delivery after checking for  labor, which was a negative and reassuring evaluation.  Story goes that she felt fine today, went to bed, had a state where she was sleeping, felt that she could not move, although was awake for a time.  She states that this was a dream-like state at which point she woke up panicked, felt that her baby was not kicking and had tightness on her midchest and paresthesias going down her left upper and left lower extremity.  She denied any weakness.  This resolved within a half an hour of her event.  She was told to come to be evaluated.  Now she denies any chest pain, denies any paresthesias, denies any weakness or numbness.  No shortness of breath and states that she feels improved.  Also states that symptoms all happened at the same time upon her awakening today.  Now feels that her event is resolved.    ALLERGIES:  NO KNOWN DRUG ALLERGIES.    MEDICATIONS:  Zofran.    PAST MEDICAL HISTORY:  Rubella.    SOCIAL HISTORY:  The patient denies alcohol or drug use.    REVIEW OF SYSTEMS:    CONSTITUTIONAL:  Negative for fever.    All other review of systems are negative.    PHYSICAL EXAMINATION:    VITAL SIGNS:  Blood pressure 126/69, temperature 98.1 degrees Fahrenheit, pulse 86, respiratory rate 18, pulse ox 99% on room air.  GENERAL:  The patient is well-appearing.   ORAL:  Moist mucous membranes.  NECK:  Supple.  HEART:  S1, S2.  Regular rate and rhythm.  No murmurs, rubs, or gallops.  LUNGS:  Clear to auscultation bilaterally.  No wheeze, rales, or rhonchi.  ABDOMEN:  Gravid uterus.  Nontender.  No guarding or rebound.  MUSCULOSKELETAL:  2+ distal pulses.  No leg/calf tenderness or edema.  NEUROLOGIC:  The patient is awake, alert, oriented x 3.  Cranial nerves are grossly intact.  She has 5/5 strength.   Sensation intact to light touch in all 4 extremities.    DERMATOLOGIC:  Non-pallor.    LABORATORY DATA:    EKG:  Normal sinus rhythm, rate 65, no acute ischemic changes.  QTc is 445.  Interpretation:  Normal EKG; interpreted by Dr. Haji at 2:58 a.m.  LABORATORY:  BMP is within normal limits.  HCG quantitative 21,897.  Troponin is less than 0.015, glucose 98.  CBC:  WBC 11.4, otherwise within normal limits.    EMERGENCY DEPARTMENT COURSE AND TREATMENT:  The patient was seen by ED physician and ED nurse.  All findings were reviewed.  All questions were answered.  The patient was noted to be feeling improved with the results and was discharged home.      MEDICAL DECISION-MAKING:  This 26-year-old female came in 35 weeks' pregnant with event of sudden terror upon awakening, followed by feeling that she could not feel her baby moving with chest tightness and paresthesias.  Her symptoms are currently resolved after the event.  She was checked by Labor and Delivery.  Was noted not to be in  labor and was sent down for further evaluation.  Fortunately, her EKG laboratory work is reassuring.  Her symptoms are fully resolved.  She has no weakness, numbness on exam.  I would highly doubt stroke or TIA in regards to her chest tightness.  This was all along the time of the event.  She has no ischemic changes on her EKG.  She is not hypoxic.  She is not tachycardic.  I would highly doubt ACS or pulmonary embolism.  My suspicion is for night terror that induced a panic attack, although that remains to be seen.  She has resolved symptoms.  I do not believe it is in her interest for further evaluation with imaging, whether for stroke, TIA or for pulmonary embolism.  At this time, she is otherwise appropriate for outpatient management.  She is told to follow up with her OB/GYN.  Return for any worsening symptoms or concerns.    DISPOSITION:  Home.     FOLLOWUP:  With OB/GYN.    DIAGNOSES:   1.  Night terror.  2.   Paresthesias.  3.  Typical chest pain.    Eulalio Haji MD        D: 2021   T: 2021   MT: ANA    Name:     WILLAM YANGRosi  MRN:      [id number]        Account:    [id number]   :      1995           Visit Date: 2021     Document: [id number]

## 2021-09-07 NOTE — LETTER
September 7, 2021      To Whom It May Concern:      Malgorzata Ramachandran was seen in our Emergency Department today, 09/07/21.  She may return to work/school on 09/09/2021.    Sincerely,        Jc Soria RN

## 2021-09-07 NOTE — ED TRIAGE NOTES
Here for chest pressure and numbness from left shoulder radiating down to arms/toes/back. Is currently 35 weeks pregnant, went to OB who clear patient, sent back to ED for further eval. ABCs intact.

## 2021-09-07 NOTE — PLAN OF CARE
ED charge nurse called and advised of patient being transferred from OB to ED.  Brief SBAR given.  Per charge nurse - ok to bring pt directly to triage.      Patient transported to ED via wheelchair and brought directly to triage.  Report given to triage RN.

## 2021-09-07 NOTE — PROVIDER NOTIFICATION
"   21 0220   Provider Notification   Provider Name/Title Dr. Pena   Method of Notification Phone   Request Evaluate-Remote   Notification Reason Other       Call to Dr. Pena    1) advised of patient arrival, , 2 previous  c-sections, currently 35.3 weeks.  She presents for non obstetrical reasons.  Per patient account:   She started not feeling well at approximately 2200 last night with report of tingling / numbness on her left side.  She went to bed hoping to feel better but woke up feeling \"like something isn't right\", so she presented to ED.   She feels like the numbness/tingling increases during episodes where she \"feels like there is pressure in her heart\".   D/t pregnancy she was sent to OB prior to ED workup.     2) Data collected:        Category I tracing / One contraction      VSS with BP's all 120's/69-70; Pulse WNL, afebrile, O2 sat continuous monitoring 98-99%      RRR heart rate w/extended auscultation; LS clear/equal      Extremities normal color, warm to touch.         Capillary refill <3 seconds on fingers / bilateral.  Unable to assess  Toenails  D/t polish.      Hand grasp on left hand noted to be weaker than right hand grasp.      When patellar reflexes and clonus assessed patient stated \"I definitely could feel what you were doing on my right leg more than on my left leg\".       She denies:  HA, visual  Change, upper gastric pain,  Leaking of vaginal fluid, vaginal  Bleeding,         Also denies hx reflux,  GI  Issues, back or neck injury,  Recent lifting.             Per Dr. Pena - patient cleared obstetrically.  Patient should be brought to ED for workup.                "

## 2021-09-07 NOTE — DISCHARGE INSTRUCTIONS
Discharge Instruction for Undelivered Patients      You were seen for: dizziness and left sided numbness/tingling.  We Consulted: Dr. Pena  You had (Test or Medicine):uterine and fetal monitoring, general assessment, transfer to ED       Call your provider if you notice:  Swelling in your face or increased swelling in your hands or legs.  Headaches that are not relieved by Tylenol (acetaminophen).  Changes in your vision (blurring: seeing spots or stars.)  Nausea (sick to your stomach) and vomiting (throwing up).   Weight gain of 5 pounds or more per week.  Heartburn that doesn't go away.  Signs of bladder infection: pain when you urinate (use the toilet), need to go more often and more urgently.  The bag of hollis (rupture of membranes) breaks, or you notice leaking in your underwear.  Bright red blood in your underwear.  Abdominal (lower belly) or stomach pain.  For first baby: Contractions (tightening) less than 5 minutes apart for one hour or more.  Second (plus) baby: Contractions (tightening) less than 10 minutes apart and getting stronger.      Per Dr Pena - patient cleared in OB.  Transfer to ED for further evaluation.

## 2021-09-08 LAB
ATRIAL RATE - MUSE: 65 BPM
DIASTOLIC BLOOD PRESSURE - MUSE: NORMAL MMHG
INTERPRETATION ECG - MUSE: NORMAL
P AXIS - MUSE: 27 DEGREES
PR INTERVAL - MUSE: 142 MS
QRS DURATION - MUSE: 96 MS
QT - MUSE: 428 MS
QTC - MUSE: 445 MS
R AXIS - MUSE: 65 DEGREES
SYSTOLIC BLOOD PRESSURE - MUSE: NORMAL MMHG
T AXIS - MUSE: 24 DEGREES
VENTRICULAR RATE- MUSE: 65 BPM

## 2021-09-13 ENCOUNTER — PRENATAL OFFICE VISIT (OUTPATIENT)
Dept: OBGYN | Facility: CLINIC | Age: 26
End: 2021-09-13
Payer: COMMERCIAL

## 2021-09-13 VITALS — BODY MASS INDEX: 40.79 KG/M2 | WEIGHT: 223 LBS | SYSTOLIC BLOOD PRESSURE: 120 MMHG | DIASTOLIC BLOOD PRESSURE: 78 MMHG

## 2021-09-13 DIAGNOSIS — Z36.85 SCREENING, ANTENATAL, FOR STREPTOCOCCUS B: Primary | ICD-10-CM

## 2021-09-13 PROCEDURE — 87653 STREP B DNA AMP PROBE: CPT | Performed by: OBSTETRICS & GYNECOLOGY

## 2021-09-13 PROCEDURE — 99212 OFFICE O/P EST SF 10 MIN: CPT | Performed by: OBSTETRICS & GYNECOLOGY

## 2021-09-13 NOTE — PROGRESS NOTES
26 year old  at 36w2d     AB+/RI.  No NIPT.  GIRL [place].  GBS and cvx chk today.  H/o CS x2, planning RLTCS + BTL, has FTP, scheduled for 10/4  BMI 40 - lovenox postpartum    RTC weekly until delivery     Rhona Lopez MD, MPH  [facility] OB/Gyn

## 2021-09-13 NOTE — NURSING NOTE
"Chief Complaint   Patient presents with     Prenatal Care       Initial /78   Wt 101.2 kg (223 lb)   LMP 2021 (Approximate)   Breastfeeding No   BMI 40.79 kg/m   Estimated body mass index is 40.79 kg/m  as calculated from the following:    Height as of 21: 1.575 m (5' 2\").    Weight as of this encounter: 101.2 kg (223 lb).  BP completed using cuff size: regular    Questioned patient about current smoking habits.  Pt. has never smoked.          36w2d  + FM daily  + dania maria contractions  - bleeding  + nl discharge  + heartburn  - headache  Geeta Rush LPN               "

## 2021-09-14 LAB
GP B STREP DNA SPEC QL NAA+PROBE: POSITIVE
PATIENT PENICILLIN, AMOXICILLIN, CEPHALOSPORINS ALLERGY: NO

## 2021-09-16 PROBLEM — O99.820 GBS (GROUP B STREPTOCOCCUS CARRIER), +RV CULTURE, CURRENTLY PREGNANT: Status: ACTIVE | Noted: 2021-09-16

## 2021-09-19 ENCOUNTER — NURSE TRIAGE (OUTPATIENT)
Dept: NURSING | Facility: CLINIC | Age: 26
End: 2021-09-19

## 2021-09-19 NOTE — TELEPHONE ENCOUNTER
37 wks, 1 day pregnant, bad cold and thinks she has strep. Sore throat started yesterday.  I reviewed the OB list of treatments of pushing fluids, eat soft foods, take Tylenol for throat pain, throat lozenges that do not contain zinc. Also advised, if sore throat lasts longer than 48 hours, she should be seen for them to do a strep test. She has no further questions at this time.  OB Triage Call      Is patient's OB/Midwife with the [facility] or [facility]? [facility]- Proceed with triage     Reason for call: sore throat    Assessment: sore throat    Plan: treat at home and seek care if it lasts > 48 hours.    Patient plans to deliver at [facility]     Patient's primary OB Provider is Dr Lopez.      Per protocol recommendations Patient to follow home care recommendations. An FYI page or consult is not needed unless patient is requesting to speak to midwife or MD, they are in labor, or it is recommended by triage protocol    Is patient's delivering hospital on divert? No      37w1d    Estimated Date of Delivery: Oct 9, 2021        OB History    Para Term  AB Living   4 2 2 0 1 2   SAB TAB Ectopic Multiple Live Births   1 0 0 0 2      # Outcome Date GA Lbr Farooq/2nd Weight Sex Delivery Anes PTL Lv   4 Current            3 Term 17 39w0d  3.66 kg (8 lb 1.1 oz) F CS-LTranv Spinal  OSMAN      Name: Enid      Apgar1: 9  Apgar5: 9   2 Term 14 41w1d  3.515 kg (7 lb 12 oz) F CS-Unspec Spinal  OSMAN      Complications: Fetal Intolerance      Name: Miah   1 SAB  13w0d              Lab Results   Component Value Date    GBS  2017     Negative  No GBS DNA detected, presumed negative for GBS or number of bacteria may be   below the limit of detection of the assay.   Assay performed on incubated broth culture of specimen using [employer] real-time   PCR.            Dianne Veras RN 21 8:59 AM  [facility] Nurse Advisor    Dianne Veras RN  [facility]       Reason for  "Disposition    [1] Sore throat is the only symptom AND [2] sore throat present < 48 hours    Additional Information    Negative: Severe difficulty breathing (e.g., struggling for each breath, speaks in single words, stridor)    Negative: Sounds like a life-threatening emergency to the triager    Negative: [1] Diagnosed strep throat AND [2] taking antibiotic AND [3] symptoms continue    Negative: Throat culture results, call about    Negative: Productive cough is main symptom    Negative: Non-productive cough is main symptom    Negative: Hoarseness is main symptom    Negative: Runny nose is main symptom    Negative: [1] Drooling or spitting out saliva (because can't swallow) AND [2] normal breathing    Negative: Unable to open mouth completely    Negative: [1] Difficulty breathing AND [2] not severe    Negative: Fever > 104 F (40 C)    Negative: [1] Refuses to drink anything AND [2] for > 12 hours    Negative: [1] Drinking very little AND [2] dehydration suspected (e.g., no urine > 12 hours, very dry mouth, very lightheaded)    Negative: Patient sounds very sick or weak to the triager    Negative: SEVERE (e.g., excruciating) throat pain     Pain at 5    Negative: [1] Pus on tonsils (back of throat) AND [2]  fever AND [3] swollen neck lymph nodes (\"glands\")    Negative: [1] Rash AND [2] widespread (especially chest and abdomen)    Negative: Earache also present    Negative: Fever present > 3 days (72 hours)    Negative: Diabetes mellitus or weak immune system (e.g., HIV positive, cancer chemo, splenectomy, organ transplant, chronic steroids)    Negative: History of rheumatic fever    Negative: [1] Adult is leaving on a trip AND [2] requests an antibiotic NOW    Negative: [1] Positive throat culture or rapid strep test (according to lab, PCP, caller, etc.) AND [2] NO  standing order to call in prescription for antibiotic    Negative: [1] Exposure to family member (or spouse or boyfriend/girlfriend) with test-proven " strep AND [2] within last 10 days    Negative: [1] Sore throat is the only symptom AND [2] present > 48 hours    Negative: [1] Sore throat with cough/cold symptoms AND [2] present > 5 days    Protocols used: SORE THROAT-MARIANA-KARY Veras RN  [place] [facility]

## 2021-09-21 ENCOUNTER — PRENATAL OFFICE VISIT (OUTPATIENT)
Dept: OBGYN | Facility: CLINIC | Age: 26
End: 2021-09-21
Payer: COMMERCIAL

## 2021-09-21 VITALS
WEIGHT: 222 LBS | HEIGHT: 62 IN | SYSTOLIC BLOOD PRESSURE: 118 MMHG | DIASTOLIC BLOOD PRESSURE: 60 MMHG | BODY MASS INDEX: 40.85 KG/M2

## 2021-09-21 DIAGNOSIS — Z36.85 SCREENING, ANTENATAL, FOR STREPTOCOCCUS B: Primary | ICD-10-CM

## 2021-09-21 PROCEDURE — 99212 OFFICE O/P EST SF 10 MIN: CPT | Performed by: OBSTETRICS & GYNECOLOGY

## 2021-09-21 ASSESSMENT — MIFFLIN-ST. JEOR: SCORE: 1700.24

## 2021-09-21 NOTE — NURSING NOTE
"Chief Complaint   Patient presents with     Prenatal Care     37 3/7 WEEKS       Initial /60 (BP Location: Right arm, Patient Position: Chair, Cuff Size: Adult Large)   Ht 1.575 m (5' 2\")   Wt 100.7 kg (222 lb)   LMP 2021 (Approximate)   Breastfeeding Unknown   BMI 40.60 kg/m   Estimated body mass index is 40.6 kg/m  as calculated from the following:    Height as of this encounter: 1.575 m (5' 2\").    Weight as of this encounter: 100.7 kg (222 lb).  BP completed using cuff size: large    Questioned patient about current smoking habits.  Pt. has never smoked.          The following HM Due: NONE    +fetal movement  +swelling  tested for covid yesterday  "

## 2021-09-21 NOTE — PROGRESS NOTES
26 year old  at 37w3d     Has a cold - got a covid test yesterday, still pending result.  Wondering if she needs to repeat the covid swab on 10/1 before her CS.   AB+/RI.  No NIPT.  GIRL [place].  GBS pos.    H/o CS x2, planning RLTCS + BTL, has FTP, scheduled for 10/4.  RTC 1 week for pre op.  Needs CS delivery packet as well (can give at that visit).   BMI 40 - lovenox postpartum    RTC 1 wk     Rhona Lopez MD, MPH  [facility] OB/Gyn

## 2021-09-22 ENCOUNTER — ANESTHESIA EVENT (OUTPATIENT)
Dept: OBGYN | Facility: CLINIC | Age: 26
End: 2021-09-22
Payer: COMMERCIAL

## 2021-09-22 ENCOUNTER — NURSE TRIAGE (OUTPATIENT)
Dept: NURSING | Facility: CLINIC | Age: 26
End: 2021-09-22

## 2021-09-22 ENCOUNTER — HOSPITAL ENCOUNTER (OUTPATIENT)
Facility: CLINIC | Age: 26
LOS: 1 days | Discharge: HOME | End: 2021-09-22
Attending: OBSTETRICS & GYNECOLOGY | Admitting: OBSTETRICS & GYNECOLOGY
Payer: COMMERCIAL

## 2021-09-22 VITALS — RESPIRATION RATE: 18 BRPM | SYSTOLIC BLOOD PRESSURE: 132 MMHG | DIASTOLIC BLOOD PRESSURE: 69 MMHG | TEMPERATURE: 99 F

## 2021-09-22 DIAGNOSIS — Z36.89 ENCOUNTER FOR TRIAGE IN PREGNANT PATIENT: Primary | ICD-10-CM

## 2021-09-22 LAB
ALBUMIN UR-MCNC: NEGATIVE MG/DL
APPEARANCE UR: CLEAR
BILIRUB UR QL STRIP: NEGATIVE
COLOR UR AUTO: NORMAL
CRYSTALS AMN MICRO: NORMAL
GLUCOSE UR STRIP-MCNC: NEGATIVE MG/DL
HGB UR QL STRIP: NEGATIVE
KETONES UR STRIP-MCNC: NEGATIVE MG/DL
LEUKOCYTE ESTERASE UR QL STRIP: NEGATIVE
NITRATE UR QL: NEGATIVE
PH UR STRIP: 6.5 [PH] (ref 5–7)
RUPTURE OF FETAL MEMBRANES BY ROM PLUS: NEGATIVE
SP GR UR STRIP: 1.02 (ref 1–1.03)
UROBILINOGEN UR STRIP-MCNC: NORMAL MG/DL

## 2021-09-22 PROCEDURE — 84112 EVAL AMNIOTIC FLUID PROTEIN: CPT | Performed by: OBSTETRICS & GYNECOLOGY

## 2021-09-22 PROCEDURE — G0463 HOSPITAL OUTPT CLINIC VISIT: HCPCS

## 2021-09-22 PROCEDURE — 81003 URINALYSIS AUTO W/O SCOPE: CPT | Performed by: OBSTETRICS & GYNECOLOGY

## 2021-09-22 RX ORDER — LIDOCAINE 40 MG/G
CREAM TOPICAL
Status: DISCONTINUED | OUTPATIENT
Start: 2021-09-22 | End: 2021-09-22 | Stop reason: HOSPADM

## 2021-09-22 NOTE — PROVIDER NOTIFICATION
21 0641   Provider Notification   Provider Name/Title Terrance   Method of Notification Phone   Request Evaluate - Remote   Notification Reason Patient Arrived;Status Update     OB updated  37.4 here for r/o rupture. ROMPlus sent. Order received to send seema

## 2021-09-22 NOTE — DISCHARGE INSTRUCTIONS
Discharge Instruction for Undelivered Patients      You were seen for: rule out rupture of membranes  We Consulted: Dr VIDA Pena  You had (Test or Medicine): ROM+ negative, Fern test negative, UA negative    Diet:   Regular diet drink 6-8 glasses of water per day     Activity:  Usual activities    Call your provider if you notice:  Swelling in your face or increased swelling in your hands or legs.  Headaches that are not relieved by Tylenol (acetaminophen).  Changes in your vision (blurring: seeing spots or stars.)  Nausea (sick to your stomach) and vomiting (throwing up).   Weight gain of 5 pounds or more per week.  Heartburn that doesn't go away.  Signs of bladder infection: pain when you urinate (use the toilet), need to go more often and more urgently.  The bag of hollis (rupture of membranes) breaks, or you notice leaking in your underwear.  Bright red blood in your underwear.  Abdominal (lower belly) or stomach pain.: Contractions (tightening) less than 10 minutes apart and getting stronger.  Increase or change in vaginal discharge (note the color and amount)      Follow-up:   next week call # [phone] to get a time to schedule your appointment      Mary Lou was seen at [facility] in Labor and Delivery for monitoring on Wednesday morning 09/22/2021 under the care of Dr Junior Pena was discharged to home

## 2021-09-22 NOTE — TELEPHONE ENCOUNTER
Feeling some pressure in the groin. 37 wk, 4 days. Went to pee, had cramping and pressure in the front. Went to bathroom, water broke.    OB Triage Call      Is patient's OB/Midwife with the [facility] or [facility]? [facility]- Proceed with triage     Reason for call: vaginal fluid discharge     Assessment: vaginal discharg    Plan: vaginal discharg    Patient plans to deliver at [facility]     Patient's primary OB Provider is Jessica.      Per protocol recommendations Patient to be evaluated in L&D. Patient's primary OB is Ronnie Physician.  Labor and delivery at [facility] ([phone]) notified of patient's pending arrival.  Report given to Mary Lou.    Is patient's delivering hospital on divert? No      37w4d    Estimated Date of Delivery: Oct 9, 2021        OB History    Para Term  AB Living   4 2 2 0 1 2   SAB TAB Ectopic Multiple Live Births   1 0 0 0 2      # Outcome Date GA Lbr Farooq/2nd Weight Sex Delivery Anes PTL Lv   4 Current            3 Term 17 39w0d  3.66 kg (8 lb 1.1 oz) F CS-LTranv Spinal  OSMAN      Name: Enid      Apgar1: 9  Apgar5: 9   2 Term 14 41w1d  3.515 kg (7 lb 12 oz) F CS-Unspec Spinal  OSMAN      Complications: Fetal Intolerance      Name: Miah   1 SAB  13w0d              Lab Results   Component Value Date    GBS  2017     Negative  No GBS DNA detected, presumed negative for GBS or number of bacteria may be   below the limit of detection of the assay.   Assay performed on incubated broth culture of specimen using [employer] real-time   PCR.            Dianne Veras RN 21 5:32 AM  [facility] Nurse Advisor    Reason for Disposition    Leakage of fluid from vagina    Additional Information    Negative: [1] SEVERE abdominal pain (e.g., excruciating) AND [2] constant AND [3] present > 1 hour    Negative: Severe bleeding (e.g., continuous red blood from vagina, or large blood clots)    Negative: Umbilical cord hanging out of the vagina (shiny, white,  "curled appearance, \"like telephone cord\")    Negative: Can see baby    Negative: Uncontrollable urge to push (i.e., feels like baby is coming out now)    Negative: Sounds like a life-threatening emergency to the triager    Negative: < 20 weeks pregnant    Negative: Vaginal bleeding    Protocols used: PREGNANCY - RUPTURE OF [id number]    I gave report to L & D RN and spoke with patient that she should go in to be seen there. She said she's supposed to be a c section.  Dianne Veras RN  [facility]    "

## 2021-09-22 NOTE — PLAN OF CARE
Ok to discharge all printed out discharge instructions verbally reviewed with patient and she agrees to understanding, dressed and ambulatory to ER door exited entered family car destination home in good spirits

## 2021-09-28 ENCOUNTER — NURSE TRIAGE (OUTPATIENT)
Dept: NURSING | Facility: CLINIC | Age: 26
End: 2021-09-28

## 2021-09-28 DIAGNOSIS — Z98.891 S/P CESAREAN SECTION: ICD-10-CM

## 2021-09-28 DIAGNOSIS — Z01.812 PRE-OPERATIVE LABORATORY EXAMINATION: Primary | ICD-10-CM

## 2021-09-29 NOTE — TELEPHONE ENCOUNTER
38 wks 3 days pregnant She thinks she lost her mucous plug. She states she passed a big glob of yellowish mucous, and it looked like what she saw on the internet was a mucous plug.   This is her 3rd baby. She has been having contractions: they are stronger today and she can feel them in her back, Contractions are approx 10-13 minutes apart. She is scheduled for a repeat CSection on Monday. Patient wonders if she should come in now, that Dr did not want her to go through contractions like she did with her 2nd baby ?   Baby is less active today, still moving but not as much. Patient has not done a kick count yet, was instructed to do so.   Dr Lopez @ [facility]. Next appointment with another provider on Thursday (Dr Lopez was booked)..   Triaged to a disposition of See PCP within 3 days.   Patient requested neo TOLENTINO to be consulted.   Dr Jessica larson paged @ 9:06pm: Patient may be in false labor, have her drink water and lie on her side. If the contractions decrease then it is false labor and she should wait to come in. If she feels she is in true labor or if she is concerned that something is wrong, then she should come into L&D to be evaluated. Baby may not feel as active if mother is having contractions.     Contacted patient with this information, she verbalized understanding. (She will call back later after following MDs instructions.)     Katharine Pierre RN Triage Nurse Advisor 9:23 PM 2021    Additional Information    Negative: [1] Vaginal bleeding AND [2] pregnant > 20 weeks    Negative: [1] Vaginal bleeding AND [2] pregnant < 20 weeks    Negative: [1] Having contractions or other symptoms of labor AND [2] < 37 weeks pregnant (i.e., )    Negative: [1] Having contractions or other symptoms of labor AND [2] > 36 weeks pregnant (i.e., term pregnancy)    Negative: Leakage of fluid (trickle, gush) from the vagina    Negative: Foreign body in vagina (e.g., tampon)    Negative: Pain or burning  Pt complains of dizziness     Visit Vitals    /90 (BP 1 Location: Left arm, BP Patient Position: Sitting)    Pulse 60    Wt 98 lb 12.8 oz (44.8 kg)    SpO2 99%    BMI 18.07 kg/m2 "with passing urine (urination) is main symptom    Negative: [1] Pregnant 23 or more weeks AND [2] baby is moving less today (e.g., kick count < 5 in 1 hour or < 10 in 2 hours)    Negative: Patient sounds very sick or weak to the triager    Negative: [1] Constant abdominal pain AND [2] present > 2 hours    Negative: [1] Intermittent lower abdominal pain AND [2] present > 24 hours    Negative: [1] Pregnant 24-36 weeks () AND [2] pinkish or brownish mucous discharge    Negative: [1] Yellow or green vaginal discharge AND [2] fever    Negative: Painful rash with tiny water blisters    Negative: [1] Rash (e.g., redness, tiny bumps, sore) of genital area AND [2] present > 24 hours    Negative: Bad smelling vaginal discharge    Negative: Tender lump (swelling or \"ball\") at vaginal opening    Negative: [1] Symptoms of a \"yeast infection\" (i.e., itchy, white discharge, not bad smelling) AND [2] not improved > 3 days following CARE ADVICE    Negative: Patient is worried about sexually transmitted disease (STD)    Negative: Pain with sexual intercourse (dyspareunia)    Abnormal color vaginal discharge (i.e., yellow, green, gray)    [1] Pregnant > 36 weeks (term) AND [2] pinkish or brownish mucous discharge    Protocols used: PREGNANCY - VAGINAL [id number]  COVID 19 Nurse Triage Plan/Patient Instructions    Please be aware that novel coronavirus (COVID-19) may be circulating in the community. If you develop symptoms such as fever, cough, or SOB or if you have concerns about the presence of another infection including coronavirus (COVID-19), please contact your health care provider or visit [email or web address].     Disposition/Instructions    In-Person Visit with provider recommended. Reference Visit Selection Guide.    Thank you for taking steps to prevent the spread of this virus.  o Limit your contact with others.  o Wear a simple mask to cover your cough.  o Wash your hands well and " often.    Resources    [facility]: About COVID-19: [email or web address]    CDC: What to Do If You're Sick: www.cdc.gov/coronavirus/2019-ncov/about/steps-when-sick.html    CDC: Ending Home Isolation: www.cdc.gov/coronavirus/2019-ncov/hcp/disposition-in-home-patients.html     CDC: Caring for Someone: www.cdc.gov/coronavirus/2019-ncov/if-you-are-sick/care-for-someone.html     [facility]: Interim Guidance for Hospital Discharge to Home: www.health.[facility]./diseases/coronavirus/hcp/hospdischarge.pdf    [facility] clinical trials (COVID-19 research studies): clinicalaffairs.[facility]-clinical-trials     Below are the COVID-19 hotlines at the [place] Department of Health ([facility]). Interpreters are available.   o For health questions: Call 998-605-3198 or 1-389.326.7943 (7 a.m. to 7 p.m.)  o For questions about schools and childcare: Call 823-129-0906 or 1-905.257.3596 (7 a.m. to 7 p.m.)

## 2021-09-30 ENCOUNTER — PRENATAL OFFICE VISIT (OUTPATIENT)
Dept: OBGYN | Facility: CLINIC | Age: 26
End: 2021-09-30
Payer: COMMERCIAL

## 2021-09-30 VITALS — SYSTOLIC BLOOD PRESSURE: 122 MMHG | BODY MASS INDEX: 41.06 KG/M2 | WEIGHT: 224.5 LBS | DIASTOLIC BLOOD PRESSURE: 74 MMHG

## 2021-09-30 DIAGNOSIS — E66.01 MORBID OBESITY (H): ICD-10-CM

## 2021-09-30 DIAGNOSIS — Z34.80 SUPERVISION OF OTHER NORMAL PREGNANCY, ANTEPARTUM: Primary | ICD-10-CM

## 2021-09-30 PROCEDURE — 99212 OFFICE O/P EST SF 10 MIN: CPT | Performed by: OBSTETRICS & GYNECOLOGY

## 2021-09-30 NOTE — PROGRESS NOTES
History and Physical  Obstetrics and Gynecology     Date of Admission:  10/4/21    Assessment & Plan   Malgorzata Ramachandran is a 26 year old female who presents for Repeat  section with bilateral salpingectomy.     ASSESSMENT:   IUP @ 38w5d  Hx c/s x2      PLAN:   Scheduled for  section with bilateral salpingectomy .  No questions.    Advised to return to care for any episodes of uncomfortable contractions > every 5-10 min for 1 hour.    Suki Moody    History of Present Illness   Malgorzata Ramachandran is a 26 year old female  38w5d  Estimated Date of Delivery: Oct 9, 2021 is calculated from Patient's last menstrual period was 2021 (approximate). [facility].  She reports intermittent contractions that are mild, occurring every 10 min or so and resolve spontaneously. Do not feel like labor.     PRENATAL COURSE  Prenatal course was essentially uncomplicated      Recent Labs   Lab Test 21  1016   ABO AB   RH Pos   AS Neg     Rhogam not indicated   Recent Labs   Lab Test 21  1015 17  1707 16  1005   HEPBANG Nonreactive  --    < >   HIAGAB Nonreactive  --    < >   GBS  --  Negative  No GBS DNA detected, presumed negative for GBS or number of bacteria may be   below the limit of detection of the assay.   Assay performed on incubated broth culture of specimen using [employer] real-time   PCR.    --    RUQIGG 16  --    < >    < > = values in this interval not displayed.         Prior to Admission Medications   Cannot display prior to admission medications because the patient has not been admitted in this contact.     Allergies   No Known Allergies      Immunization History   Immunization History   Administered Date(s) Administered     HepB 1995, 1995, 1995     Influenza Vaccine IM > 6 months Valent IIV4 (Alfuria,Fluzone) 2017     Influenza Vaccine, 6+MO IM (QUADRIVALENT W/PRESERVATIVES)  2009, 2010, 2013     MMR 1996, 2001, 2017     Rubella 2016     TDAP Vaccine (Adacel) 2006, 2013, 2017     Tdap (Adacel,Boostrix) 2021     Varicella 1996, 2010       Past Medical History:   Diagnosis Date     Abnormal Pap smear        Past Surgical History:   Procedure Laterality Date     ANESTH,LOWER ARM SKIN SURG       BIOPSY OF SKIN LESION        SECTION  2014      SECTION N/A 2017    Procedure:  SECTION;  Surgeon: Teodora Dodson DO;  Location:  L+D     GYN SURGERY      c section x 2 in past     /74   Wt 101.8 kg (224 lb 8 oz)   LMP 2021 (Approximate)   BMI 41.06 kg/m       Abdomen: gravid, single vertex fetus, non-tender, EFW 8 lbs     Constitutional: healthy, alert, active and no distress   Extremities: NT, no edema  Neurologic: Awake, alert, oriented x3  Neuropsychiatric: General: normal, calm and normal eye contact  Heart: Regular rate and rhythm  Lungs: clear to ausculation bilaterally  Cx: 0/L/H, mid, soft    Suki Moody MD

## 2021-09-30 NOTE — NURSING NOTE
"Chief Complaint   Patient presents with     Prenatal Care     38 weeks 5 days, pt thinks she lost her mucous plug a couple days ago, and she has been having contractions. No c/o VB, LoF. Feeling FM daily.        Initial /74   Wt 101.8 kg (224 lb 8 oz)   LMP 2021 (Approximate)   BMI 41.06 kg/m   Estimated body mass index is 41.06 kg/m  as calculated from the following:    Height as of 21: 1.575 m (5' 2\").    Weight as of this encounter: 101.8 kg (224 lb 8 oz).  BP completed using cuff size: large    Questioned patient about current smoking habits.  Pt. has never smoked.          The following HM Due: NONE      Maikol Andrews CMA               "

## 2021-10-01 ENCOUNTER — LAB (OUTPATIENT)
Dept: LAB | Facility: CLINIC | Age: 26
End: 2021-10-01
Payer: COMMERCIAL

## 2021-10-01 ENCOUNTER — HOSPITAL ENCOUNTER (INPATIENT)
Facility: CLINIC | Age: 26
LOS: 2 days | Discharge: HOME | End: 2021-10-03
Attending: OBSTETRICS & GYNECOLOGY | Admitting: OBSTETRICS & GYNECOLOGY
Payer: COMMERCIAL

## 2021-10-01 DIAGNOSIS — O34.219 PREVIOUS CESAREAN DELIVERY, ANTEPARTUM CONDITION OR COMPLICATION: Primary | ICD-10-CM

## 2021-10-01 DIAGNOSIS — Z11.59 ENCOUNTER FOR SCREENING FOR OTHER VIRAL DISEASES: ICD-10-CM

## 2021-10-01 DIAGNOSIS — Z36.89 ENCOUNTER FOR TRIAGE IN PREGNANT PATIENT: ICD-10-CM

## 2021-10-01 DIAGNOSIS — Z98.891 PREVIOUS CESAREAN SECTION: ICD-10-CM

## 2021-10-01 DIAGNOSIS — Z98.891 S/P CESAREAN SECTION: ICD-10-CM

## 2021-10-01 DIAGNOSIS — Z30.2 REQUEST FOR STERILIZATION: ICD-10-CM

## 2021-10-01 DIAGNOSIS — Z01.812 PRE-OPERATIVE LABORATORY EXAMINATION: ICD-10-CM

## 2021-10-01 LAB
ABO/RH(D): NORMAL
ANTIBODY SCREEN: NEGATIVE
HGB BLD-MCNC: 11.3 G/DL (ref 11.7–15.7)
SARS-COV-2 RNA RESP QL NAA+PROBE: NEGATIVE
SPECIMEN EXPIRATION DATE: NORMAL

## 2021-10-01 PROCEDURE — 360N000076 HC SURGERY LEVEL 3, PER MIN: Performed by: OBSTETRICS & GYNECOLOGY

## 2021-10-01 PROCEDURE — 85018 HEMOGLOBIN: CPT

## 2021-10-01 PROCEDURE — 250N000011 HC RX IP 250 OP 636: Performed by: ANESTHESIOLOGY

## 2021-10-01 PROCEDURE — 88302 TISSUE EXAM BY PATHOLOGIST: CPT | Mod: TC | Performed by: OBSTETRICS & GYNECOLOGY

## 2021-10-01 PROCEDURE — 86850 RBC ANTIBODY SCREEN: CPT

## 2021-10-01 PROCEDURE — 370N000017 HC ANESTHESIA TECHNICAL FEE, PER MIN: Performed by: OBSTETRICS & GYNECOLOGY

## 2021-10-01 PROCEDURE — G0463 HOSPITAL OUTPT CLINIC VISIT: HCPCS

## 2021-10-01 PROCEDURE — 250N000009 HC RX 250: Performed by: OBSTETRICS & GYNECOLOGY

## 2021-10-01 PROCEDURE — 86901 BLOOD TYPING SEROLOGIC RH(D): CPT

## 2021-10-01 PROCEDURE — U0003 INFECTIOUS AGENT DETECTION BY NUCLEIC ACID (DNA OR RNA); SEVERE ACUTE RESPIRATORY SYNDROME CORONAVIRUS 2 (SARS-COV-2) (CORONAVIRUS DISEASE [COVID-19]), AMPLIFIED PROBE TECHNIQUE, MAKING USE OF HIGH THROUGHPUT TECHNOLOGIES AS DESCRIBED BY CMS-2020-01-R: HCPCS

## 2021-10-01 PROCEDURE — 86780 TREPONEMA PALLIDUM: CPT

## 2021-10-01 PROCEDURE — 250N000011 HC RX IP 250 OP 636: Performed by: OBSTETRICS & GYNECOLOGY

## 2021-10-01 PROCEDURE — 59515 CESAREAN DELIVERY: CPT | Performed by: OBSTETRICS & GYNECOLOGY

## 2021-10-01 PROCEDURE — U0005 INFEC AGEN DETEC AMPLI PROBE: HCPCS

## 2021-10-01 PROCEDURE — 0UT70ZZ RESECTION OF BILATERAL FALLOPIAN TUBES, OPEN APPROACH: ICD-10-PCS | Performed by: OBSTETRICS & GYNECOLOGY

## 2021-10-01 PROCEDURE — 710N000009 HC RECOVERY PHASE 1, LEVEL 1, PER MIN: Performed by: OBSTETRICS & GYNECOLOGY

## 2021-10-01 PROCEDURE — 88302 TISSUE EXAM BY PATHOLOGIST: CPT | Mod: 26 | Performed by: PATHOLOGY

## 2021-10-01 PROCEDURE — 258N000003 HC RX IP 258 OP 636: Performed by: NURSE ANESTHETIST, CERTIFIED REGISTERED

## 2021-10-01 PROCEDURE — 120N000001 HC R&B MED SURG/OB

## 2021-10-01 PROCEDURE — 250N000013 HC RX MED GY IP 250 OP 250 PS 637: Performed by: OBSTETRICS & GYNECOLOGY

## 2021-10-01 PROCEDURE — 272N000001 HC OR GENERAL SUPPLY STERILE: Performed by: OBSTETRICS & GYNECOLOGY

## 2021-10-01 PROCEDURE — 86900 BLOOD TYPING SEROLOGIC ABO: CPT

## 2021-10-01 PROCEDURE — 250N000011 HC RX IP 250 OP 636: Performed by: NURSE ANESTHETIST, CERTIFIED REGISTERED

## 2021-10-01 PROCEDURE — 36415 COLL VENOUS BLD VENIPUNCTURE: CPT

## 2021-10-01 PROCEDURE — 58611 LIGATE OVIDUCT(S) ADD-ON: CPT | Performed by: OBSTETRICS & GYNECOLOGY

## 2021-10-01 PROCEDURE — 258N000003 HC RX IP 258 OP 636: Performed by: OBSTETRICS & GYNECOLOGY

## 2021-10-01 RX ORDER — SIMETHICONE 80 MG
80 TABLET,CHEWABLE ORAL 4 TIMES DAILY PRN
Status: DISCONTINUED | OUTPATIENT
Start: 2021-10-01 | End: 2021-10-03 | Stop reason: HOSPADM

## 2021-10-01 RX ORDER — OXYTOCIN/0.9 % SODIUM CHLORIDE 30/500 ML
100-340 PLASTIC BAG, INJECTION (ML) INTRAVENOUS CONTINUOUS PRN
Status: CANCELLED | OUTPATIENT
Start: 2021-10-01

## 2021-10-01 RX ORDER — AMOXICILLIN 250 MG
1 CAPSULE ORAL 2 TIMES DAILY
Status: DISCONTINUED | OUTPATIENT
Start: 2021-10-02 | End: 2021-10-03 | Stop reason: HOSPADM

## 2021-10-01 RX ORDER — ONDANSETRON 2 MG/ML
INJECTION INTRAMUSCULAR; INTRAVENOUS PRN
Status: DISCONTINUED | OUTPATIENT
Start: 2021-10-01 | End: 2021-10-01

## 2021-10-01 RX ORDER — METHYLERGONOVINE MALEATE 0.2 MG/ML
200 INJECTION INTRAVENOUS
Status: DISCONTINUED | OUTPATIENT
Start: 2021-10-01 | End: 2021-10-01 | Stop reason: HOSPADM

## 2021-10-01 RX ORDER — MISOPROSTOL 200 UG/1
400 TABLET ORAL
Status: DISCONTINUED | OUTPATIENT
Start: 2021-10-01 | End: 2021-10-01 | Stop reason: HOSPADM

## 2021-10-01 RX ORDER — FERROUS GLUCONATE 324(38)MG
324 TABLET ORAL
Status: DISCONTINUED | OUTPATIENT
Start: 2021-10-02 | End: 2021-10-03 | Stop reason: HOSPADM

## 2021-10-01 RX ORDER — OXYTOCIN 10 [USP'U]/ML
10 INJECTION, SOLUTION INTRAMUSCULAR; INTRAVENOUS
Status: DISCONTINUED | OUTPATIENT
Start: 2021-10-01 | End: 2021-10-03 | Stop reason: HOSPADM

## 2021-10-01 RX ORDER — MORPHINE SULFATE 1 MG/ML
INJECTION, SOLUTION EPIDURAL; INTRATHECAL; INTRAVENOUS PRN
Status: DISCONTINUED | OUTPATIENT
Start: 2021-10-01 | End: 2021-10-01

## 2021-10-01 RX ORDER — PHENYLEPHRINE HYDROCHLORIDE 10 MG/ML
INJECTION INTRAVENOUS PRN
Status: DISCONTINUED | OUTPATIENT
Start: 2021-10-01 | End: 2021-10-01

## 2021-10-01 RX ORDER — LIDOCAINE 40 MG/G
CREAM TOPICAL
Status: DISCONTINUED | OUTPATIENT
Start: 2021-10-01 | End: 2021-10-01 | Stop reason: HOSPADM

## 2021-10-01 RX ORDER — CARBOPROST TROMETHAMINE 250 UG/ML
250 INJECTION, SOLUTION INTRAMUSCULAR
Status: DISCONTINUED | OUTPATIENT
Start: 2021-10-01 | End: 2021-10-01 | Stop reason: HOSPADM

## 2021-10-01 RX ORDER — SODIUM CHLORIDE, SODIUM LACTATE, POTASSIUM CHLORIDE, CALCIUM CHLORIDE 600; 310; 30; 20 MG/100ML; MG/100ML; MG/100ML; MG/100ML
INJECTION, SOLUTION INTRAVENOUS CONTINUOUS
Status: DISCONTINUED | OUTPATIENT
Start: 2021-10-01 | End: 2021-10-01 | Stop reason: HOSPADM

## 2021-10-01 RX ORDER — MISOPROSTOL 200 UG/1
400 TABLET ORAL
Status: DISCONTINUED | OUTPATIENT
Start: 2021-10-01 | End: 2021-10-03 | Stop reason: HOSPADM

## 2021-10-01 RX ORDER — MODIFIED LANOLIN
OINTMENT (GRAM) TOPICAL
Status: DISCONTINUED | OUTPATIENT
Start: 2021-10-01 | End: 2021-10-03 | Stop reason: HOSPADM

## 2021-10-01 RX ORDER — MISOPROSTOL 200 UG/1
800 TABLET ORAL
Status: DISCONTINUED | OUTPATIENT
Start: 2021-10-01 | End: 2021-10-03 | Stop reason: HOSPADM

## 2021-10-01 RX ORDER — BUPIVACAINE HYDROCHLORIDE 7.5 MG/ML
INJECTION, SOLUTION INTRASPINAL PRN
Status: DISCONTINUED | OUTPATIENT
Start: 2021-10-01 | End: 2021-10-01

## 2021-10-01 RX ORDER — AZITHROMYCIN 500 MG/5ML
500 INJECTION, POWDER, LYOPHILIZED, FOR SOLUTION INTRAVENOUS
Status: COMPLETED | OUTPATIENT
Start: 2021-10-01 | End: 2021-10-01

## 2021-10-01 RX ORDER — METOCLOPRAMIDE 10 MG/1
10 TABLET ORAL EVERY 6 HOURS PRN
Status: DISCONTINUED | OUTPATIENT
Start: 2021-10-01 | End: 2021-10-03 | Stop reason: HOSPADM

## 2021-10-01 RX ORDER — FENTANYL CITRATE 50 UG/ML
25 INJECTION, SOLUTION INTRAMUSCULAR; INTRAVENOUS EVERY 5 MIN PRN
Status: DISCONTINUED | OUTPATIENT
Start: 2021-10-01 | End: 2021-10-02 | Stop reason: HOSPADM

## 2021-10-01 RX ORDER — PROCHLORPERAZINE 25 MG
25 SUPPOSITORY, RECTAL RECTAL EVERY 12 HOURS PRN
Status: DISCONTINUED | OUTPATIENT
Start: 2021-10-01 | End: 2021-10-03 | Stop reason: HOSPADM

## 2021-10-01 RX ORDER — TRANEXAMIC ACID 10 MG/ML
1 INJECTION, SOLUTION INTRAVENOUS EVERY 30 MIN PRN
Status: DISCONTINUED | OUTPATIENT
Start: 2021-10-01 | End: 2021-10-03 | Stop reason: HOSPADM

## 2021-10-01 RX ORDER — OXYTOCIN 10 [USP'U]/ML
10 INJECTION, SOLUTION INTRAMUSCULAR; INTRAVENOUS
Status: CANCELLED | OUTPATIENT
Start: 2021-10-01

## 2021-10-01 RX ORDER — NALOXONE HYDROCHLORIDE 0.4 MG/ML
0.2 INJECTION, SOLUTION INTRAMUSCULAR; INTRAVENOUS; SUBCUTANEOUS
Status: DISCONTINUED | OUTPATIENT
Start: 2021-10-01 | End: 2021-10-03 | Stop reason: HOSPADM

## 2021-10-01 RX ORDER — MAGNESIUM HYDROXIDE 1200 MG/15ML
LIQUID ORAL PRN
Status: DISCONTINUED | OUTPATIENT
Start: 2021-10-01 | End: 2021-10-01

## 2021-10-01 RX ORDER — CITRIC ACID/SODIUM CITRATE 334-500MG
30 SOLUTION, ORAL ORAL
Status: COMPLETED | OUTPATIENT
Start: 2021-10-01 | End: 2021-10-01

## 2021-10-01 RX ORDER — AMOXICILLIN 250 MG
2 CAPSULE ORAL 2 TIMES DAILY
Status: DISCONTINUED | OUTPATIENT
Start: 2021-10-02 | End: 2021-10-03 | Stop reason: HOSPADM

## 2021-10-01 RX ORDER — HYDROCORTISONE 2.5 %
CREAM (GRAM) TOPICAL 3 TIMES DAILY PRN
Status: DISCONTINUED | OUTPATIENT
Start: 2021-10-01 | End: 2021-10-03 | Stop reason: HOSPADM

## 2021-10-01 RX ORDER — HYDROMORPHONE HYDROCHLORIDE 1 MG/ML
0.2 INJECTION, SOLUTION INTRAMUSCULAR; INTRAVENOUS; SUBCUTANEOUS EVERY 5 MIN PRN
Status: DISCONTINUED | OUTPATIENT
Start: 2021-10-01 | End: 2021-10-02 | Stop reason: HOSPADM

## 2021-10-01 RX ORDER — TRANEXAMIC ACID 10 MG/ML
1 INJECTION, SOLUTION INTRAVENOUS EVERY 30 MIN PRN
Status: DISCONTINUED | OUTPATIENT
Start: 2021-10-01 | End: 2021-10-01 | Stop reason: HOSPADM

## 2021-10-01 RX ORDER — BISACODYL 10 MG
10 SUPPOSITORY, RECTAL RECTAL DAILY PRN
Status: DISCONTINUED | OUTPATIENT
Start: 2021-10-03 | End: 2021-10-03 | Stop reason: HOSPADM

## 2021-10-01 RX ORDER — OXYTOCIN 10 [USP'U]/ML
10 INJECTION, SOLUTION INTRAMUSCULAR; INTRAVENOUS
Status: DISCONTINUED | OUTPATIENT
Start: 2021-10-01 | End: 2021-10-01 | Stop reason: HOSPADM

## 2021-10-01 RX ORDER — KETOROLAC TROMETHAMINE 30 MG/ML
30 INJECTION, SOLUTION INTRAMUSCULAR; INTRAVENOUS EVERY 6 HOURS
Status: COMPLETED | OUTPATIENT
Start: 2021-10-02 | End: 2021-10-02

## 2021-10-01 RX ORDER — CARBOPROST TROMETHAMINE 250 UG/ML
250 INJECTION, SOLUTION INTRAMUSCULAR
Status: DISCONTINUED | OUTPATIENT
Start: 2021-10-01 | End: 2021-10-03 | Stop reason: HOSPADM

## 2021-10-01 RX ORDER — SODIUM CHLORIDE, SODIUM LACTATE, POTASSIUM CHLORIDE, CALCIUM CHLORIDE 600; 310; 30; 20 MG/100ML; MG/100ML; MG/100ML; MG/100ML
INJECTION, SOLUTION INTRAVENOUS CONTINUOUS
Status: DISCONTINUED | OUTPATIENT
Start: 2021-10-02 | End: 2021-10-02 | Stop reason: HOSPADM

## 2021-10-01 RX ORDER — ACETAMINOPHEN 325 MG/1
975 TABLET ORAL ONCE
Status: COMPLETED | OUTPATIENT
Start: 2021-10-01 | End: 2021-10-01

## 2021-10-01 RX ORDER — ACETAMINOPHEN 325 MG/1
975 TABLET ORAL EVERY 6 HOURS
Status: DISCONTINUED | OUTPATIENT
Start: 2021-10-02 | End: 2021-10-03 | Stop reason: HOSPADM

## 2021-10-01 RX ORDER — OXYCODONE HYDROCHLORIDE 5 MG/1
5 TABLET ORAL EVERY 4 HOURS PRN
Status: DISCONTINUED | OUTPATIENT
Start: 2021-10-01 | End: 2021-10-02

## 2021-10-01 RX ORDER — METOCLOPRAMIDE HYDROCHLORIDE 5 MG/ML
10 INJECTION INTRAMUSCULAR; INTRAVENOUS EVERY 6 HOURS PRN
Status: DISCONTINUED | OUTPATIENT
Start: 2021-10-01 | End: 2021-10-03 | Stop reason: HOSPADM

## 2021-10-01 RX ORDER — ONDANSETRON 4 MG/1
4 TABLET, ORALLY DISINTEGRATING ORAL EVERY 30 MIN PRN
Status: DISCONTINUED | OUTPATIENT
Start: 2021-10-01 | End: 2021-10-02 | Stop reason: HOSPADM

## 2021-10-01 RX ORDER — CEFAZOLIN SODIUM 2 G/100ML
2 INJECTION, SOLUTION INTRAVENOUS SEE ADMIN INSTRUCTIONS
Status: DISCONTINUED | OUTPATIENT
Start: 2021-10-01 | End: 2021-10-01 | Stop reason: HOSPADM

## 2021-10-01 RX ORDER — DEXTROSE, SODIUM CHLORIDE, SODIUM LACTATE, POTASSIUM CHLORIDE, AND CALCIUM CHLORIDE 5; .6; .31; .03; .02 G/100ML; G/100ML; G/100ML; G/100ML; G/100ML
INJECTION, SOLUTION INTRAVENOUS CONTINUOUS
Status: DISCONTINUED | OUTPATIENT
Start: 2021-10-02 | End: 2021-10-03 | Stop reason: HOSPADM

## 2021-10-01 RX ORDER — CEFAZOLIN SODIUM 2 G/100ML
2 INJECTION, SOLUTION INTRAVENOUS
Status: DISCONTINUED | OUTPATIENT
Start: 2021-10-01 | End: 2021-10-01 | Stop reason: HOSPADM

## 2021-10-01 RX ORDER — LIDOCAINE 40 MG/G
CREAM TOPICAL
Status: DISCONTINUED | OUTPATIENT
Start: 2021-10-01 | End: 2021-10-03 | Stop reason: HOSPADM

## 2021-10-01 RX ORDER — ONDANSETRON 2 MG/ML
4 INJECTION INTRAMUSCULAR; INTRAVENOUS EVERY 30 MIN PRN
Status: DISCONTINUED | OUTPATIENT
Start: 2021-10-01 | End: 2021-10-02 | Stop reason: HOSPADM

## 2021-10-01 RX ORDER — OXYTOCIN/0.9 % SODIUM CHLORIDE 30/500 ML
340 PLASTIC BAG, INJECTION (ML) INTRAVENOUS CONTINUOUS PRN
Status: DISCONTINUED | OUTPATIENT
Start: 2021-10-01 | End: 2021-10-03 | Stop reason: HOSPADM

## 2021-10-01 RX ORDER — IBUPROFEN 800 MG/1
800 TABLET, FILM COATED ORAL EVERY 6 HOURS
Status: DISCONTINUED | OUTPATIENT
Start: 2021-10-02 | End: 2021-10-02

## 2021-10-01 RX ORDER — LABETALOL HYDROCHLORIDE 5 MG/ML
10 INJECTION, SOLUTION INTRAVENOUS
Status: DISCONTINUED | OUTPATIENT
Start: 2021-10-01 | End: 2021-10-02 | Stop reason: HOSPADM

## 2021-10-01 RX ORDER — METHYLERGONOVINE MALEATE 0.2 MG/ML
200 INJECTION INTRAVENOUS
Status: DISCONTINUED | OUTPATIENT
Start: 2021-10-01 | End: 2021-10-03 | Stop reason: HOSPADM

## 2021-10-01 RX ORDER — PROCHLORPERAZINE MALEATE 10 MG
10 TABLET ORAL EVERY 6 HOURS PRN
Status: DISCONTINUED | OUTPATIENT
Start: 2021-10-01 | End: 2021-10-03 | Stop reason: HOSPADM

## 2021-10-01 RX ORDER — ONDANSETRON 4 MG/1
4 TABLET, ORALLY DISINTEGRATING ORAL EVERY 6 HOURS PRN
Status: DISCONTINUED | OUTPATIENT
Start: 2021-10-01 | End: 2021-10-03 | Stop reason: HOSPADM

## 2021-10-01 RX ORDER — ONDANSETRON 2 MG/ML
4 INJECTION INTRAMUSCULAR; INTRAVENOUS EVERY 6 HOURS PRN
Status: DISCONTINUED | OUTPATIENT
Start: 2021-10-01 | End: 2021-10-03 | Stop reason: HOSPADM

## 2021-10-01 RX ORDER — MISOPROSTOL 200 UG/1
800 TABLET ORAL
Status: DISCONTINUED | OUTPATIENT
Start: 2021-10-01 | End: 2021-10-01 | Stop reason: HOSPADM

## 2021-10-01 RX ORDER — OXYTOCIN/0.9 % SODIUM CHLORIDE 30/500 ML
340 PLASTIC BAG, INJECTION (ML) INTRAVENOUS CONTINUOUS PRN
Status: COMPLETED | OUTPATIENT
Start: 2021-10-01 | End: 2021-10-01

## 2021-10-01 RX ORDER — NALOXONE HYDROCHLORIDE 0.4 MG/ML
0.4 INJECTION, SOLUTION INTRAMUSCULAR; INTRAVENOUS; SUBCUTANEOUS
Status: DISCONTINUED | OUTPATIENT
Start: 2021-10-01 | End: 2021-10-03 | Stop reason: HOSPADM

## 2021-10-01 RX ORDER — OXYCODONE HYDROCHLORIDE 5 MG/1
5 TABLET ORAL EVERY 4 HOURS PRN
Status: DISCONTINUED | OUTPATIENT
Start: 2021-10-01 | End: 2021-10-03 | Stop reason: HOSPADM

## 2021-10-01 RX ADMIN — ONDANSETRON 4 MG: 2 INJECTION INTRAMUSCULAR; INTRAVENOUS at 22:35

## 2021-10-01 RX ADMIN — OXYTOCIN-SODIUM CHLORIDE 0.9% IV SOLN 30 UNIT/500ML 340 ML/HR: 30-0.9/5 SOLUTION at 22:33

## 2021-10-01 RX ADMIN — BUPIVACAINE HYDROCHLORIDE IN DEXTROSE 15 MG: 7.5 INJECTION, SOLUTION SUBARACHNOID at 22:10

## 2021-10-01 RX ADMIN — ACETAMINOPHEN 975 MG: 325 TABLET, FILM COATED ORAL at 21:54

## 2021-10-01 RX ADMIN — PHENYLEPHRINE HYDROCHLORIDE 200 MCG: 10 INJECTION INTRAVENOUS at 22:50

## 2021-10-01 RX ADMIN — AZITHROMYCIN MONOHYDRATE 500 MG: 500 INJECTION, POWDER, LYOPHILIZED, FOR SOLUTION INTRAVENOUS at 21:54

## 2021-10-01 RX ADMIN — PHENYLEPHRINE HYDROCHLORIDE 0.2 MCG/KG/MIN: 10 INJECTION INTRAVENOUS at 22:20

## 2021-10-01 RX ADMIN — MORPHINE SULFATE 0.25 MG: 1 INJECTION EPIDURAL; INTRATHECAL; INTRAVENOUS at 22:10

## 2021-10-01 RX ADMIN — SODIUM CITRATE AND CITRIC ACID MONOHYDRATE 30 ML: 500; 334 SOLUTION ORAL at 21:55

## 2021-10-01 RX ADMIN — PHENYLEPHRINE HYDROCHLORIDE 200 MCG: 10 INJECTION INTRAVENOUS at 22:15

## 2021-10-01 RX ADMIN — PHENYLEPHRINE HYDROCHLORIDE 200 MCG: 10 INJECTION INTRAVENOUS at 22:52

## 2021-10-01 RX ADMIN — SODIUM CHLORIDE, POTASSIUM CHLORIDE, SODIUM LACTATE AND CALCIUM CHLORIDE: 600; 310; 30; 20 INJECTION, SOLUTION INTRAVENOUS at 21:53

## 2021-10-01 RX ADMIN — CEFAZOLIN SODIUM 2 G: 2 INJECTION, SOLUTION INTRAVENOUS at 22:19

## 2021-10-01 RX ADMIN — PHENYLEPHRINE HYDROCHLORIDE 200 MCG: 10 INJECTION INTRAVENOUS at 22:46

## 2021-10-01 ASSESSMENT — MIFFLIN-ST. JEOR: SCORE: 1713.84

## 2021-10-02 LAB
ERYTHROCYTE [DISTWIDTH] IN BLOOD BY AUTOMATED COUNT: 13.2 % (ref 10–15)
FERRITIN SERPL-MCNC: 14 NG/ML (ref 12–150)
HCT VFR BLD AUTO: 27 % (ref 35–47)
HGB BLD-MCNC: 9 G/DL (ref 11.7–15.7)
HOLD SPECIMEN: NORMAL
IRON SATN MFR SERPL: 111 % (ref 15–46)
IRON SERPL-MCNC: 510 UG/DL (ref 35–180)
MCH RBC QN AUTO: 31.6 PG (ref 26.5–33)
MCHC RBC AUTO-ENTMCNC: 33.3 G/DL (ref 31.5–36.5)
MCV RBC AUTO: 95 FL (ref 78–100)
PLATELET # BLD AUTO: 169 10E3/UL (ref 150–450)
RBC # BLD AUTO: 2.85 10E6/UL (ref 3.8–5.2)
TIBC SERPL-MCNC: 461 UG/DL (ref 240–430)
WBC # BLD AUTO: 10.2 10E3/UL (ref 4–11)

## 2021-10-02 PROCEDURE — 82728 ASSAY OF FERRITIN: CPT | Performed by: OBSTETRICS & GYNECOLOGY

## 2021-10-02 PROCEDURE — 250N000011 HC RX IP 250 OP 636: Performed by: ANESTHESIOLOGY

## 2021-10-02 PROCEDURE — 120N000001 HC R&B MED SURG/OB

## 2021-10-02 PROCEDURE — 250N000011 HC RX IP 250 OP 636: Performed by: OBSTETRICS & GYNECOLOGY

## 2021-10-02 PROCEDURE — 36415 COLL VENOUS BLD VENIPUNCTURE: CPT | Performed by: OBSTETRICS & GYNECOLOGY

## 2021-10-02 PROCEDURE — 85027 COMPLETE CBC AUTOMATED: CPT | Performed by: OBSTETRICS & GYNECOLOGY

## 2021-10-02 PROCEDURE — 83550 IRON BINDING TEST: CPT | Performed by: OBSTETRICS & GYNECOLOGY

## 2021-10-02 PROCEDURE — 250N000013 HC RX MED GY IP 250 OP 250 PS 637: Performed by: OBSTETRICS & GYNECOLOGY

## 2021-10-02 PROCEDURE — 258N000003 HC RX IP 258 OP 636: Performed by: OBSTETRICS & GYNECOLOGY

## 2021-10-02 RX ORDER — NALBUPHINE HYDROCHLORIDE 10 MG/ML
2.5-5 INJECTION, SOLUTION INTRAMUSCULAR; INTRAVENOUS; SUBCUTANEOUS EVERY 4 HOURS PRN
Status: DISCONTINUED | OUTPATIENT
Start: 2021-10-02 | End: 2021-10-03 | Stop reason: HOSPADM

## 2021-10-02 RX ORDER — DIPHENHYDRAMINE HYDROCHLORIDE 50 MG/ML
50 INJECTION INTRAMUSCULAR; INTRAVENOUS
Status: DISCONTINUED | OUTPATIENT
Start: 2021-10-02 | End: 2021-10-03 | Stop reason: HOSPADM

## 2021-10-02 RX ORDER — METHYLPREDNISOLONE SODIUM SUCCINATE 125 MG/2ML
125 INJECTION, POWDER, LYOPHILIZED, FOR SOLUTION INTRAMUSCULAR; INTRAVENOUS
Status: DISCONTINUED | OUTPATIENT
Start: 2021-10-02 | End: 2021-10-03 | Stop reason: HOSPADM

## 2021-10-02 RX ORDER — FERROUS SULFATE 325(65) MG
325 TABLET ORAL 2 TIMES DAILY
Status: DISCONTINUED | OUTPATIENT
Start: 2021-10-02 | End: 2021-10-02

## 2021-10-02 RX ORDER — IBUPROFEN 800 MG/1
800 TABLET, FILM COATED ORAL EVERY 6 HOURS
Status: DISCONTINUED | OUTPATIENT
Start: 2021-10-02 | End: 2021-10-03 | Stop reason: HOSPADM

## 2021-10-02 RX ADMIN — SODIUM CHLORIDE, SODIUM LACTATE, POTASSIUM CHLORIDE, CALCIUM CHLORIDE AND DEXTROSE MONOHYDRATE: 5; 600; 310; 30; 20 INJECTION, SOLUTION INTRAVENOUS at 00:07

## 2021-10-02 RX ADMIN — KETOROLAC TROMETHAMINE 30 MG: 30 INJECTION, SOLUTION INTRAMUSCULAR; INTRAVENOUS at 15:58

## 2021-10-02 RX ADMIN — SENNOSIDES AND DOCUSATE SODIUM 1 TABLET: 50; 8.6 TABLET ORAL at 22:42

## 2021-10-02 RX ADMIN — ACETAMINOPHEN 975 MG: 325 TABLET, FILM COATED ORAL at 22:41

## 2021-10-02 RX ADMIN — IBUPROFEN 800 MG: 800 TABLET, FILM COATED ORAL at 22:42

## 2021-10-02 RX ADMIN — NALBUPHINE HYDROCHLORIDE 5 MG: 10 INJECTION, SOLUTION INTRAMUSCULAR; INTRAVENOUS; SUBCUTANEOUS at 19:50

## 2021-10-02 RX ADMIN — IRON SUCROSE 300 MG: 20 INJECTION, SOLUTION INTRAVENOUS at 09:44

## 2021-10-02 RX ADMIN — FERROUS GLUCONATE 324 MG: 324 TABLET ORAL at 09:28

## 2021-10-02 RX ADMIN — KETOROLAC TROMETHAMINE 30 MG: 30 INJECTION, SOLUTION INTRAMUSCULAR; INTRAVENOUS at 09:27

## 2021-10-02 RX ADMIN — NALBUPHINE HYDROCHLORIDE 5 MG: 10 INJECTION, SOLUTION INTRAMUSCULAR; INTRAVENOUS; SUBCUTANEOUS at 00:19

## 2021-10-02 RX ADMIN — SENNOSIDES AND DOCUSATE SODIUM 1 TABLET: 50; 8.6 TABLET ORAL at 09:28

## 2021-10-02 RX ADMIN — ENOXAPARIN SODIUM 40 MG: 40 INJECTION SUBCUTANEOUS at 09:28

## 2021-10-02 RX ADMIN — KETOROLAC TROMETHAMINE 30 MG: 30 INJECTION, SOLUTION INTRAMUSCULAR; INTRAVENOUS at 04:11

## 2021-10-02 RX ADMIN — ENOXAPARIN SODIUM 40 MG: 40 INJECTION SUBCUTANEOUS at 22:41

## 2021-10-02 RX ADMIN — ACETAMINOPHEN 975 MG: 325 TABLET, FILM COATED ORAL at 15:57

## 2021-10-02 NOTE — PLAN OF CARE
Patient up to bathroom voiding without complications  Hemoglobin 6.3 results from Lab call per Dr. Oshea  -orders for Venofer IV   Patient denies dizziness and is tolerating activity fair   Tolerating regular diet and passing gas   monitor

## 2021-10-02 NOTE — ANESTHESIA POSTPROCEDURE EVALUATION
Patient: Malgorzata Ramachandran    Procedure(s):  REPEAT  SECTION WITH BILATERAL SALPINGECTOMY    Diagnosis:Previous  section [Z98.891]  Request for sterilization [Z30.2]  Diagnosis Additional Information: No value filed.    Anesthesia Type:  Spinal    Note:  Disposition: Inpatient   Postop Pain Control: Uneventful            Sign Out: Well controlled pain   PONV: No   Neuro/Psych: Uneventful            Sign Out: Acceptable/Baseline neuro status   Airway/Respiratory: Uneventful            Sign Out: Acceptable/Baseline resp. status   CV/Hemodynamics: Uneventful            Sign Out: Acceptable CV status; No obvious hypovolemia; No obvious fluid overload   Other NRE: NONE   DID A NON-ROUTINE EVENT OCCUR? No           Last vitals:  Vitals Value Taken Time   /77 10/01/21 2319   Temp     Pulse     Resp     SpO2 99 % 10/01/21 2317   Vitals shown include unvalidated device data.    Electronically Signed By: Nasim Bennett MD  2021  11:20 PM

## 2021-10-02 NOTE — PLAN OF CARE
Patient meeting expected outcomes. Breastfeeding,  very sleepy since transfer from L&D. No successful latch since transfer. Pain well managed with Toradol, declining tylenol at this time. Ambulated to bathroom x1. Became lightheaded and dizzy on walk back to bed. Was able to void 100ml's in hat in toilet, heard more urine miss the hat and go directly into toilet. SO present and supportive.     Patients mobililty level scored using the bedside mobility assistance tool (BMAT). Patient is at a mobility level test number: 4. Mobility equipment used: none required. Required assist of 1 staff members. Further use of BMAT scoring required.

## 2021-10-02 NOTE — L&D DELIVERY NOTE
"Delivery Summary    26 year old  at 38w6d who presented with labor prior to her scheduled  delivery and BTL.  A  section was recommended at the time of presentation. Risks and benefits were reviewed and the patient consented for the procedure.     Complications: None apparent     Findings: A single vigorous, liveborn female weighing 6 lb 11 oz with apgars of 9 and 9. Normal appearing uterus, fallopian tubes, ovaries.  No nuchal cord.  Meconium stained amniontic fluid.  Minimal fascial adhesions.  Minimal intraabdominal adhesions from the uterus to the bladder and peritoneum.  \"[place]\"    Rhona Lopez MD, MPH  [facility] OB/Gyn         Sergey Morgan-Malgorzata [[id number]]    Delivery/Placenta Date and Time    Delivery Date: 10/1/21 Delivery Time: 10:31 PM           Cord    Cord Complications: None               Stem cell collection?: No        Measurements    Weight: 6 lb 11.2 oz       Labor Events and Shoulder Dystocia    Fetal Tracing Prior to Delivery: Category 1  Shoulder dystocia present?: Neg     Delivery (Maternal) (Provider to Complete) ([id number])    Episiotomy: None  Perineal lacerations: None    Repair suture: None     Blood Loss  Mother: Mary Lou Morgan #[id number]   Start of Mother's Information    Delivery Blood Loss  10/01/21 2224 - 10/01/21 2308    Total Surgical QBL Blood Loss (mL) Hospital Encounter 815 mL    Total  815 mL         End of Mother's Information  Mother: Mary Lou Morgan #[id number]          Delivery - Provider to Complete ([id number])    Delivery Type (Choose the 1 that will go to the Birth History): , Low Transverse                    Priority: Urgent    Specifics: Repeat   Indications for Repeat: Labor/SROM/Planned Repeat                 Placenta    Removal: Spontaneous  Disposition: Hospital disposal           Presentation and Position    Presentation: Vertex                  Rhona Lopez MD  "

## 2021-10-02 NOTE — ANESTHESIA PROCEDURE NOTES
Intrathecal injection Procedure Note    Pre-Procedure   Staff -        Anesthesiologist:  Nasim Bennett MD       Performed By: anesthesiologist       Referred By: Jessica       Location: OR       Pre-Anesthestic Checklist: patient identified, IV checked, risks and benefits discussed, informed consent, monitors and equipment checked, pre-op evaluation and at physician/surgeon's request  Timeout:       Correct Patient: Yes        Correct Procedure: Yes        Correct Site: Yes        Correct Position: Yes   Procedure Documentation  Procedure: intrathecal injection       Patient Position: sitting       Patient Prep/Sterile Barriers: sterile gloves, mask, patient draped       Skin prep: Betadine (midline approach).       Needle Gauge: 24.        Needle Length (Inches): 3.5        Spinal Needle Type: Pencan       Introducer used       # of attempts: 1 and  # of redirects:  2    Assessment/Narrative         Paresthesias: No.       CSF fluid: blood tinged.

## 2021-10-02 NOTE — PROVIDER NOTIFICATION
10/01/21 2126   Provider Notification   Provider Name/Title Dr Wero Phipps   Method of Notification Phone   Request Evaluate in Person   Notification Reason SVE;Patient Arrived     Dr Wero phipps notified of arrival, SVE, contractions every 5 minutes and patient is uncomfortable with contractions.  Plan to prepare for c/section within 30-60 minutes.  Will try to expedite labs and covid sent today for c/s on Monday.

## 2021-10-02 NOTE — OP NOTE
"Malgorzata Ramachandran    1995   MRN [id number]     Operative Note     Date of Operation: 10/1/2021   Surgeon: Rhona Lopez MD     Pre-operative diagnosis: IUP at 38w6d in labor, history of  delivery x2, desires sterilization  Post-operative diagnosis: same, delivered     Procedures: repeat lower transverse  section with double layer uterine closure via Pfannenstiel incision, bilateral salpingectomy     Anesthesia: spinal  IVF: see anesthesia record  UOP: see anesthesia record  EBL: 815cc QBL    Indications: 26 year old  at 38w6d who presented with labor prior to her scheduled  delivery and BTL.  A  section was recommended at the time of presentation. Risks and benefits were reviewed and the patient consented for the procedure.    Complications: None apparent    Findings: A single vigorous, liveborn female weighing 6 lb 11 oz with apgars of 9 and 9. Normal appearing uterus, fallopian tubes, ovaries.  No nuchal cord.  Meconium stained amniontic fluid.  Minimal fascial adhesions.  Minimal intraabdominal adhesions from the uterus to the bladder and peritoneum.  \"[place]\"    Specimen: bilateral fallopian tubes    Procedure Details: The patient was taken back to the operating room where she underwent spinal anesthesia. She was then prepped and draped in the usual sterile fashion in the dorsal supine position with a leftward tilt. A time out was performed. A pfannenstiel skin incision was made with the scalpel and carried through the underlying layer to the fascia. The fascia was incised in the midline and extended laterally with Lau scissors. The superior aspect of the fascial incision was grasped with Kocher clamps, elevated and the underlying rectus muscles were dissected off bluntly and with Lau scissors. Attention was then turned to the inferior aspect of the incision, which, in a similar fashion was grasped, tented up with Kocher clamps, and the rectus muscle " dissected off bluntly and with Lau scissors. The rectus muscles were then  in the midline. The peritoneum was then identified and entered bluntly. This was extended with sharp and blunt dissection. The Dell O retractor was then inserted. The lower uterine segment was incised in a transverse fashion with the scalpel. The incision was extended digitally. The infant's head was delivered, the body rotated, and the rest of the infant was delivered atraumatically. The cord was clamped and cut and then the baby was handed off to the nursing staff.  The placenta was then removed with gentle traction on the cord and fundal message. The uterus was exteriorized and cleared of all clots and debris. The uterine incision was repaired with 0-Vicryl in a running locked fashion. A second layer of 0-monocryl was used to imbricate the incision. The posterior cul-de-sac was cleared of clots and debris. The bilateral fallopian tubes were elevated with the barrett clamp and were serially dissected away from the mesosalpinx with the handheld ligasure device.  They were amputated at the cornua and sent for pathology.  The bilateral pedicles were noted to be hemostatic.  The uterus was returned to the abdomen and noted to be hemostatic. The gutters were then cleared of clots. A kocher clamp was used to elevate the fascia superiorly and inferiorly and good hemostasis was noted.  The fascia was closed with 0-vicryl in a running fashion. The subcutaneous tissue was irrigated and areas of bleeding were controlled with cautery. The subcutaneous tissue was closed with 3-0 plain. The skin was closed with 3-0 vicryl on a gopal needle. The patient tolerated the procedure well and was taken to the recovery room in stable condition. All lap, instrument, and sharps counts were correct times two.      Rhona Lopez MD, MPH  Obstetrics and Gynecology

## 2021-10-02 NOTE — PLAN OF CARE
Data: Vital signs within normal limits. Postpartum checks within normal limits - see flow record. Patient eating and drinking normally. Patient able to empty bladder independently and is up ambulating. No apparent signs of infection. Patient performing self cares, is able to care for infant and is breastfeeding every 2-3 hours.  Incision LATA. Patient took a shower this shift.  Action: Patient medicated with tylenol and Toradol during the shift for pain. See MAR. Patient education done, see flow record.  Response: Parents bonding well with baby. Father of the baby at bedside, supportive.  Plan: Continue current plan of care.  Anticipate discharge tomorrow.

## 2021-10-02 NOTE — ANESTHESIA CARE TRANSFER NOTE
Patient: Malgorzata Ramachandran    Procedure(s):  REPEAT  SECTION WITH BILATERAL SALPINGECTOMY    Diagnosis: Previous  section [Z98.891]  Request for sterilization [Z30.2]  Diagnosis Additional Information: No value filed.    Anesthesia Type:   Spinal     Note:    Oropharynx: oropharynx clear of all foreign objects and spontaneously breathing  Level of Consciousness: awake  Oxygen Supplementation: room air    Independent Airway: airway patency satisfactory and stable  Dentition: dentition unchanged  Vital Signs Stable: post-procedure vital signs reviewed and stable  Report to RN Given: handoff report given  Patient transferred to: Labor and Delivery    Handoff Report: Identifed the Patient, Identified the Reponsible Provider, Reviewed the pertinent medical history, Discussed the surgical course, Reviewed Intra-OP anesthesia mangement and issues during anesthesia, Set expectations for post-procedure period and Allowed opportunity for questions and acknowledgement of understanding      Vitals:  Vitals Value Taken Time   /77 10/01/21 2319   Temp     Pulse     Resp     SpO2 99 % 10/01/21 2317   Vitals shown include unvalidated device data.    Electronically Signed By: RAMSEY Arenas CRNA  2021  11:21 PM

## 2021-10-02 NOTE — PROGRESS NOTES
PPD#1  Doing well. Pain well controlled on oral pain medication.   Tolerating regular diet. Voiding well. Reports flatus.   Ambulating but endorses pre-syncopal episodes.  Lochia is scant. Breast feeding.     Vitals:    10/02/21 0141 10/02/21 0225 10/02/21 0337 10/02/21 0441   BP: 124/60 119/66 112/57 108/57   Pulse:       Resp:  18 18 18   Temp:  98.6  F (37  C)     TempSrc:  Oral     SpO2:  98% 98% 97%   Weight:       Height:         Urine output: 300 mL since 2300, 10/1    General Appearance: NAD  Abdomen: Soft, NT, ND. Fundus firm at U-2  Incision: Bandaged, appears dry  Extremities: NT, trace edema    Hemoglobin   Date Value Ref Range Status   10/01/2021 11.3 (L) 11.7 - 15.7 g/dL Final   09/07/2021 10.5 (L) 11.7 - 15.7 g/dL Final   06/25/2021 11.1 (L) 11.7 - 15.7 g/dL Final   03/31/2021 12.8 11.7 - 15.7 g/dL Final   ]    A/P: 26 year old  PPD#1 s/p RLTCS at 38w6d in labor and bilateral salpingectomy for sterilization. Hemodynamically stable.   -- AM Hgb returned at 6.3 from lab call-back, not yet finalized in chart: updated patient about this result and its impact on her recovery; recommended 1 unit pRBCs transfusion given symptomatic status vs. Acceptable to also receive IV iron infusion; patient elects to proceed with IV iron infusion first and see how she responds to that symptomatically; if it does not resolve her symptoms, then she would be amenable to blood transfusion; continue to monitor for signs and symptoms of anemia   -- routine post-op/postpartum cares  -- encouraged to ambulate  -- anticipate discharge home in 1-2 days    Gisselle Oshea MD  [facility]

## 2021-10-02 NOTE — ANESTHESIA PREPROCEDURE EVALUATION
Anesthesia Pre-Procedure Evaluation    Patient: Malgorzata Ramachandran   MRN: [id number] : 1995        Preoperative Diagnosis: Previous  section [Z98.891]  Request for sterilization [Z30.2]   Procedure : Procedure(s):  REPEAT  SECTION WITH BILATERAL SALPINGECTOMY     Past Medical History:   Diagnosis Date     Abnormal Pap smear       Past Surgical History:   Procedure Laterality Date     ANESTH,LOWER ARM SKIN SURG       BIOPSY OF SKIN LESION        SECTION  2014      SECTION N/A 2017    Procedure:  SECTION;  Surgeon: Teodora Dodson DO;  Location: [facility] L[facility]     GYN SURGERY      c section x 2 in past      No Known Allergies   Social History     Tobacco Use     Smoking status: Never Smoker     Smokeless tobacco: Never Used   Substance Use Topics     Alcohol use: No     Alcohol/week: 0.0 standard drinks      Wt Readings from Last 1 Encounters:   10/01/21 102.1 kg (225 lb)        Anesthesia Evaluation   Pt has had prior anesthetic. Type: Regional.    No history of anesthetic complications       ROS/MED HX  ENT/Pulmonary:  - neg pulmonary ROS     Neurologic:  - neg neurologic ROS     Cardiovascular:  - neg cardiovascular ROS     METS/Exercise Tolerance:     Hematologic:  - neg hematologic  ROS     Musculoskeletal:  - neg musculoskeletal ROS     GI/Hepatic:  - neg GI/hepatic ROS     Renal/Genitourinary:  - neg Renal ROS     Endo:     (+) Obesity,     Psychiatric/Substance Use:  - neg psychiatric ROS     Infectious Disease:  - neg infectious disease ROS     Malignancy:       Other:      (+) Possibly pregnant, ,         Physical Exam    Airway        Mallampati: II   TM distance: > 3 FB   Neck ROM: full   Mouth opening: > 3 cm    Respiratory Devices and Support         Dental  no notable dental history         Cardiovascular   cardiovascular exam normal          Pulmonary   pulmonary exam normal                OUTSIDE LABS:  CBC:   Lab Results   Component Value  Date    WBC 11.4 (H) 09/07/2021    WBC 9.8 07/23/2021    HGB 11.3 (L) 10/01/2021    HGB 10.5 (L) 09/07/2021    HCT 31.4 (L) 09/07/2021    HCT 32.5 (L) 07/23/2021     09/07/2021     07/23/2021     BMP:   Lab Results   Component Value Date     09/07/2021     06/25/2021    POTASSIUM 3.8 09/07/2021    POTASSIUM 3.7 06/25/2021    CHLORIDE 110 (H) 09/07/2021    CHLORIDE 109 06/25/2021    CO2 22 09/07/2021    CO2 23 06/25/2021    BUN 9 09/07/2021    BUN 10 06/25/2021    CR 0.56 09/07/2021    CR 0.64 06/25/2021    GLC 98 09/07/2021     (H) 06/25/2021     COAGS: No results found for: PTT, INR, FIBR  POC:   Lab Results   Component Value Date    HCG Negative 11/10/2018    HCGS Negative 05/15/2017     HEPATIC:   Lab Results   Component Value Date    ALBUMIN 3.0 (L) 06/25/2021    PROTTOTAL 7.3 06/25/2021    ALT 17 06/25/2021    AST 12 06/25/2021    ALKPHOS 91 06/25/2021    BILITOTAL 0.1 (L) 06/25/2021     OTHER:   Lab Results   Component Value Date    LACT 0.6 09/29/2015    JOSESITO 9.0 09/07/2021    MAG 1.7 06/25/2021    LIPASE 69 (L) 03/31/2021    TSH 2.23 06/25/2021       Anesthesia Plan    ASA Status:  3, emergent    NPO Status:  ELEVATED Aspiration Risk/Unknown    Anesthesia Type: Spinal.              Consents    Anesthesia Plan(s) and associated risks, benefits, and realistic alternatives discussed. Questions answered and patient/representative(s) expressed understanding.     - Discussed with:  Patient      - Extended Intubation/Ventilatory Support Discussed: No.      - Patient is DNR/DNI Status: No    Use of blood products discussed: No .     Postoperative Care    Pain management: Oral pain medications, IV analgesics, intrathecal morphine.   PONV prophylaxis: Ondansetron (or other 5HT-3)     Comments:                Nasim Bennett MD

## 2021-10-02 NOTE — H&P
H&P Update 10/1/2021     No significant change in general health status based on examination of the patient, review of Nursing Admission Database and prenatal record.    26 year old  at 38w6d presented with painful contractions and cervical change, diagnosed with labor.  She has a history of CS x2, desires a repeat CS and BTL.  EFW AGA.  AB pos  GBS pos    Reviewed r/b/a of  delivery and bilateral salpingectomy including bleeding, infection, injury to surrounding organs, and risk of regret.  She signed the written consent form and agrees to proceed.    Admit for repeat CS and BTL    Rhona Lopez MD, MPH  [facility]

## 2021-10-02 NOTE — LACTATION NOTE
This note was copied from a baby's chart.  Lactation in to see patient. Reviewed breastfeeding education. Encouraged patient to nurse ever 2-3 hours. Nipples tender. Encouraged patient to call so writer can observe latch.  Called in for a feed. Assisted with re positioning, placing baby tummy to tummy. Swallows heard. Nice deep latch. Nipples tender, mother love cream given. Knows to call for questions or concerns.

## 2021-10-02 NOTE — PROGRESS NOTES
It was brought to my attention by the RN staff that patient's postoperative Hgb was not as previously reported to me as 6.3. In fact, patient did not have a Hgb ordered postoperatively.   Originally, I was told by a staff member that a critical Hgb level of 6.3 was reported for this patient.   When I counseled patient about this earlier in the morning, she elected to proceed with IV iron infusion therapy, for which she received. It was only after receiving this therapy, that I was informed about the error.   Talked to patient about this medical error and apologized to her for the error. Patient conveys understanding and expressed no complaints about it. Given ongoing dizzy spells, although much improved when I saw her this morning, I recommended that she continue on her previously ordered oral iron supplementation, and patient agrees to this.   In the meantime, her postoperative CBC was ordered and will follow-up and treat accordingly based on those results.     Gisselle Oshea MD  [facility]

## 2021-10-02 NOTE — PLAN OF CARE
Data: Patient admitted to room 401 at . Patient is a . Prenatal record reviewed.   OB History    Para Term  AB Living   4 2 2 0 1 2   SAB TAB Ectopic Multiple Live Births   1 0 0 0 2      # Outcome Date GA Lbr Farooq/2nd Weight Sex Delivery Anes PTL Lv   4 Current            3 Term 17 39w0d  3.66 kg (8 lb 1.1 oz) F CS-LTranv Spinal  OSMAN      Name: Enid      Apgar1: 9  Apgar5: 9   2 Term 14 41w1d  3.515 kg (7 lb 12 oz) F CS-Unspec Spinal  OSMAN      Complications: Fetal Intolerance      Name: Miah   1 2013 13w0d          .  Medical History:   Past Medical History:   Diagnosis Date     Abnormal Pap smear    .  Gestational age 38w6d. Vital signs per doc flowsheet. Fetal movement present. Patient reports Rule Out Labor   as reason for admission. Support persons  present.  Action: Verbal consent for EFM, external fetal monitors applied. Admission assessment completed. Patient and support persons educated on labor process. Patient instructed to report change in fetal movement, contractions, vaginal leaking of fluid or bleeding, abdominal pain, or any concerns related to the pregnancy to her nurse/physician. Patient oriented to room, call light in reach.   Response: Dr. Lopez informed of status. Plan per provider is prepare for c/section. Patient verbalized understanding of education and verbalized agreement with plan. Patient coping with labor via breathing.

## 2021-10-02 NOTE — PLAN OF CARE
Data: Malgorzata Ramachandran transferred to 430 via cart at 0150. Baby transferred via parent's arms.  Action: Receiving unit notified of transfer: Yes. Patient and family notified of room change. Report given to MICHAEL Barrera at 0220. Belongings sent to receiving unit. Accompanied by Registered Nurse. Oriented patient to surroundings. Call light within reach. ID bands double-checked with receiving RN.  Response: Patient tolerated transfer and is stable.    Patients mobililty level scored using the bedside mobility assistance tool (BMAT). Patient is at a mobility level test number: 1. Mobility equipment used: [employer]. Required assist of 2 staff members. Further use of BMAT scoring required.

## 2021-10-03 ENCOUNTER — HEALTH MAINTENANCE LETTER (OUTPATIENT)
Age: 26
End: 2021-10-03

## 2021-10-03 VITALS
HEART RATE: 107 BPM | WEIGHT: 225 LBS | TEMPERATURE: 97.7 F | DIASTOLIC BLOOD PRESSURE: 60 MMHG | OXYGEN SATURATION: 97 % | HEIGHT: 62 IN | BODY MASS INDEX: 41.41 KG/M2 | SYSTOLIC BLOOD PRESSURE: 107 MMHG | RESPIRATION RATE: 18 BRPM

## 2021-10-03 LAB — T PALLIDUM AB SER QL: NONREACTIVE

## 2021-10-03 PROCEDURE — 250N000013 HC RX MED GY IP 250 OP 250 PS 637: Performed by: OBSTETRICS & GYNECOLOGY

## 2021-10-03 PROCEDURE — 250N000011 HC RX IP 250 OP 636: Performed by: OBSTETRICS & GYNECOLOGY

## 2021-10-03 RX ORDER — IBUPROFEN 600 MG/1
600 TABLET, FILM COATED ORAL EVERY 6 HOURS PRN
Qty: 60 TABLET | Refills: 0 | Status: SHIPPED | OUTPATIENT
Start: 2021-10-03

## 2021-10-03 RX ORDER — OXYCODONE HYDROCHLORIDE 5 MG/1
5 TABLET ORAL EVERY 6 HOURS PRN
Qty: 12 TABLET | Refills: 0 | Status: SHIPPED | OUTPATIENT
Start: 2021-10-03 | End: 2021-10-06

## 2021-10-03 RX ORDER — ACETAMINOPHEN 500 MG
500-1000 TABLET ORAL EVERY 6 HOURS PRN
Qty: 60 TABLET | Refills: 0 | Status: SHIPPED | OUTPATIENT
Start: 2021-10-03

## 2021-10-03 RX ADMIN — ACETAMINOPHEN 975 MG: 325 TABLET, FILM COATED ORAL at 10:49

## 2021-10-03 RX ADMIN — ACETAMINOPHEN 975 MG: 325 TABLET, FILM COATED ORAL at 04:50

## 2021-10-03 RX ADMIN — IBUPROFEN 800 MG: 800 TABLET, FILM COATED ORAL at 10:49

## 2021-10-03 RX ADMIN — SENNOSIDES AND DOCUSATE SODIUM 1 TABLET: 50; 8.6 TABLET ORAL at 10:49

## 2021-10-03 RX ADMIN — ENOXAPARIN SODIUM 40 MG: 40 INJECTION SUBCUTANEOUS at 10:49

## 2021-10-03 RX ADMIN — OXYCODONE HYDROCHLORIDE 5 MG: 5 TABLET ORAL at 10:49

## 2021-10-03 RX ADMIN — IBUPROFEN 800 MG: 800 TABLET, FILM COATED ORAL at 04:50

## 2021-10-03 RX ADMIN — FERROUS GLUCONATE 324 MG: 324 TABLET ORAL at 10:49

## 2021-10-03 NOTE — PLAN OF CARE
Patient vital signs stable and meeting expected outcomes.  Breastfeeding well independently.  Up independently and voiding adequately.  Pain well controlled with tylenol and ibuprofen.  Incision CDI, open to air.   Able to perform all cares for self and infant.  Bonding well with baby.  Will continue to monitor.

## 2021-10-03 NOTE — PLAN OF CARE
Data: Vital signs within normal limits. Postpartum checks within normal limits - see flow record. Patient eating and drinking normally. Patient able to empty bladder independently and is up ambulating. No apparent signs of infection. Patient performing self cares, is able to care for infant and is breastfeeding every 2-3 hours. Patient is supplementing with formula as she stated baby is not latching well. Breastfeeding benefits and latching reviewed, educated on formula feeding.  Incision LATA.   Action: Patient medicated with tylenol, ibuprofen, and oxycodone during the shift for pain. See MAR. Patient education done, see flow record.  Response: Parents bonding well with baby.  at bedside, supportive.     Discharge instructions completed.  Patient states she understands all discharge instructions and all her questions have been answered.  Verbalizes she needs to return to clinic for follow up for herself in 6 weeks with Obgyn and for baby in 2-3 days with pediatrician.  She is caring for herself and her baby independently. [facility] to follow up. Prescriptions reviewed and given to patient.  Postpartum depression symptoms reviewed and encouraged frequent review of depression scale. Patient discharged with  and infant at 1230.

## 2021-10-03 NOTE — DISCHARGE SUMMARY
[facility] Post-Op / Progress Note         Assessment and Plan:    Assessment:   Post-operative day #2  Low transverse repeat  section  L&D complications: Repeat  section performed urgently after presenting in spontaneous labor      Doing well.  Clean wound without signs of infection.  No immediate surgical complications identified.  No excessive bleeding  Pain well-controlled.  Requesting discharge      Plan:   Discharge later today  Follow up 6 weeks  Scripts for ibuprofen and Tylenol provided           Interval History:   Doing well.  Pain is well-controlled.  No fevers.  No history of wound drainage, warmth or significant erythema.  Good appetite.  Denies chest pain, shortness of breath, nausea or vomiting.  Ambulatory.  Breastfeeding well.          Significant Problems:      Past Medical History:   Diagnosis Date     Abnormal Pap smear              Review of Systems:    The patient denies any chest pain, shortness of breath, excessive pain, fever, chills, purulent drainage from the wound, nausea or vomiting.          Medications:   All medications related to the patient's surgery have been reviewed          Physical Exam:     All vitals stable  Patient Vitals for the past 24 hrs:   BP Temp Temp src Pulse Resp SpO2   10/03/21 0000 108/57 97.7  F (36.5  C) Oral 79 18 --   10/02/21 1606 102/54 97.8  F (36.6  C) Oral 80 18 --   10/02/21 1145 110/60 -- -- 80 18 97 %   10/02/21 1100 104/52 -- -- 78 20 97 %   10/02/21 1045 103/50 -- -- 83 18 99 %   10/02/21 1030 110/51 -- -- 71 20 99 %   10/02/21 1015 111/60 98  F (36.7  C) Oral 83 18 100 %   10/02/21 0957 131/75 98.5  F (36.9  C) Oral 80 20 99 %     Wound clean and dry with minimal or no drainage.  Surrounding skin with minimal erythema.  Ext NT with 1+ edema bilat          Data:     Hemoglobin   Date Value Ref Range Status   10/02/2021 9.0 (L) 11.7 - 15.7 g/dL Final   10/01/2021 11.3 (L) 11.7 - 15.7 g/dL Final   2021 10.5 (L)  11.7 - 15.7 g/dL Final   07/23/2021 10.8 (L) 11.7 - 15.7 g/dL Final   06/25/2021 11.1 (L) 11.7 - 15.7 g/dL Final   03/31/2021 12.8 11.7 - 15.7 g/dL Final   02/26/2021 12.2 11.7 - 15.7 g/dL Final   02/14/2021 12.8 11.7 - 15.7 g/dL Final   04/30/2020 13.1 11.7 - 15.7 g/dL Final     -    Jerardo Huang MD  10/3/2021 8:10 AM

## 2021-10-03 NOTE — DISCHARGE INSTRUCTIONS
Postop  Birth Instructions  Follow up with Obgyn in 6 weeks in the clinic for postpartum check-up  [facility]: [phone]    Activity       Do not lift more than 10 pounds for 6 weeks after surgery.  Ask family and friends for help when you need it.    No driving until you have stopped taking your pain medications (usually two weeks after surgery).    No heavy exercise or activity for 6 weeks.  Don't do anything that will put a strain on your surgery site.    Don't strain when using the toilet.  Your care team may prescribe a stool softener if you have problems with your bowel movements.     To care for your incision:       Keep the incision clean and dry.    Do not soak your incision in water. No swimming or hot tubs until it has fully healed. You may soak in the bathtub if the water level is below your incision.    Do not use peroxide, gel, cream, lotion, or ointment on your incision.    Adjust your clothes to avoid pressure on your surgery site (check the elastic in your underwear for example).     You may see a small amount of clear or pink drainage and this is normal.  Check with your health care provider:       If the drainage increases or has an odor.    If the incision reddens, you have swelling, or develop a rash.    If you have increased pain and the medicine we prescribed doesn't help.    If you have a fever above 100.4 F (38 C) with or without chills when placing thermometer under your tongue.   The area around your incision (surgery wound), will feel numb.  This is normal. The numbness should go away in less than a year.     Keep your hands clean:  Always wash your hands before touching your incision (surgery wound). This helps reduce your risk of infection. If your hands aren't dirty, you may use an alcohol hand-rub to clean your hands. Keep your nails clean and short.    Call your healthcare provider if you have any of these symptoms:       You soak a sanitary pad with blood within 1  hour, or you see blood clots larger than a golf ball.    Bleeding that lasts more than 6 weeks.    Vaginal discharge that smells bad.    Severe pain, cramping or tenderness in your lower belly area.    A need to urinate more frequently (use the toilet more often), more urgently (use the toilet very quickly), or it burns when you urinate.    Nausea and vomiting.    Redness, swelling or pain around a vein in your leg.    Problems breastfeeding or a red or painful area on your breast.    Chest pain and cough or are gasping for air.    Problems with coping with sadness, anxiety or depression. If you have concerns about hurting yourself or the baby, call your provider immediately.      You have questions or concerns after you return home.

## 2021-10-03 NOTE — PROVIDER NOTIFICATION
10/03/21 1125   Provider Notification   Provider Name/Title Dr. Huang   Method of Notification Phone   Request Evaluate-Remote   Notification Reason Other     Patient is requesting oxycodone with her discharge medications d/t uncontrolled pain. Dr. Huang stated he would write a prescription for oxycodone.

## 2021-10-04 ENCOUNTER — NURSE TRIAGE (OUTPATIENT)
Dept: NURSING | Facility: CLINIC | Age: 26
End: 2021-10-04

## 2021-10-04 ENCOUNTER — ANESTHESIA (OUTPATIENT)
Dept: OBGYN | Facility: CLINIC | Age: 26
End: 2021-10-04
Payer: COMMERCIAL

## 2021-10-04 RX ORDER — OXYCODONE HYDROCHLORIDE 5 MG/1
5 TABLET ORAL EVERY 6 HOURS PRN
Qty: 12 TABLET | Refills: 0 | Status: SHIPPED | OUTPATIENT
Start: 2021-10-04 | End: 2021-10-07

## 2021-10-04 NOTE — TELEPHONE ENCOUNTER
Pt called stating she delivered Friday via  and was discharged yesterday. Pt reported she  was prescribed oxycodone for pain but didn't  from the pharmacy. Pt is reporting pain on her incision, rated 8 or 9/ pain level, and it is on and off. Pt reported Tylenol is not relieving the pain. Pt is requesting oxycodone prescription resent to [facility] pharmacy in [place].    RN paged on call provider via smart web received a return call from Dr Reina Kurtz, provider was informed and he sent the oxycodone prescription to the requested pharmacy.      RN called pt and informed her medication was sent to [facility] pharmacy in [facility], and she stated she will pick it up in the morning.        Jatinder Alcantar RN  [facility]              .      Reason for Disposition    [1] Caller has URGENT medication question about med that PCP or specialist prescribed AND [2] triager unable to answer question    Additional Information    Negative: Drug overdose and triager unable to answer question    Negative: Caller requesting information unrelated to medicine    Negative: Caller requesting a prescription for Strep throat and has a positive culture result    Negative: Rash while taking a medication or within 3 days of stopping it    Negative: Immunization reaction suspected    Negative: [1] Asthma and [2] having symptoms of asthma (cough, wheezing, etc.)    Negative: [1] Influenza symptoms AND [2] anti-viral med prescription request, such as Tamiflu    Negative: [1] Symptom of illness (e.g., headache, abdominal pain, earache, vomiting) AND [2] more than mild    Protocols used: MEDICATION QUESTION CALL-A-

## 2021-10-05 LAB
PATH REPORT.COMMENTS IMP SPEC: NORMAL
PATH REPORT.COMMENTS IMP SPEC: NORMAL
PATH REPORT.FINAL DX SPEC: NORMAL
PATH REPORT.GROSS SPEC: NORMAL
PATH REPORT.MICROSCOPIC SPEC OTHER STN: NORMAL
PATH REPORT.RELEVANT HX SPEC: NORMAL
PHOTO IMAGE: NORMAL

## 2021-11-18 ASSESSMENT — PATIENT HEALTH QUESTIONNAIRE - PHQ9: SUM OF ALL RESPONSES TO PHQ QUESTIONS 1-9: 0

## 2021-12-15 ENCOUNTER — MEDICAL CORRESPONDENCE (OUTPATIENT)
Dept: HEALTH INFORMATION MANAGEMENT | Facility: CLINIC | Age: 26
End: 2021-12-15
Payer: COMMERCIAL

## 2022-03-02 ENCOUNTER — PATIENT OUTREACH (OUTPATIENT)
Dept: OBGYN | Facility: CLINIC | Age: 27
End: 2022-03-02
Payer: COMMERCIAL

## 2022-03-02 DIAGNOSIS — R87.619 ABNORMAL PAP SMEAR OF CERVIX: ICD-10-CM

## 2022-04-29 NOTE — TELEPHONE ENCOUNTER
"Dr. Lopez,    Patient is lost to pap tracking follow-up. Attempts to contact pt have been made per reminder process and there has been no reply and/or no appt scheduled.      Geeta Pearce RN  Pap Tracking     H/O abnormal pap (no records)  8/2016 NIL pap  3/15/21 NIL pap @ age 26. Plan per provider: \"follow-up pap next year, and if normal she can go into the low risk pool\"  3/2/2022 Reminder MyChart  3/30/2022 Reminder call - spoke to pt  4/29/2022 Lost to follow-up for pap tracking          "

## 2022-05-15 ENCOUNTER — HEALTH MAINTENANCE LETTER (OUTPATIENT)
Age: 27
End: 2022-05-15

## 2022-05-25 ENCOUNTER — LAB REQUISITION (OUTPATIENT)
Dept: LAB | Facility: CLINIC | Age: 27
End: 2022-05-25

## 2022-05-25 PROCEDURE — 86481 TB AG RESPONSE T-CELL SUSP: CPT | Performed by: INTERNAL MEDICINE

## 2022-05-27 LAB
GAMMA INTERFERON BACKGROUND BLD IA-ACNC: 0.01 IU/ML
M TB IFN-G BLD-IMP: NEGATIVE
M TB IFN-G CD4+ BCKGRND COR BLD-ACNC: 9.99 IU/ML
MITOGEN IGNF BCKGRD COR BLD-ACNC: 0.04 IU/ML
MITOGEN IGNF BCKGRD COR BLD-ACNC: 0.05 IU/ML
QUANTIFERON MITOGEN: 10 IU/ML
QUANTIFERON NIL TUBE: 0.01 IU/ML
QUANTIFERON TB1 TUBE: 0.05 IU/ML
QUANTIFERON TB2 TUBE: 0.06

## 2022-09-10 ENCOUNTER — HEALTH MAINTENANCE LETTER (OUTPATIENT)
Age: 27
End: 2022-09-10

## 2023-01-09 VITALS
TEMPERATURE: 98.7 F | WEIGHT: 210 LBS | DIASTOLIC BLOOD PRESSURE: 65 MMHG | SYSTOLIC BLOOD PRESSURE: 119 MMHG | HEART RATE: 99 BPM | OXYGEN SATURATION: 99 % | RESPIRATION RATE: 20 BRPM | BODY MASS INDEX: 38.41 KG/M2

## 2023-01-09 PROCEDURE — 99285 EMERGENCY DEPT VISIT HI MDM: CPT | Mod: 25

## 2023-01-10 ENCOUNTER — HOSPITAL ENCOUNTER (EMERGENCY)
Facility: CLINIC | Age: 28
Discharge: HOME | End: 2023-01-10
Attending: EMERGENCY MEDICINE | Admitting: EMERGENCY MEDICINE
Payer: COMMERCIAL

## 2023-01-10 ENCOUNTER — APPOINTMENT (OUTPATIENT)
Dept: ULTRASOUND IMAGING | Facility: CLINIC | Age: 28
End: 2023-01-10
Attending: EMERGENCY MEDICINE
Payer: COMMERCIAL

## 2023-01-10 DIAGNOSIS — R10.31 ABDOMINAL PAIN, RIGHT LOWER QUADRANT: ICD-10-CM

## 2023-01-10 DIAGNOSIS — R11.0 NAUSEA: ICD-10-CM

## 2023-01-10 LAB
ALBUMIN SERPL BCG-MCNC: 4.3 G/DL (ref 3.5–5.2)
ALBUMIN UR-MCNC: 30 MG/DL
ALP SERPL-CCNC: 107 U/L (ref 35–104)
ALT SERPL W P-5'-P-CCNC: 17 U/L (ref 10–35)
AMORPH CRY #/AREA URNS HPF: ABNORMAL /HPF
ANION GAP SERPL CALCULATED.3IONS-SCNC: 11 MMOL/L (ref 7–15)
APPEARANCE UR: CLEAR
AST SERPL W P-5'-P-CCNC: 11 U/L (ref 10–35)
BILIRUB SERPL-MCNC: 0.2 MG/DL
BILIRUB UR QL STRIP: NEGATIVE
BUN SERPL-MCNC: 14.2 MG/DL (ref 6–20)
CALCIUM SERPL-MCNC: 9.1 MG/DL (ref 8.6–10)
CHLORIDE SERPL-SCNC: 105 MMOL/L (ref 98–107)
COLOR UR AUTO: YELLOW
CREAT SERPL-MCNC: 0.65 MG/DL (ref 0.51–0.95)
DEPRECATED HCO3 PLAS-SCNC: 24 MMOL/L (ref 22–29)
ERYTHROCYTE [DISTWIDTH] IN BLOOD BY AUTOMATED COUNT: 14.2 % (ref 10–15)
GFR SERPL CREATININE-BSD FRML MDRD: >90 ML/MIN/1.73M2
GLUCOSE SERPL-MCNC: 138 MG/DL (ref 70–99)
GLUCOSE UR STRIP-MCNC: NEGATIVE MG/DL
HCT VFR BLD AUTO: 35.3 % (ref 35–47)
HGB BLD-MCNC: 11.2 G/DL (ref 11.7–15.7)
HGB UR QL STRIP: NEGATIVE
HOLD SPECIMEN: NORMAL
KETONES UR STRIP-MCNC: ABNORMAL MG/DL
LEUKOCYTE ESTERASE UR QL STRIP: NEGATIVE
MCH RBC QN AUTO: 27.2 PG (ref 26.5–33)
MCHC RBC AUTO-ENTMCNC: 31.7 G/DL (ref 31.5–36.5)
MCV RBC AUTO: 86 FL (ref 78–100)
MUCOUS THREADS #/AREA URNS LPF: PRESENT /LPF
NITRATE UR QL: NEGATIVE
PH UR STRIP: 6 [PH] (ref 5–7)
PLATELET # BLD AUTO: 289 10E3/UL (ref 150–450)
POTASSIUM SERPL-SCNC: 3.9 MMOL/L (ref 3.4–5.3)
PROT SERPL-MCNC: 8.1 G/DL (ref 6.4–8.3)
RBC # BLD AUTO: 4.12 10E6/UL (ref 3.8–5.2)
RBC URINE: <1 /HPF
SODIUM SERPL-SCNC: 140 MMOL/L (ref 136–145)
SP GR UR STRIP: 1.01 (ref 1–1.03)
SQUAMOUS EPITHELIAL: 2 /HPF
UROBILINOGEN UR STRIP-MCNC: 2 MG/DL
WBC # BLD AUTO: 12.9 10E3/UL (ref 4–11)
WBC URINE: 2 /HPF

## 2023-01-10 PROCEDURE — 36415 COLL VENOUS BLD VENIPUNCTURE: CPT | Performed by: EMERGENCY MEDICINE

## 2023-01-10 PROCEDURE — 85027 COMPLETE CBC AUTOMATED: CPT | Performed by: EMERGENCY MEDICINE

## 2023-01-10 PROCEDURE — 250N000011 HC RX IP 250 OP 636: Performed by: EMERGENCY MEDICINE

## 2023-01-10 PROCEDURE — 81001 URINALYSIS AUTO W/SCOPE: CPT | Performed by: EMERGENCY MEDICINE

## 2023-01-10 PROCEDURE — 96374 THER/PROPH/DIAG INJ IV PUSH: CPT

## 2023-01-10 PROCEDURE — 80053 COMPREHEN METABOLIC PANEL: CPT | Performed by: EMERGENCY MEDICINE

## 2023-01-10 PROCEDURE — 96376 TX/PRO/DX INJ SAME DRUG ADON: CPT

## 2023-01-10 PROCEDURE — 76830 TRANSVAGINAL US NON-OB: CPT

## 2023-01-10 RX ORDER — ONDANSETRON 4 MG/1
4 TABLET, ORALLY DISINTEGRATING ORAL EVERY 8 HOURS PRN
Qty: 10 TABLET | Refills: 0 | Status: SHIPPED | OUTPATIENT
Start: 2023-01-10 | End: 2024-03-23

## 2023-01-10 RX ORDER — ONDANSETRON 2 MG/ML
4 INJECTION INTRAMUSCULAR; INTRAVENOUS ONCE
Status: COMPLETED | OUTPATIENT
Start: 2023-01-10 | End: 2023-01-10

## 2023-01-10 RX ADMIN — ONDANSETRON 4 MG: 2 INJECTION INTRAMUSCULAR; INTRAVENOUS at 02:31

## 2023-01-10 RX ADMIN — ONDANSETRON 4 MG: 2 INJECTION INTRAMUSCULAR; INTRAVENOUS at 00:49

## 2023-01-10 ASSESSMENT — ACTIVITIES OF DAILY LIVING (ADL): ADLS_ACUITY_SCORE: 37

## 2023-01-10 NOTE — ED TRIAGE NOTES
Pt arrives complaining of right lower abdominal pain. Started 3 hours ago. Tylenol at 8pm. Denies urinary symptoms. Denies pregnancy, tubal oct. 2021     Triage Assessment     Row Name 01/09/23 2158       Triage Assessment (Adult)    Airway WDL WDL       Respiratory WDL    Respiratory WDL WDL       Skin Circulation/Temperature WDL    Skin Circulation/Temperature WDL WDL       Cardiac WDL    Cardiac WDL WDL       Peripheral/Neurovascular WDL    Peripheral Neurovascular WDL WDL       Cognitive/Neuro/Behavioral WDL    Cognitive/Neuro/Behavioral WDL WDL

## 2023-01-10 NOTE — ED PROVIDER NOTES
History     Chief Complaint:  Abdominal Pain       The history is provided by the patient.      Malgorzata Ramachandran is a 28 year old female who presents with abdominal pain. Patient reports developed a sharp, constant, right lower abdominal pain around 1900 yesterday evening. She states that the pain radiates slightly to her back and to her pelvis. She adds that the pain started with a fever of 101, chills, decreased appetite, and nausea, while sitting on the couch. She states that she took Ibuprofen around 2100 with no relief. She denies blood in stool, diarrhea, dysuria, hematuria, vaginal discharge. She reports that she had a normal menstrual cycle 1.5 weeks ago. She states that she has had 3 C-sections, and a tubal ligation with all of her organs intact. She notes that when she got her ultrasound with her last pregnancy, she was told that there was a possible kidney stone but is unsure.    Independent Historian: patient provides historian     Review of External Notes: N/A     ROS:  Review of Systems   All other systems reviewed and are negative.    Allergies:  No Known Allergies     Medications:    Ferrous gluconate    Past Medical History:    Melanocytic nevus   Scoliosis  Decreased fetal movement     Past Surgical History:    C- section      Family History:    Mother- GDM    Social History:  Reports that she has never smoked. She has never used smokeless tobacco. She reports that she does not drink alcohol and does not use drugs.  Presents alone  Presents via private vehicle   PCP: Isaías - [place], [facility]     Physical Exam     Patient Vitals for the past 24 hrs:   BP Temp Temp src Pulse Resp SpO2 Weight   01/09/23 2151 119/65 98.7  F (37.1  C) Temporal 99 20 99 % 95.3 kg (210 lb)        Physical Exam  General: Resting on the bed.  Head: No obvious trauma to head.  Ears, Nose, Throat:  External ears normal.  Nose normal.    Eyes:  Conjunctivae clear.    CV: Regular rate and rhythm.  No  murmurs.      Respiratory: Effort normal and breath sounds normal.  No wheezing or crackles.   Gastrointestinal: Soft.  No distension. There is right suprapubic tenderness.  There is no rigidity, no rebound and no guarding.   Musculoskeletal: No cva tenderness   Neuro: Alert. Moving all extremities appropriately.  Normal speech.    Skin: Skin is warm and dry.  No rash noted.       Emergency Department Course   Imaging:  US Pelvis Cmplt w Transvag & Doppler LmtPel Duplex Limited   Final Result   IMPRESSION:     1.  Anteverted normal uterus. No fibroids. Normal endometrial stripe.      2.  Both ovaries are normal, with normal Doppler vascularity.      3.  Small amount of nonspecific dependent pelvic free fluid.                  Report per radiology    Laboratory:  Labs Ordered and Resulted from Time of ED Arrival to Time of ED Departure   ROUTINE UA WITH MICROSCOPIC REFLEX TO CULTURE - Abnormal       Result Value    Color Urine Yellow      Appearance Urine Clear      Glucose Urine Negative      Bilirubin Urine Negative      Ketones Urine Trace (*)     Specific Gravity Urine 1.015      Blood Urine Negative      pH Urine 6.0      Protein Albumin Urine 30 (*)     Urobilinogen Urine 2.0      Nitrite Urine Negative      Leukocyte Esterase Urine Negative      Mucus Urine Present (*)     Amorphous Crystals Urine Few (*)     RBC Urine <1      WBC Urine 2      Squamous Epithelials Urine 2 (*)    COMPREHENSIVE METABOLIC PANEL - Abnormal    Sodium 140      Potassium 3.9      Chloride 105      Carbon Dioxide (CO2) 24      Anion Gap 11      Urea Nitrogen 14.2      Creatinine 0.65      Calcium 9.1      Glucose 138 (*)     Alkaline Phosphatase 107 (*)     AST 11      ALT 17      Protein Total 8.1      Albumin 4.3      Bilirubin Total 0.2      GFR Estimate >90     CBC WITH PLATELETS - Abnormal    WBC Count 12.9 (*)     RBC Count 4.12      Hemoglobin 11.2 (*)     Hematocrit 35.3      MCV 86      MCH 27.2      MCHC 31.7      RDW 14.2       Platelet Count 289        Emergency Department Course & Assessments:     Interventions:  Medications   ondansetron (ZOFRAN) injection 4 mg (4 mg Intravenous Given 1/10/23 0049)   ondansetron (ZOFRAN) injection 4 mg (4 mg Intravenous Given 1/10/23 0231)      Independent Interpretation (X-rays, CTs, rhythm strip):  I reviewed ultrasound report by radiology    Consultations/Discussion of Management or Tests:  ED Course as of 01/10/23 0410   Tue Freddie 10, 2023   0200 I obtained history and examined the patient    409 I rechecked the patient and discussed discharge       Social Determinants of Health affecting care:  N/A    Disposition:  The patient was discharged to home.     Impression & Plan    Medical Decision Makin-year-old female with a history of prior C-sections presents with a right suprapubic tenderness.  Vitals are reassuring.  Broad differential was pursued including but not limited to ovarian cyst, torsion, PID, TOA, appendicitis, obstruction, perforation, UTI, nephrolithiasis, pyelonephritis, etc.  CBC with mild leukocytosis and no significant anemia.  BMP without acute electrolyte, metabolic or renal dysfunction.  UA showed no obvious signs of infection nor blood to indicate nephrolithiasis.  She had no significant flank pain to indicate nephrolithiasis either.  Given the nature of the pain being so low we opted to start with a pelvic ultrasound initially.  Pelvic ultrasound does not show any evidence of cyst, torsion, TOA etc.  Patient denies any abnormal discharge, denies any concerns for vaginitis, PID, etc.  No concerns about infection from her partner.  Her exam revealed on reassessment.  Mild persistent right lower quadrant tenderness.  We discussed that this may represent early appendicitis or other acute surgical pathology we discussed possible CT scan.  Overall she is feeling much better.  Her pain is pretty minimal.  Her abdomen is benign.  She opts to forego CT at this time which seems  appropriate.  I overall have low suspicion for acute surgical process.  We discussed return precautions and close follow-up.  She is agreeable this plan.  She was discharged home.    Diagnosis:    ICD-10-CM    1. Abdominal pain, right lower quadrant  R10.31       2. Nausea  R11.0            Discharge Medications:  New Prescriptions    ONDANSETRON (ZOFRAN ODT) 4 MG ODT TAB    Take 1 tablet (4 mg) by mouth every 8 hours as needed for nausea        Scribe Disclosure:  NATHEN, Marla Welsh, am serving as a scribe at 2:09 AM on 1/10/2023 to document services personally performed by Geeta Turner MD based on my observations and the provider's statements to me.     1/10/2023   Geeta Turner MD Bennett, Jennifer L, MD  01/10/23 0608

## 2023-06-03 ENCOUNTER — HEALTH MAINTENANCE LETTER (OUTPATIENT)
Age: 28
End: 2023-06-03

## 2024-03-22 ENCOUNTER — HOSPITAL ENCOUNTER (EMERGENCY)
Facility: CLINIC | Age: 29
Discharge: HOME | End: 2024-03-23
Attending: EMERGENCY MEDICINE | Admitting: EMERGENCY MEDICINE

## 2024-03-22 DIAGNOSIS — R10.31 RLQ ABDOMINAL PAIN: ICD-10-CM

## 2024-03-22 PROCEDURE — 99285 EMERGENCY DEPT VISIT HI MDM: CPT | Mod: 25

## 2024-03-22 RX ORDER — ONDANSETRON 2 MG/ML
4 INJECTION INTRAMUSCULAR; INTRAVENOUS EVERY 30 MIN PRN
Status: DISCONTINUED | OUTPATIENT
Start: 2024-03-22 | End: 2024-03-23 | Stop reason: HOSPADM

## 2024-03-22 ASSESSMENT — COLUMBIA-SUICIDE SEVERITY RATING SCALE - C-SSRS
6. HAVE YOU EVER DONE ANYTHING, STARTED TO DO ANYTHING, OR PREPARED TO DO ANYTHING TO END YOUR LIFE?: NO
2. HAVE YOU ACTUALLY HAD ANY THOUGHTS OF KILLING YOURSELF IN THE PAST MONTH?: NO
1. IN THE PAST MONTH, HAVE YOU WISHED YOU WERE DEAD OR WISHED YOU COULD GO TO SLEEP AND NOT WAKE UP?: NO

## 2024-03-23 ENCOUNTER — APPOINTMENT (OUTPATIENT)
Dept: CT IMAGING | Facility: CLINIC | Age: 29
End: 2024-03-23
Attending: EMERGENCY MEDICINE

## 2024-03-23 ENCOUNTER — APPOINTMENT (OUTPATIENT)
Dept: ULTRASOUND IMAGING | Facility: CLINIC | Age: 29
End: 2024-03-23
Attending: EMERGENCY MEDICINE

## 2024-03-23 VITALS
HEIGHT: 62 IN | OXYGEN SATURATION: 99 % | RESPIRATION RATE: 18 BRPM | BODY MASS INDEX: 39.88 KG/M2 | WEIGHT: 216.71 LBS | DIASTOLIC BLOOD PRESSURE: 68 MMHG | HEART RATE: 85 BPM | TEMPERATURE: 97.9 F | SYSTOLIC BLOOD PRESSURE: 125 MMHG

## 2024-03-23 LAB
ALBUMIN SERPL BCG-MCNC: 4.4 G/DL (ref 3.5–5.2)
ALBUMIN UR-MCNC: NEGATIVE MG/DL
ALP SERPL-CCNC: 85 U/L (ref 40–150)
ALT SERPL W P-5'-P-CCNC: 13 U/L (ref 0–50)
ANION GAP SERPL CALCULATED.3IONS-SCNC: 9 MMOL/L (ref 7–15)
APPEARANCE UR: CLEAR
AST SERPL W P-5'-P-CCNC: 13 U/L (ref 0–45)
BASOPHILS # BLD AUTO: 0 10E3/UL (ref 0–0.2)
BASOPHILS NFR BLD AUTO: 1 %
BILIRUB SERPL-MCNC: <0.2 MG/DL
BILIRUB UR QL STRIP: NEGATIVE
BUN SERPL-MCNC: 8.9 MG/DL (ref 6–20)
CALCIUM SERPL-MCNC: 9 MG/DL (ref 8.6–10)
CHLORIDE SERPL-SCNC: 104 MMOL/L (ref 98–107)
COLOR UR AUTO: ABNORMAL
CREAT SERPL-MCNC: 0.63 MG/DL (ref 0.51–0.95)
DEPRECATED HCO3 PLAS-SCNC: 26 MMOL/L (ref 22–29)
EGFRCR SERPLBLD CKD-EPI 2021: >90 ML/MIN/1.73M2
EOSINOPHIL # BLD AUTO: 0 10E3/UL (ref 0–0.7)
EOSINOPHIL NFR BLD AUTO: 1 %
ERYTHROCYTE [DISTWIDTH] IN BLOOD BY AUTOMATED COUNT: 13.8 % (ref 10–15)
GLUCOSE SERPL-MCNC: 93 MG/DL (ref 70–99)
GLUCOSE UR STRIP-MCNC: NEGATIVE MG/DL
HCG SERPL QL: NEGATIVE
HCT VFR BLD AUTO: 36.2 % (ref 35–47)
HGB BLD-MCNC: 11.7 G/DL (ref 11.7–15.7)
HGB UR QL STRIP: NEGATIVE
IMM GRANULOCYTES # BLD: 0 10E3/UL
IMM GRANULOCYTES NFR BLD: 0 %
KETONES UR STRIP-MCNC: NEGATIVE MG/DL
LEUKOCYTE ESTERASE UR QL STRIP: NEGATIVE
LYMPHOCYTES # BLD AUTO: 2.1 10E3/UL (ref 0.8–5.3)
LYMPHOCYTES NFR BLD AUTO: 27 %
MCH RBC QN AUTO: 27 PG (ref 26.5–33)
MCHC RBC AUTO-ENTMCNC: 32.3 G/DL (ref 31.5–36.5)
MCV RBC AUTO: 83 FL (ref 78–100)
MONOCYTES # BLD AUTO: 0.6 10E3/UL (ref 0–1.3)
MONOCYTES NFR BLD AUTO: 8 %
MUCOUS THREADS #/AREA URNS LPF: PRESENT /LPF
NEUTROPHILS # BLD AUTO: 5.1 10E3/UL (ref 1.6–8.3)
NEUTROPHILS NFR BLD AUTO: 63 %
NITRATE UR QL: NEGATIVE
NRBC # BLD AUTO: 0 10E3/UL
NRBC BLD AUTO-RTO: 0 /100
PH UR STRIP: 7 [PH] (ref 5–7)
PLATELET # BLD AUTO: 270 10E3/UL (ref 150–450)
POTASSIUM SERPL-SCNC: 3.6 MMOL/L (ref 3.4–5.3)
PROT SERPL-MCNC: 7.7 G/DL (ref 6.4–8.3)
RBC # BLD AUTO: 4.34 10E6/UL (ref 3.8–5.2)
RBC URINE: <1 /HPF
SODIUM SERPL-SCNC: 139 MMOL/L (ref 135–145)
SP GR UR STRIP: 1.02 (ref 1–1.03)
SQUAMOUS EPITHELIAL: 3 /HPF
UROBILINOGEN UR STRIP-MCNC: 2 MG/DL
WBC # BLD AUTO: 7.8 10E3/UL (ref 4–11)
WBC URINE: <1 /HPF

## 2024-03-23 PROCEDURE — 74177 CT ABD & PELVIS W/CONTRAST: CPT

## 2024-03-23 PROCEDURE — 81001 URINALYSIS AUTO W/SCOPE: CPT | Performed by: EMERGENCY MEDICINE

## 2024-03-23 PROCEDURE — 250N000011 HC RX IP 250 OP 636: Performed by: EMERGENCY MEDICINE

## 2024-03-23 PROCEDURE — 36415 COLL VENOUS BLD VENIPUNCTURE: CPT | Performed by: EMERGENCY MEDICINE

## 2024-03-23 PROCEDURE — 96376 TX/PRO/DX INJ SAME DRUG ADON: CPT

## 2024-03-23 PROCEDURE — 93976 VASCULAR STUDY: CPT

## 2024-03-23 PROCEDURE — 258N000003 HC RX IP 258 OP 636: Performed by: EMERGENCY MEDICINE

## 2024-03-23 PROCEDURE — 96374 THER/PROPH/DIAG INJ IV PUSH: CPT | Mod: 59

## 2024-03-23 PROCEDURE — 85025 COMPLETE CBC W/AUTO DIFF WBC: CPT | Performed by: EMERGENCY MEDICINE

## 2024-03-23 PROCEDURE — 80053 COMPREHEN METABOLIC PANEL: CPT | Performed by: EMERGENCY MEDICINE

## 2024-03-23 PROCEDURE — 96375 TX/PRO/DX INJ NEW DRUG ADDON: CPT

## 2024-03-23 PROCEDURE — 84703 CHORIONIC GONADOTROPIN ASSAY: CPT | Performed by: EMERGENCY MEDICINE

## 2024-03-23 PROCEDURE — 76830 TRANSVAGINAL US NON-OB: CPT

## 2024-03-23 PROCEDURE — 96361 HYDRATE IV INFUSION ADD-ON: CPT

## 2024-03-23 RX ORDER — IOPAMIDOL 755 MG/ML
500 INJECTION, SOLUTION INTRAVASCULAR ONCE
Status: COMPLETED | OUTPATIENT
Start: 2024-03-23 | End: 2024-03-23

## 2024-03-23 RX ORDER — ONDANSETRON 4 MG/1
4 TABLET, ORALLY DISINTEGRATING ORAL EVERY 8 HOURS PRN
Qty: 10 TABLET | Refills: 0 | Status: SHIPPED | OUTPATIENT
Start: 2024-03-23 | End: 2024-03-26

## 2024-03-23 RX ORDER — METOCLOPRAMIDE HYDROCHLORIDE 5 MG/ML
10 INJECTION INTRAMUSCULAR; INTRAVENOUS ONCE
Status: COMPLETED | OUTPATIENT
Start: 2024-03-23 | End: 2024-03-23

## 2024-03-23 RX ADMIN — METOCLOPRAMIDE HYDROCHLORIDE 10 MG: 5 INJECTION INTRAMUSCULAR; INTRAVENOUS at 02:49

## 2024-03-23 RX ADMIN — SODIUM CHLORIDE 1000 ML: 9 INJECTION, SOLUTION INTRAVENOUS at 00:31

## 2024-03-23 RX ADMIN — IOPAMIDOL 100 ML: 755 INJECTION, SOLUTION INTRAVENOUS at 01:17

## 2024-03-23 RX ADMIN — ONDANSETRON 4 MG: 2 INJECTION INTRAMUSCULAR; INTRAVENOUS at 01:32

## 2024-03-23 RX ADMIN — ONDANSETRON 4 MG: 2 INJECTION INTRAMUSCULAR; INTRAVENOUS at 00:31

## 2024-03-23 ASSESSMENT — ACTIVITIES OF DAILY LIVING (ADL)
ADLS_ACUITY_SCORE: 37

## 2024-03-23 NOTE — ED NOTES
I was asked to follow-up the ultrasound which is detailed below.  Patient is resting comfortably though remains nauseous.  No definite cause for her discomfort identified.  Return precautions discussed and she is comfortable with this plan.  I reviewed CT & labs prior to discharge.    US Pelvis Cmplt w Transvag & Doppler LmtPel Duplex Limited (Preliminary result)  Result time 24 02:49:33  Preliminary result by Nasim Marvin MD (24 02:49:33)                Impression:    IMPRESSION:    1.  Retroflexed uterus with a  scar in the lower uterine segment anteriorly. Small fluid collection measuring up to 1.9 cm seen adjacent to the  scar of uncertain but doubtful significance.    2.  Both ovaries are within normal limits. No evidence for torsion.    3.  Small-to-moderate amount of free fluid in the pelvis. This fluid contains some internal echoes suggesting complex fluid, nonspecific.                 Avila Barber MD  24 0435

## 2024-03-23 NOTE — ED PROVIDER NOTES
"  History     Chief Complaint:  Nausea, Diarrhea, and Groin Pain       HPI   Malgorzata Ramachandran is a 29 year old female who presents today with right lower quadrant pain of 24 hour duration.  Patient stated that she started feeling the pain last night but then was able to walk through the day with ibuprofen and Tylenol.  She thinks it may be an ovarian cyst that she was diagnosed with 2 years ago when she had her daughter.  Her OB told her she had a cyst but there was not anything else to be done at the point.  He denies any bleeding.  She denies any fever.  She did have some nausea and almost vomited but did not have any diarrhea.  Denies any urinary concerns.  Due to the pain worsening tonight, she decided come in.  Not currently pregnant      Independent Historian:   None - Patient Only    Review of External Notes:   none       Medications:    acetaminophen (TYLENOL) 500 MG tablet  ferrous gluconate (FERGON) 324 (38 Fe) MG tablet  ibuprofen (ADVIL/MOTRIN) 600 MG tablet  ondansetron (ZOFRAN ODT) 4 MG ODT tab  Prenatal Vit-Fe Fumarate-FA (PRENATAL VITAMIN) 27-0.8 MG TABS        Past Medical History:    Past Medical History:   Diagnosis Date    Abnormal Pap smear        Past Surgical History:    Past Surgical History:   Procedure Laterality Date    ANESTH,LOWER ARM SKIN SURG      BIOPSY OF SKIN LESION       SECTION  2014     SECTION N/A 2017    Procedure:  SECTION;  Surgeon: Teodora Dodson DO;  Location: [facility]    COMBINED  SECTION, SALPINGECTOMY BILATERAL Bilateral 10/1/2021    Procedure: REPEAT  SECTION WITH BILATERAL SALPINGECTOMY;  Surgeon: Rhona Lopez MD;  Location: [facility]    GYN SURGERY      c section x 2 in past        Physical Exam   Patient Vitals for the past 24 hrs:   BP Temp Temp src Pulse Resp SpO2 Height Weight   24 2333 (!) 127/96 97.9  F (36.6  C) Temporal 70 18 99 % 1.575 m (5' 2\") 98.3 kg (216 lb 11.4 oz)        Physical " Exam  Constitutional:       Appearance: She is well-developed.   HENT:      Right Ear: External ear normal.      Left Ear: External ear normal.      Mouth/Throat:      Mouth: Mucous membranes are moist.      Pharynx: Oropharynx is clear. No oropharyngeal exudate or posterior oropharyngeal erythema.   Eyes:      General: No scleral icterus.     Conjunctiva/sclera: Conjunctivae normal.      Pupils: Pupils are equal, round, and reactive to light.   Cardiovascular:      Rate and Rhythm: Normal rate and regular rhythm.      Heart sounds: Normal heart sounds. No murmur heard.     No friction rub. No gallop.   Pulmonary:      Effort: Pulmonary effort is normal. No respiratory distress.      Breath sounds: Normal breath sounds. No wheezing or rales.   Abdominal:      General: Bowel sounds are normal. There is no distension.      Palpations: Abdomen is soft. There is no mass.      Tenderness: There is abdominal tenderness. There is no right CVA tenderness or left CVA tenderness.      Comments: RLQ TTP, no rebound or guarding   Skin:     General: Skin is warm and dry.      Capillary Refill: Capillary refill takes less than 2 seconds.      Findings: No rash.   Neurological:      Mental Status: She is alert.           Emergency Department Course       Imaging:  CT Abdomen Pelvis w Contrast   Final Result   IMPRESSION:    1.  Normal appendix.      2.  Pelvic free fluid which may be physiologic in nature. Pelvic ultrasound could be considered in further evaluation.      US Pelvis Cmplt w Transvag & Doppler LmtPel Duplex Limited    (Results Pending)          Laboratory:  Labs Ordered and Resulted from Time of ED Arrival to Time of ED Departure   ROUTINE UA WITH MICROSCOPIC REFLEX TO CULTURE - Abnormal       Result Value    Color Urine Light Yellow      Appearance Urine Clear      Glucose Urine Negative      Bilirubin Urine Negative      Ketones Urine Negative      Specific Gravity Urine 1.025      Blood Urine Negative      pH  Urine 7.0      Protein Albumin Urine Negative      Urobilinogen Urine 2.0      Nitrite Urine Negative      Leukocyte Esterase Urine Negative      Mucus Urine Present (*)     RBC Urine <1      WBC Urine <1      Squamous Epithelials Urine 3 (*)    COMPREHENSIVE METABOLIC PANEL - Normal    Sodium 139      Potassium 3.6      Carbon Dioxide (CO2) 26      Anion Gap 9      Urea Nitrogen 8.9      Creatinine 0.63      GFR Estimate >90      Calcium 9.0      Chloride 104      Glucose 93      Alkaline Phosphatase 85      AST 13      ALT 13      Protein Total 7.7      Albumin 4.4      Bilirubin Total <0.2     HCG QUALITATIVE PREGNANCY - Normal    hCG Serum Qualitative Negative     CBC WITH PLATELETS AND DIFFERENTIAL    WBC Count 7.8      RBC Count 4.34      Hemoglobin 11.7      Hematocrit 36.2      MCV 83      MCH 27.0      MCHC 32.3      RDW 13.8      Platelet Count 270      % Neutrophils 63      % Lymphocytes 27      % Monocytes 8      % Eosinophils 1      % Basophils 1      % Immature Granulocytes 0      NRBCs per 100 WBC 0      Absolute Neutrophils 5.1      Absolute Lymphocytes 2.1      Absolute Monocytes 0.6      Absolute Eosinophils 0.0      Absolute Basophils 0.0      Absolute Immature Granulocytes 0.0      Absolute NRBCs 0.0              Emergency Department Course & Assessments:    Interventions:  Medications   ondansetron (ZOFRAN) injection 4 mg (4 mg Intravenous $Given 3/23/24 0132)   sodium chloride 0.9% BOLUS 1,000 mL (1,000 mLs Intravenous $New Bag 3/23/24 0031)   sodium chloride (PF) 0.9% PF flush 100 mL (65 mLs Intravenous $Given 3/23/24 0115)   iopamidol (ISOVUE-370) solution 500 mL (100 mLs Intravenous $Given 3/23/24 0117)        Assessments:  2356 Exam    Independent Interpretation (X-rays, CTs, rhythm strip):  None    Consultations/Discussion of Management or Tests:  None        Social Determinants of Health affecting care:   None    Disposition:  The patient care transferred to  pending US results      Impression & Plan        MIPS (If applicable):  N/A    Medical Decision Making:  Patient presents today for progressive worsening RLQ pain. On exam, she is tender over the RLQ but did not have rebound or guarding. CT was obtained and ruled out appendicitis. No ovarian cyst was seen but there is some pelvic fluid. She is sent for pelvic ultrasound. Result is pending. Her result will be follow up by . If there is an ovarian cyst, patient will follow up with her ob/gyn .       Diagnosis:    ICD-10-CM    1. RLQ abdominal pain  R10.31                3/22/2024   Alejandra Pena MD Cheng, Wenlan, MD  03/23/24 1934

## 2024-03-23 NOTE — ED TRIAGE NOTES
Presents to triage with c/o R sided groin pain, nausea, and diarrhea that started this morning. Patient states she has a known ovarian cyst on the R side and thinks this may be causing her pain.

## 2024-07-07 ENCOUNTER — HEALTH MAINTENANCE LETTER (OUTPATIENT)
Age: 29
End: 2024-07-07

## 2024-07-28 ENCOUNTER — ANCILLARY PROCEDURE (OUTPATIENT)
Dept: GENERAL RADIOLOGY | Facility: CLINIC | Age: 29
End: 2024-07-28
Attending: NURSE PRACTITIONER

## 2024-07-28 ENCOUNTER — OFFICE VISIT (OUTPATIENT)
Dept: URGENT CARE | Facility: URGENT CARE | Age: 29
End: 2024-07-28

## 2024-07-28 VITALS
WEIGHT: 218 LBS | BODY MASS INDEX: 39.87 KG/M2 | HEART RATE: 94 BPM | OXYGEN SATURATION: 99 % | SYSTOLIC BLOOD PRESSURE: 116 MMHG | DIASTOLIC BLOOD PRESSURE: 76 MMHG | TEMPERATURE: 99 F | RESPIRATION RATE: 18 BRPM

## 2024-07-28 DIAGNOSIS — S62.634A CLOSED DISPLACED FRACTURE OF DISTAL PHALANX OF RIGHT RING FINGER, INITIAL ENCOUNTER: Primary | ICD-10-CM

## 2024-07-28 DIAGNOSIS — S67.21XA HAND CRUSH INJURY, RIGHT, INITIAL ENCOUNTER: ICD-10-CM

## 2024-07-28 DIAGNOSIS — M79.644 PAIN OF FINGER OF RIGHT HAND: ICD-10-CM

## 2024-07-28 PROCEDURE — 73130 X-RAY EXAM OF HAND: CPT | Mod: TC | Performed by: RADIOLOGY

## 2024-07-28 PROCEDURE — 96372 THER/PROPH/DIAG INJ SC/IM: CPT | Performed by: NURSE PRACTITIONER

## 2024-07-28 PROCEDURE — 99203 OFFICE O/P NEW LOW 30 MIN: CPT | Mod: 25 | Performed by: NURSE PRACTITIONER

## 2024-07-28 RX ORDER — KETOROLAC TROMETHAMINE 30 MG/ML
30 INJECTION, SOLUTION INTRAMUSCULAR; INTRAVENOUS ONCE
Status: COMPLETED | OUTPATIENT
Start: 2024-07-28 | End: 2024-07-28

## 2024-07-28 RX ADMIN — KETOROLAC TROMETHAMINE 30 MG: 30 INJECTION, SOLUTION INTRAMUSCULAR; INTRAVENOUS at 12:58

## 2024-07-28 ASSESSMENT — PAIN SCALES - GENERAL: PAINLEVEL: EXTREME PAIN (8)

## 2024-07-28 NOTE — PROGRESS NOTES
Assessment & Plan       [facility] BAR[facility] Barreto is a 29 year old female who presents to clinic today for the following health issues:  Chief Complaint   Patient presents with    Urgent Care    Hand Injury     Concerned about finger fracture onset 20 minutes ago - reports pain at 7/10   Child landed on finger while trying to catch her - Right hand ring finger swelling and pain      {(!) Visit Details have not yet been documented.  Please enter Visit Details and then use this list to pull in documentation. (Optional):676799}  HPI    ***  {UC Conditions (Optional):729441}    Review of Systems  {ROS COMP (Optional):029690}      Objective    /76 (BP Location: Left arm, Patient Position: Sitting, Cuff Size: Adult Large)   Pulse 94   Temp 99  F (37.2  C) (Tympanic)   Resp 18   Wt 98.9 kg (218 lb)   SpO2 99%   Breastfeeding No   BMI 39.87 kg/m    Physical Exam   {Exam List (Optional):418531}    {Diagnostic Test Results (Optional):188287}

## 2024-07-28 NOTE — PROGRESS NOTES
Assessment & Plan     1. Closed displaced fracture of distal phalanx of right ring finger, initial encounter    - Orthopedic  Referral; Future    2. Pain of finger of right hand    - ketorolac (TORADOL) injection 30 mg  - XR Hand Right G/E 3 Views  - Orthopedic  Referral; Future    3. Hand crush injury, right, initial encounter    - ketorolac (TORADOL) injection 30 mg  - XR Hand Right G/E 3 Views  - Orthopedic  Referral; Future    Finger placed in splint with recommendations follow-up with orthopedic within the next day or 2.  Patient will elevate ice Tylenol may be  she was given a course taken at home she was given Toradol in clinic today which she states is helpful.  May restart NSAID or ibuprofen later today we discussed waiting 6 hours after Toradol dose patient verbalized understanding and agreement with this plan we discussed red flag symptoms that would warrant emergent or urgent evaluation    Jovanna Gutierrez [facility] [place] is a 29 year old female who presents to clinic today for the following health issues:  Chief Complaint   Patient presents with    Urgent Care    Hand Injury     Concerned about finger fracture onset 20 minutes ago - reports pain at 7/10   Child landed on finger while trying to catch her - Right hand ring finger swelling and pain        HPI      MS Injury/Pain    Onset of symptoms was 1 hour(s) ago.  Location: right finger  fourth  Context:       The injury happened while at home      Mechanism: direct blow      Patient experienced immediate pain, delayed swelling, was able to bear weight directly after injury, no deformity was noted by the patient  Course of symptoms is worsening.    Severity moderate  Current and Associated symptoms: Pain, Decreased range of motion, and Stiffness  Denies  Swelling, Bruising, Warmth, and Redness  Aggravating Factors: movement  Therapies to improve symptoms include:  none  This is the first time this type of problem has occurred for this patient.       Review of Systems  Constitutional, HEENT, cardiovascular, pulmonary, gi and gu systems are negative, except as otherwise noted.      Objective    /76 (BP Location: Left arm, Patient Position: Sitting, Cuff Size: Adult Large)   Pulse 94   Temp 99  F (37.2  C) (Tympanic)   Resp 18   Wt 98.9 kg (218 lb)   SpO2 99%   Breastfeeding No   BMI 39.87 kg/m    Physical Exam   GENERAL: alert and no distress  MS: Right hand fourth finger pain and decreased range of motion cms intact normal pulses   SKIN: no suspicious lesions or rashes    Results for orders placed or performed in visit on 07/28/24   XR Hand Right G/E 3 Views     Status: None    Narrative    EXAM: XR HAND RIGHT G/E 3 VIEWS  LOCATION: [facility] [place]  DATE: 7/28/2024    INDICATION: Pain of finger of right hand, hand crush injury, right, initial encounter.  COMPARISON: None.      Impression    IMPRESSION: Comminuted intra-articular fracture of the distal end of the middle phalanx of the ring finger. There is up to 3 mm of displacement. There is also mild degree of impaction. No angulation.

## 2024-07-29 ENCOUNTER — TELEPHONE (OUTPATIENT)
Dept: URGENT CARE | Facility: URGENT CARE | Age: 29
End: 2024-07-29

## 2024-07-29 ENCOUNTER — TELEPHONE (OUTPATIENT)
Dept: ORTHOPEDICS | Facility: CLINIC | Age: 29
End: 2024-07-29

## 2024-07-29 NOTE — TELEPHONE ENCOUNTER
Huddled with sports medicine provider, Dr Yeo, and this needs to see hand surgery.    Dr Huffman has a PAO1 spot in [place] tomorrow, 7/30, at 9:00 am.     Routing to provider to see if OK to use PAO1 spot.     Jeannine Carter, LOU, LAT, ATC  Certified Athletic Trainer

## 2024-07-29 NOTE — TELEPHONE ENCOUNTER
What is the Concern:  Fracture  Date the concern started: 7/28  Injury related? yes  Is this related to recent surgery?no  Laceration?  No  Body part affected:  Displaced right ring finger 1-2 day referral and nothing available until mid Aug, can we get her in with that time frame?  Has Patient been evaluated for condition? yes  Location the patient was evaluated and treated for the condition?   , Location [place]  Who is referring provider, (name and clinic location) Jovanna Gutierrez CNP  What images have been done? X-Ray; Location and City where images were taken:     Could we send this information to you in [employer] or would you prefer to receive a phone call?:   Patient would prefer a phone call   Okay to leave a detailed message?: Yes at Cell number on file:    Telephone Information:   Mobile [phone]

## 2024-07-29 NOTE — TELEPHONE ENCOUNTER
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     Patient was seen at urgent care on 07/28/2024 christophe Gutierrez. Patient doesn't have primary care provider to send work request to.     Patient would like letter sent to her email - [email or web address]     Routing to [place] urgent care provider and support staff to please follow up.    Thank you,  Natan Navarrete, Triage RN [place]nie [place]  9:06 AM 7/29/2024

## 2024-07-30 ENCOUNTER — OFFICE VISIT (OUTPATIENT)
Dept: ORTHOPEDICS | Facility: CLINIC | Age: 29
End: 2024-07-30

## 2024-07-30 VITALS
DIASTOLIC BLOOD PRESSURE: 54 MMHG | SYSTOLIC BLOOD PRESSURE: 127 MMHG | WEIGHT: 219.9 LBS | BODY MASS INDEX: 40.46 KG/M2 | HEIGHT: 62 IN

## 2024-07-30 DIAGNOSIS — S62.624A CLOSED DISPLACED FRACTURE OF MIDDLE PHALANX OF RIGHT RING FINGER, INITIAL ENCOUNTER: Primary | ICD-10-CM

## 2024-07-30 DIAGNOSIS — S62.634A CLOSED DISPLACED FRACTURE OF DISTAL PHALANX OF RIGHT RING FINGER, INITIAL ENCOUNTER: ICD-10-CM

## 2024-07-30 DIAGNOSIS — S67.21XA HAND CRUSH INJURY, RIGHT, INITIAL ENCOUNTER: ICD-10-CM

## 2024-07-30 DIAGNOSIS — M79.644 PAIN OF FINGER OF RIGHT HAND: ICD-10-CM

## 2024-07-30 PROCEDURE — 99204 OFFICE O/P NEW MOD 45 MIN: CPT | Performed by: STUDENT IN AN ORGANIZED HEALTH CARE EDUCATION/TRAINING PROGRAM

## 2024-07-30 ASSESSMENT — PAIN SCALES - GENERAL: PAINLEVEL: SEVERE PAIN (6)

## 2024-07-30 NOTE — LETTER
July 30, 2024      Malgorzata Ramachandran  [address] [address]        To Whom It May Concern:    Malgorzata Ramachandran was seen in our clinic for injury to right ring finger. She may return to work with the following: no gripping with right small or ring fingers, ok to work light duty with use of left hand only. These restrictions will be in place for 6 weeks.       Sincerely,      Joseph Huffman

## 2024-07-30 NOTE — TELEPHONE ENCOUNTER
Called and spoke to patient. She states she is able to come in for a 9 am appointment.     She asks what will be done at the appointment and writer informed her that she would be meeting with a hand surgeon to discuss treatment options.    She states she does not have insurance and wonders what she would owe today. Writer informed her there would be no copay today and when she gets insurance, she should be able to back date the visits but writer couldn't guarantee. She was understanding of this.     She will leave now and come for a 9 am appointment.     Jeannine Carter, LOU, LAT, ATC  Certified Athletic Trainer

## 2024-07-30 NOTE — LETTER
2024      Malgorzata Ramachandran  [address] [facility] [address]      Dear Colleague,    Thank you for referring your patient, Malgorzata Ramachandran, to the [facility] [place]. Please see a copy of my visit note below.    Orthopaedic Surgery Hand and Upper Extremity Clinic H&P Note:  Date: 2024     Patient Name: Malgorzata Ramachandran  MRN: [id number]    Consult requested by: Jovanna Gutierrez    CHIEF COMPLAINT: right ring finger fracture    Dominant Hand: right  Occupation: EMT      HPI:  Ms. Malgorzata Ramachandran is a 29 year old female right hand dominant who presents with right ring finger distal phalanx fracture. Injury occurred on 24 when her child landed on her finger while patient was attempting to stop her from falling. She was seen at  on day of injury at which time XR were taken and she was placed in a splint. She is taking Tylenol, icing, and wearing an alumafoam splint.     PMH  Diabetes: no  Thyroid Problems: no  Smoking: no      PAST MEDICAL HISTORY:  Past Medical History:   Diagnosis Date     Abnormal Pap smear        PAST SURGICAL HISTORY:  Past Surgical History:   Procedure Laterality Date     ANESTH,LOWER ARM SKIN SURG       BIOPSY OF SKIN LESION        SECTION  2014      SECTION N/A 2017    Procedure:  SECTION;  Surgeon: Teodora Dodson DO;  Location: [facility]     COMBINED  SECTION, SALPINGECTOMY BILATERAL Bilateral 10/1/2021    Procedure: REPEAT  SECTION WITH BILATERAL SALPINGECTOMY;  Surgeon: Rhona Lopez MD;  Location: [facility]     GYN SURGERY      c section x 2 in past       MEDICATIONS:  Current Outpatient Medications   Medication Sig Dispense Refill     acetaminophen (TYLENOL) 500 MG tablet Take 1-2 tablets (500-1,000 mg) by mouth every 6 hours as needed for mild pain (Patient not taking: Reported on 2024) 60 tablet 0     ferrous gluconate (FERGON) 324 (38 Fe)  MG tablet Take 1 tablet (324 mg) by mouth daily (with breakfast) (Patient not taking: Reported on 7/28/2024) 60 tablet 3     ibuprofen (ADVIL/MOTRIN) 600 MG tablet Take 1 tablet (600 mg) by mouth every 6 hours as needed for moderate pain (Patient not taking: Reported on 7/28/2024) 60 tablet 0     Prenatal Vit-Fe Fumarate-FA (PRENATAL VITAMIN) 27-0.8 MG TABS  (Patient not taking: Reported on 7/28/2024)       No current facility-administered medications for this visit.       ALLERGIES:   No Known Allergies    FAMILY HISTORY:  No pertinent family history    SOCIAL HISTORY:  Social History     Tobacco Use     Smoking status: Never     Smokeless tobacco: Never   Vaping Use     Vaping status: Never Used   Substance Use Topics     Alcohol use: No     Alcohol/week: 0.0 standard drinks of alcohol     Drug use: No       The patient's past medical, family, and social history was reviewed and confirmed.    REVIEW OF SYMPTOMS:      General: Negative   Eyes: Negative   Ear, Nose and Throat: Negative   Respiratory: Negative   Cardiovascular: Negative   Gastrointestinal: Negative   Genito-urinary: Negative   Musculoskeletal: see HPI  Neurological: Negative   Psychological: Negative  HEME: Negative   ENDO: Negative   SKIN: Negative    VITALS:  There were no vitals filed for this visit.    EXAM:  General: NAD, A&Ox3  HEENT: NC/AT  CV: RRR by peripheral pulse  Pulmonary: Non-labored breathing on RA  Right upper extremity:  Swelling over the DIP joint of the right ring finger  There is diminished active flexion, no extensor lag, slight malrotation of the finger with flexion  Quite tender to palpation  EDC, FDP, FDS are intact  Sensation is intact to light touch all distributions   warm well-perfused, capillary refill less than 2 seconds       IMAGING:    X-rays of the right hand 7/28/2024 demonstrates a displaced complete articular fracture of the ring finger middle phalangeal head at the DIP joint    I have personally reviewed the  above images and labs.         IMPRESSION AND RECOMMENDATIONS:  Ms. Malgorzata Ramachandran is a 29 year old female right hand dominant with right ring finger middle phalanx fracture at the DIP joint    I showed the patient her radiographs and discussed the diagnosis, prognosis, and treatment options.  We discussed both operative as well as nonoperative treatment.  I did advise that with nonoperative treatment, she likely would have mild residual coronal deformity or malrotation and likely would have diminished active flexion of the DIP joint.  She likely also would develop arthritis degenerative change in the long-term.  If this becomes painful, it can be fused in the future.    Surgery would be in the form of closed versus open reduction internal fixation.  We discussed screw fixation for the articular fragments as well as possible pinning.  I advised that this would provide her the best chance to restore the architecture of the joint and prevent early arthritis.  However she likely would have stiffness of the DIP joint with a possible extensor lag.  It would take her several months of diligent hand therapy to regain full range of motion.    The indications for surgery were discussed with the patient. The benefits, risks, and alternatives of operative management were discussed in detail with the patient. The patient understands that the risks of surgery include, but are not limited to: infection, bleeding, injury to nearby structures (such as nerves, blood vessels, and tendons), nonunion, malunion, hardware failure/irritation or breakage, need for additional surgery, pain, stiffness, scarring, need for rehabilitation, and anesthetic complications.  Patient expressed understanding and elected to proceed with surgery. All questions were answered to the patient's satisfaction.    Case request placed.  Local plus KACI Huffman MD    Hand, Upper Extremity & Microvascular Surgery  Department  of Orthopaedic Surgery  [facility]           Again, thank you for allowing me to participate in the care of your patient.        Sincerely,        Joseph Huffman MD

## 2024-07-30 NOTE — PATIENT INSTRUCTIONS
Thank you for choosing [facility]    Dr. Huffman Locations:    [facility] [address], [address]  [address] [address]   Appointments: [phone] Appointments: [phone]   Fax: [phone] Fax: [phone]     The surgery schedulers will contact you to coordinate your surgery date. If you have any questions you may reach surgery scheduling at [phone].     If you have any questions for Dr. Huffman please call the office at [phone] or send a [employer] message.

## 2024-07-30 NOTE — PROGRESS NOTES
Orthopaedic Surgery Hand and Upper Extremity Clinic H&P Note:  Date: 2024     Patient Name: Malgorzata Ramachandran  MRN: [id number]    Consult requested by: Jovanna Gutierrez    CHIEF COMPLAINT: right ring finger fracture    Dominant Hand: right  Occupation: EMT      HPI:  Ms. Malgorzata Ramachandran is a 29 year old female right hand dominant who presents with right ring finger distal phalanx fracture. Injury occurred on 24 when her child landed on her finger while patient was attempting to stop her from falling. She was seen at  on day of injury at which time XR were taken and she was placed in a splint. She is taking Tylenol, icing, and wearing an alumafoam splint.     PMH  Diabetes: no  Thyroid Problems: no  Smoking: no      PAST MEDICAL HISTORY:  Past Medical History:   Diagnosis Date    Abnormal Pap smear        PAST SURGICAL HISTORY:  Past Surgical History:   Procedure Laterality Date    ANESTH,LOWER ARM SKIN SURG      BIOPSY OF SKIN LESION       SECTION  2014     SECTION N/A 2017    Procedure:  SECTION;  Surgeon: Teodora Dodson DO;  Location: [facility]    COMBINED  SECTION, SALPINGECTOMY BILATERAL Bilateral 10/1/2021    Procedure: REPEAT  SECTION WITH BILATERAL SALPINGECTOMY;  Surgeon: Rhona Lopez MD;  Location: [facility]    GYN SURGERY      c section x 2 in past       MEDICATIONS:  Current Outpatient Medications   Medication Sig Dispense Refill    acetaminophen (TYLENOL) 500 MG tablet Take 1-2 tablets (500-1,000 mg) by mouth every 6 hours as needed for mild pain (Patient not taking: Reported on 2024) 60 tablet 0    ferrous gluconate (FERGON) 324 (38 Fe) MG tablet Take 1 tablet (324 mg) by mouth daily (with breakfast) (Patient not taking: Reported on 2024) 60 tablet 3    ibuprofen (ADVIL/MOTRIN) 600 MG tablet Take 1 tablet (600 mg) by mouth every 6 hours as needed for moderate pain (Patient not taking: Reported on  7/28/2024) 60 tablet 0    Prenatal Vit-Fe Fumarate-FA (PRENATAL VITAMIN) 27-0.8 MG TABS  (Patient not taking: Reported on 7/28/2024)       No current facility-administered medications for this visit.       ALLERGIES:   No Known Allergies    FAMILY HISTORY:  No pertinent family history    SOCIAL HISTORY:  Social History     Tobacco Use    Smoking status: Never    Smokeless tobacco: Never   Vaping Use    Vaping status: Never Used   Substance Use Topics    Alcohol use: No     Alcohol/week: 0.0 standard drinks of alcohol    Drug use: No       The patient's past medical, family, and social history was reviewed and confirmed.    REVIEW OF SYMPTOMS:      General: Negative   Eyes: Negative   Ear, Nose and Throat: Negative   Respiratory: Negative   Cardiovascular: Negative   Gastrointestinal: Negative   Genito-urinary: Negative   Musculoskeletal: see HPI  Neurological: Negative   Psychological: Negative  HEME: Negative   ENDO: Negative   SKIN: Negative    VITALS:  There were no vitals filed for this visit.    EXAM:  General: NAD, A&Ox3  HEENT: NC/AT  CV: RRR by peripheral pulse  Pulmonary: Non-labored breathing on RA  Right upper extremity:  Swelling over the DIP joint of the right ring finger  There is diminished active flexion, no extensor lag, slight malrotation of the finger with flexion  Quite tender to palpation  EDC, FDP, FDS are intact  Sensation is intact to light touch all distributions   warm well-perfused, capillary refill less than 2 seconds       IMAGING:    X-rays of the right hand 7/28/2024 demonstrates a displaced complete articular fracture of the ring finger middle phalangeal head at the DIP joint    I have personally reviewed the above images and labs.         IMPRESSION AND RECOMMENDATIONS:  Ms. Malgorzata Ramachandran is a 29 year old female right hand dominant with right ring finger middle phalanx fracture at the DIP joint    I showed the patient her radiographs and discussed the diagnosis, prognosis,  and treatment options.  We discussed both operative as well as nonoperative treatment.  I did advise that with nonoperative treatment, she likely would have mild residual coronal deformity or malrotation and likely would have diminished active flexion of the DIP joint.  She likely also would develop arthritis degenerative change in the long-term.  If this becomes painful, it can be fused in the future.    Surgery would be in the form of closed versus open reduction internal fixation.  We discussed screw fixation for the articular fragments as well as possible pinning.  I advised that this would provide her the best chance to restore the architecture of the joint and prevent early arthritis.  However she likely would have stiffness of the DIP joint with a possible extensor lag.  It would take her several months of diligent hand therapy to regain full range of motion.    The indications for surgery were discussed with the patient. The benefits, risks, and alternatives of operative management were discussed in detail with the patient. The patient understands that the risks of surgery include, but are not limited to: infection, bleeding, injury to nearby structures (such as nerves, blood vessels, and tendons), nonunion, malunion, hardware failure/irritation or breakage, need for additional surgery, pain, stiffness, scarring, need for rehabilitation, and anesthetic complications.  Patient expressed understanding and elected to proceed with surgery. All questions were answered to the patient's satisfaction.    Case request placed.  [employer] [facility] KACI Huffman MD    Hand, Upper Extremity & Microvascular Surgery  Department of Orthopaedic Surgery  [facility]

## 2024-07-30 NOTE — H&P (VIEW-ONLY)
Orthopaedic Surgery Hand and Upper Extremity Clinic H&P Note:  Date: 2024     Patient Name: Malgorzata Ramachandran  MRN: [id number]    Consult requested by: Jovanna Gutierrez    CHIEF COMPLAINT: right ring finger fracture    Dominant Hand: right  Occupation: EMT      HPI:  Ms. Malgorzata Ramachandran is a 29 year old female right hand dominant who presents with right ring finger distal phalanx fracture. Injury occurred on 24 when her child landed on her finger while patient was attempting to stop her from falling. She was seen at  on day of injury at which time XR were taken and she was placed in a splint. She is taking Tylenol, icing, and wearing an alumafoam splint.     PMH  Diabetes: no  Thyroid Problems: no  Smoking: no      PAST MEDICAL HISTORY:  Past Medical History:   Diagnosis Date    Abnormal Pap smear        PAST SURGICAL HISTORY:  Past Surgical History:   Procedure Laterality Date    ANESTH,LOWER ARM SKIN SURG      BIOPSY OF SKIN LESION       SECTION  2014     SECTION N/A 2017    Procedure:  SECTION;  Surgeon: Teodora Dodson DO;  Location: [facility]    COMBINED  SECTION, SALPINGECTOMY BILATERAL Bilateral 10/1/2021    Procedure: REPEAT  SECTION WITH BILATERAL SALPINGECTOMY;  Surgeon: Rhona Lopez MD;  Location: [facility]    GYN SURGERY      c section x 2 in past       MEDICATIONS:  Current Outpatient Medications   Medication Sig Dispense Refill    acetaminophen (TYLENOL) 500 MG tablet Take 1-2 tablets (500-1,000 mg) by mouth every 6 hours as needed for mild pain (Patient not taking: Reported on 2024) 60 tablet 0    ferrous gluconate (FERGON) 324 (38 Fe) MG tablet Take 1 tablet (324 mg) by mouth daily (with breakfast) (Patient not taking: Reported on 2024) 60 tablet 3    ibuprofen (ADVIL/MOTRIN) 600 MG tablet Take 1 tablet (600 mg) by mouth every 6 hours as needed for moderate pain (Patient not taking: Reported on  7/28/2024) 60 tablet 0    Prenatal Vit-Fe Fumarate-FA (PRENATAL VITAMIN) 27-0.8 MG TABS  (Patient not taking: Reported on 7/28/2024)       No current facility-administered medications for this visit.       ALLERGIES:   No Known Allergies    FAMILY HISTORY:  No pertinent family history    SOCIAL HISTORY:  Social History     Tobacco Use    Smoking status: Never    Smokeless tobacco: Never   Vaping Use    Vaping status: Never Used   Substance Use Topics    Alcohol use: No     Alcohol/week: 0.0 standard drinks of alcohol    Drug use: No       The patient's past medical, family, and social history was reviewed and confirmed.    REVIEW OF SYMPTOMS:      General: Negative   Eyes: Negative   Ear, Nose and Throat: Negative   Respiratory: Negative   Cardiovascular: Negative   Gastrointestinal: Negative   Genito-urinary: Negative   Musculoskeletal: see HPI  Neurological: Negative   Psychological: Negative  HEME: Negative   ENDO: Negative   SKIN: Negative    VITALS:  There were no vitals filed for this visit.    EXAM:  General: NAD, A&Ox3  HEENT: NC/AT  CV: RRR by peripheral pulse  Pulmonary: Non-labored breathing on RA  Right upper extremity:  Swelling over the DIP joint of the right ring finger  There is diminished active flexion, no extensor lag, slight malrotation of the finger with flexion  Quite tender to palpation  EDC, FDP, FDS are intact  Sensation is intact to light touch all distributions   warm well-perfused, capillary refill less than 2 seconds       IMAGING:    X-rays of the right hand 7/28/2024 demonstrates a displaced complete articular fracture of the ring finger middle phalangeal head at the DIP joint    I have personally reviewed the above images and labs.         IMPRESSION AND RECOMMENDATIONS:  Ms. Malgorzata Ramachandran is a 29 year old female right hand dominant with right ring finger middle phalanx fracture at the DIP joint    I showed the patient her radiographs and discussed the diagnosis, prognosis,  and treatment options.  We discussed both operative as well as nonoperative treatment.  I did advise that with nonoperative treatment, she likely would have mild residual coronal deformity or malrotation and likely would have diminished active flexion of the DIP joint.  She likely also would develop arthritis degenerative change in the long-term.  If this becomes painful, it can be fused in the future.    Surgery would be in the form of closed versus open reduction internal fixation.  We discussed screw fixation for the articular fragments as well as possible pinning.  I advised that this would provide her the best chance to restore the architecture of the joint and prevent early arthritis.  However she likely would have stiffness of the DIP joint with a possible extensor lag.  It would take her several months of diligent hand therapy to regain full range of motion.    The indications for surgery were discussed with the patient. The benefits, risks, and alternatives of operative management were discussed in detail with the patient. The patient understands that the risks of surgery include, but are not limited to: infection, bleeding, injury to nearby structures (such as nerves, blood vessels, and tendons), nonunion, malunion, hardware failure/irritation or breakage, need for additional surgery, pain, stiffness, scarring, need for rehabilitation, and anesthetic complications.  Patient expressed understanding and elected to proceed with surgery. All questions were answered to the patient's satisfaction.    Case request placed.  [employer] [facility] KACI Huffman MD    Hand, Upper Extremity & Microvascular Surgery  Department of Orthopaedic Surgery  [facility]

## 2024-07-31 ENCOUNTER — PREP FOR PROCEDURE (OUTPATIENT)
Dept: ORTHOPEDICS | Facility: CLINIC | Age: 29
End: 2024-07-31

## 2024-07-31 ENCOUNTER — TELEPHONE (OUTPATIENT)
Dept: ORTHOPEDICS | Facility: CLINIC | Age: 29
End: 2024-07-31

## 2024-07-31 PROBLEM — S62.624A CLOSED DISPLACED FRACTURE OF MIDDLE PHALANX OF RIGHT RING FINGER, INITIAL ENCOUNTER: Status: ACTIVE | Noted: 2024-07-30

## 2024-07-31 NOTE — TELEPHONE ENCOUNTER
Patient is scheduled for surgery with Dr. Toney     Spoke with: Mary Lou    Date of Surgery: 8/1/24    Location: [facility]    Informed patient they will need an adult  Yes    Pre op with Provider Dr. Toney will do DOS, per provider if pt was unable to get in w/PCP.     Additional imaging/appointments: PO Made    Surgery packet: Received     Additional comments: N/A        Davida Bowles on 7/31/2024 at 2:26 PM

## 2024-07-31 NOTE — TELEPHONE ENCOUNTER
Spoke to Mary Lou, advised the Dr. Huffman was unable to do the surgery and that his colleague Dr. Giraldo was going to take the case on and that her surgery scheduler will be in contact with her       She expressed understanding.

## 2024-07-31 NOTE — TELEPHONE ENCOUNTER
Other: Mary Lou called she saw  yesterday 7/30/24 and was told that someone would call her yesterday to get her scheduled for surgery but no one called. Please call patient to schedule     Could we send this information to you in [place] or would you prefer to receive a phone call?:   Patient would prefer a phone call   Okay to leave a detailed message?: Yes at Cell number on file:    Telephone Information:   Mobile [phone]

## 2024-08-01 ENCOUNTER — HOSPITAL ENCOUNTER (OUTPATIENT)
Facility: AMBULATORY SURGERY CENTER | Age: 29
Discharge: HOME | End: 2024-08-01
Attending: STUDENT IN AN ORGANIZED HEALTH CARE EDUCATION/TRAINING PROGRAM | Admitting: STUDENT IN AN ORGANIZED HEALTH CARE EDUCATION/TRAINING PROGRAM

## 2024-08-01 ENCOUNTER — ANCILLARY PROCEDURE (OUTPATIENT)
Dept: RADIOLOGY | Facility: AMBULATORY SURGERY CENTER | Age: 29
End: 2024-08-01
Attending: STUDENT IN AN ORGANIZED HEALTH CARE EDUCATION/TRAINING PROGRAM

## 2024-08-01 ENCOUNTER — ANESTHESIA EVENT (OUTPATIENT)
Dept: SURGERY | Facility: AMBULATORY SURGERY CENTER | Age: 29
End: 2024-08-01

## 2024-08-01 ENCOUNTER — ANESTHESIA (OUTPATIENT)
Dept: SURGERY | Facility: AMBULATORY SURGERY CENTER | Age: 29
End: 2024-08-01

## 2024-08-01 VITALS
OXYGEN SATURATION: 98 % | TEMPERATURE: 97.4 F | HEART RATE: 63 BPM | WEIGHT: 210 LBS | HEIGHT: 62 IN | SYSTOLIC BLOOD PRESSURE: 146 MMHG | BODY MASS INDEX: 38.64 KG/M2 | RESPIRATION RATE: 18 BRPM | DIASTOLIC BLOOD PRESSURE: 74 MMHG

## 2024-08-01 DIAGNOSIS — S62.624A CLOSED DISPLACED FRACTURE OF MIDDLE PHALANX OF RIGHT RING FINGER, INITIAL ENCOUNTER: ICD-10-CM

## 2024-08-01 DIAGNOSIS — S62.624A CLOSED DISPLACED FRACTURE OF MIDDLE PHALANX OF RIGHT RING FINGER, INITIAL ENCOUNTER: Primary | ICD-10-CM

## 2024-08-01 LAB
HCG UR QL: NEGATIVE
INTERNAL QC OK POCT: NORMAL
POCT KIT EXPIRATION DATE: NORMAL
POCT KIT LOT NUMBER: NORMAL

## 2024-08-01 PROCEDURE — 26727 TREAT FINGER FRACTURE EACH: CPT | Mod: F8 | Performed by: STUDENT IN AN ORGANIZED HEALTH CARE EDUCATION/TRAINING PROGRAM

## 2024-08-01 PROCEDURE — 81025 URINE PREGNANCY TEST: CPT | Performed by: PATHOLOGY

## 2024-08-01 PROCEDURE — 26727 TREAT FINGER FRACTURE EACH: CPT | Performed by: ANESTHESIOLOGY

## 2024-08-01 PROCEDURE — 26727 TREAT FINGER FRACTURE EACH: CPT | Performed by: NURSE ANESTHETIST, CERTIFIED REGISTERED

## 2024-08-01 DEVICE — IMP WIRE KIRSCHNER 0.9MM 0.035X4" SGL END TROCAR KM71-131: Type: IMPLANTABLE DEVICE | Site: FINGER | Status: FUNCTIONAL

## 2024-08-01 RX ORDER — SODIUM CHLORIDE, SODIUM LACTATE, POTASSIUM CHLORIDE, CALCIUM CHLORIDE 600; 310; 30; 20 MG/100ML; MG/100ML; MG/100ML; MG/100ML
INJECTION, SOLUTION INTRAVENOUS CONTINUOUS
Status: DISCONTINUED | OUTPATIENT
Start: 2024-08-01 | End: 2024-08-02 | Stop reason: HOSPADM

## 2024-08-01 RX ORDER — OXYCODONE HYDROCHLORIDE 5 MG/1
5 TABLET ORAL EVERY 6 HOURS PRN
Qty: 10 TABLET | Refills: 0 | Status: SHIPPED | OUTPATIENT
Start: 2024-08-01 | End: 2024-08-04

## 2024-08-01 RX ORDER — PROPOFOL 10 MG/ML
INJECTION, EMULSION INTRAVENOUS CONTINUOUS PRN
Status: DISCONTINUED | OUTPATIENT
Start: 2024-08-01 | End: 2024-08-01

## 2024-08-01 RX ORDER — CEFAZOLIN SODIUM 2 G/50ML
2 SOLUTION INTRAVENOUS SEE ADMIN INSTRUCTIONS
Status: DISCONTINUED | OUTPATIENT
Start: 2024-08-01 | End: 2024-08-02 | Stop reason: HOSPADM

## 2024-08-01 RX ORDER — CEFAZOLIN SODIUM 2 G/50ML
2 SOLUTION INTRAVENOUS
Status: COMPLETED | OUTPATIENT
Start: 2024-08-01 | End: 2024-08-01

## 2024-08-01 RX ORDER — PROPOFOL 10 MG/ML
INJECTION, EMULSION INTRAVENOUS PRN
Status: DISCONTINUED | OUTPATIENT
Start: 2024-08-01 | End: 2024-08-01

## 2024-08-01 RX ORDER — ONDANSETRON 2 MG/ML
INJECTION INTRAMUSCULAR; INTRAVENOUS PRN
Status: DISCONTINUED | OUTPATIENT
Start: 2024-08-01 | End: 2024-08-01

## 2024-08-01 RX ORDER — FENTANYL CITRATE 50 UG/ML
INJECTION, SOLUTION INTRAMUSCULAR; INTRAVENOUS PRN
Status: DISCONTINUED | OUTPATIENT
Start: 2024-08-01 | End: 2024-08-01

## 2024-08-01 RX ORDER — LIDOCAINE HYDROCHLORIDE 20 MG/ML
INJECTION, SOLUTION INFILTRATION; PERINEURAL PRN
Status: DISCONTINUED | OUTPATIENT
Start: 2024-08-01 | End: 2024-08-01

## 2024-08-01 RX ADMIN — SODIUM CHLORIDE, SODIUM LACTATE, POTASSIUM CHLORIDE, CALCIUM CHLORIDE: 600; 310; 30; 20 INJECTION, SOLUTION INTRAVENOUS at 10:09

## 2024-08-01 RX ADMIN — FENTANYL CITRATE 50 MCG: 50 INJECTION, SOLUTION INTRAMUSCULAR; INTRAVENOUS at 10:15

## 2024-08-01 RX ADMIN — PROPOFOL 50 MG: 10 INJECTION, EMULSION INTRAVENOUS at 10:15

## 2024-08-01 RX ADMIN — LIDOCAINE HYDROCHLORIDE 40 MG: 20 INJECTION, SOLUTION INFILTRATION; PERINEURAL at 10:15

## 2024-08-01 RX ADMIN — ONDANSETRON 4 MG: 2 INJECTION INTRAMUSCULAR; INTRAVENOUS at 10:20

## 2024-08-01 RX ADMIN — PROPOFOL 150 MCG/KG/MIN: 10 INJECTION, EMULSION INTRAVENOUS at 10:15

## 2024-08-01 RX ADMIN — CEFAZOLIN SODIUM 2 G: 2 SOLUTION INTRAVENOUS at 10:20

## 2024-08-01 NOTE — ANESTHESIA CARE TRANSFER NOTE
Patient: Malgorzata Ramachandran    Procedure: Procedure(s):  CLOSED REDUCTION FRACTURE, RIGHT RING FINGER       Diagnosis: Closed displaced fracture of middle phalanx of right ring finger, initial encounter [W71.876I]  Diagnosis Additional Information: No value filed.    Anesthesia Type:   No value filed.     Note:    Oropharynx: oropharynx clear of all foreign objects  Level of Consciousness: awake  Oxygen Supplementation: room air    Independent Airway: airway patency satisfactory and stable  Dentition: dentition unchanged  Vital Signs Stable: post-procedure vital signs reviewed and stable  Report to RN Given: handoff report given  Patient transferred to: Phase II    Handoff Report: Identifed the Patient, Identified the Reponsible Provider, Reviewed the pertinent medical history, Discussed the surgical course, Reviewed Intra-OP anesthesia mangement and issues during anesthesia, Set expectations for post-procedure period and Allowed opportunity for questions and acknowledgement of understanding      Vitals:  Vitals Value Taken Time   BP     Temp     Pulse     Resp     SpO2         Electronically Signed By: RAMSEY Schroeder CRNA  August 1, 2024  10:54 AM

## 2024-08-01 NOTE — DISCHARGE INSTRUCTIONS
DIAGNOSIS  1. Closed displaced fracture of middle phalanx of right ring finger, initial encounter        MEDICATIONS   Resume all home medications as directed unless otherwise instructed during this hospitalization. If there is any question, double check with your primary care provider.  Start new discharge medications as directed.    Take 1-2 tablets of extra strength Tylenol 500 mg every 6 hours.    For breakthrough pain use narcotic pain medication as prescribed.    Do not drive or operate machinery while taking narcotic pain medications.   If you are taking other Tylenol containing medicines at home, be sure NOT to exceed 4 gram's (4000 milligrams) of Tylenol per day.   If you are taking pain medications, be sure to take Colace (docusate sodium) as well to prevent constipation. If constipated, try adding another cathartic or enema.  If nausea and vomiting, call the hospital or seek medical attention.    ACTIVITY   Weight bearin lb coffee cup weight bearing to operative extremity    DIET  Resume same diet prior to your hospital admission.    WOUND   Leave dressing on until you are seen in clinic for your follow up visit.   Watch for signs and symptoms of infection of your wounds including; pain, redness, swelling, drainage or fever.  If you notice any of these symptoms please call or seek medical attention.    Keep wound clean, dry, and intact.  Do not submerge wounds in water until they are healed. No baths, soaking, swimming, or prolonged water exposure for 4 weeks after surgery.    RETURN   Follow-up with Orthopedic Clinic as directed.     Future Appointments   Date Time Provider Department Center   2024  2:30 PM Ernestine Toney MD [facility]   2024  3:30 PM Anat Mooney OT [facility] [place] [facility] [place]       Call the [facility] at [phone] during business hours for any symptoms such as:    * Fevers with Temperature greater than 101.5 degrees.   * Pus drainage from wound site.   *  "Severe pain, not controlled by medication.   * Persistent nausea, vomiting and inablility to tolerate fluids.    If you are receiving care in [place], you may call the Orthopedic clinic at [phone] Option #2.    FOR URGENT PROBLEMS ONLY, after hours or on weekends call the hospital  at [phone] and ask to speak with the orthopedic resident on call.      [facility]  Home Care Following Anesthesia  For 24 hours after surgery:  Get plenty of rest.  A responsible adult must stay with you for at least 24 hours after you leave the surgery center.  Do not drive or use heavy equipment.  If you have weakness or tingling, don't drive or use heavy equipment until this feeling goes away.   Do not drink alcohol.   Avoid strenuous or risky activities.  Ask for help when climbing stairs.  You may feel lightheaded.  IF so, sit for a few minutes before standing.  Have someone help you get up.   If you have nausea (feel sick to your stomach): Drink only clear liquids such as apple juice, ginger ale, broth or 7-Up.  Rest may also help.  Be sure to drink enough fluids.  Move to a regular diet as you feel able.   You may have a slight fever.  Call the doctor if your fever is over 100 F (37.7 C) (taken under the tongue) or lasts longer than 24 hours.  You may have a dry mouth, a sore throat, muscle aches or trouble sleeping. These should go away after 24 hours.  Do not make important or legal decisions.   It is recommended to avoid smoking.        Today you received a Marcaine or bupivacaine block to numb the nerves near your surgery site.  This is a block using local anesthetic or \"numbing\" medication injected around the nerves to anesthetize or \"numb\" the area supplied by those nerves.  This block is injected into the muscle layer near your surgical site.  The medication may numb the location where you had surgery for 6-18 hours, but may last up to 24 hours.  If your surgical " site is an arm or leg you should be careful with your affected limb, since it is possible to injure your limb without being aware of it due to the numbing.  Until full feeling returns, you should guard against bumping or hitting your limb, and avoid extreme hot or cold temperatures on the skin.  As the block wears off, the feeling will return as a tingling or prickly sensation near your surgical site.  You will experience more discomfort from your incision as the feeling returns.  You may want to take a pain pill (a narcotic or Tylenol if this was prescribed by your surgeon) when you start to experience mild pain before the pain beccomes more severe.  If your pain medications do not control your pain you should notifiy your surgeon.    Tips for taking pain medications  To get the best pain relief possible, remember these points:  Take pain medications as directed, before pain becomes severe.  Pain medication can upset your stomach: taking it with food may help.  Constipation is a common side effect of pain medication. Drink plenty of  fluids.  Eat foods high in fiber. Take a stool softener if recommended by your doctor or pharmacist.  Do not drink alcohol, drive or operate machinery while taking pain medications.  Ask about other ways to control pain, such as with heat, ice or relaxation.    Tylenol/Acetaminophen Consumption    If you feel your pain relief is insufficient, you may take Tylenol/Acetaminophen in addition to your narcotic pain medication.   Be careful not to exceed 4,000 mg of Tylenol/Acetaminophen in a 24 hour period from all sources.  If you are taking extra strength Tylenol/acetaminophen (500 mg), the maximum dose is 8 tablets in 24 hours.  If you are taking regular strength acetaminophen (325 mg), the maximum dose is 12 tablets in 24 hours.    Call a doctor for any of the following:  Signs of infection (fever, growing tenderness at the surgery site, a large amount of drainage or bleeding, severe  pain, foul-smelling drainage, redness, swelling).  It has been over 8 to 10 hours since surgery and you are still not able to urinate (pass water).  Headache for over 24 hours.  Numbness, tingling or weakness the day after surgery (if you had spinal anesthesia).  Signs of Covid-19 infection (temperature over 100 degrees, shortness of breath, cough, loss of taste/smell, generalized body aches, persistent headache, chills, sore throat, nausea/vomiting/diarrhea)  Your doctor is:       Dr. Ernestine Toney, Orthopaedics: [phone]               Or dial [phone] and ask for the resident on call for:  Orthopaedics  For emergency care, call the:  [facility] Emergency Department: [phone] (TTY for hearing impaired: [phone])

## 2024-08-01 NOTE — OP NOTE
"OPERATIVE REPORT    PATIENT NAME: Malgorzata Ramachandran  MRN: [id number]    DATE: 8/1/2024    PREOPERATIVE DIAGNOSIS:   1. Closed displaced fracture of the middle phalanx of the right ring finger.    POSTOPERATIVE DIAGNOSIS:   1. Closed displaced fracture of the middle phalanx of the right ring finger.    OPERATION:   1. Right ring finger middle phalanx closed reduction percutaneous pinning.     SURGEON: Ernestine Toney MD     STAFF: Circulator: Yary Marin RN  Scrub Person: Graciela Stephens MA    ANESTHESIA:  Local and IV sedation    IMPLANT(S):   Implant Name Type Inv. Item Serial No.  Lot No. LRB No. Used Action   IMP WIRE CHAY 0.9MM 0.035X4\" [facility] END TROCAR [id number] - [id number]  IMP WIRE CHAY 0.9MM 0.035X4\" [employer] END TROCAR [id number]  [employer]  Right 3 Implanted       SPECIMEN: * No specimens in log *    ESTIMATED BLOOD LOSS: < 5 mL     FLUIDS: See anesthesia records.     URINE OUTPUT: Not applicable.  Jones was not placed.     TOURNIQUET TIME: Not applicable.    COMPLICATIONS: None.     INDICATIONS: Malgorzata Ramachandran is a 29 year old female who sustained a closed displaced fracture of the middle phalanx of the right ring finger with rotational and angular malalignment.  After a thorough evaluation and discussion of treatment options, the patient was indicated for operative intervention. The risks, benefits, and alternatives were discussed with the patient.  The patient verbalized understanding of the treatment plan and an informed consent was signed.    DESCRIPTION OF PROCEDURE: The patient was taken to the operating room and placed in supine position on the operating table. Anesthesia was administered by the Department of Anesthesia followed by administration of antibiotics. The base of the ring finger was prepped with alcohol. A digital block was performed using 1% lidocaine and 0.25% bupivacaine. The right arm was prepped and draped in the usual sterile fashion.  A " timeout was performed with all OR staff.  The patient name, MRN, operative extremity, procedure, allergies, antibiotics, DVT prophylaxis/SCDs, and fire precautions/plan were reviewed, and all were in agreement.    A closed reduction of the phalanx was performed.  An acceptable reduction, with regard to length, alignment, and rotation, was obtained and confirmed with fluoroscopy.  The fracture was then stabilized with two oblique and one transverse 0.035 K wire.  Fluoroscopy confirmed fracture reduction and alignment on AP, lateral, and oblique views. The pins were cut and buried beneath the skin. Clinical exam showed no rotational deformity with a normal cascade.  Sterile dressings and a splint were applied.  Capillary refill was intact < 2 seconds.      The patient was aroused from anesthesia and taken to the recovery room in stable condition.      All counts were correct at the end of the case.       There were no complications.    POSTOPERATIVE PLAN: return to clinic in 1-2 weeks for wound check, radiographs. Splint to hold DIP extension until wires come out at around 4 weeks.    PEPPER NIXON MD

## 2024-08-01 NOTE — ANESTHESIA POSTPROCEDURE EVALUATION
Patient: Malgorzata Ramachandran    Procedure: Procedure(s):  CLOSED REDUCTION FRACTURE, RIGHT RING FINGER       Anesthesia Type:  No value filed.    Note:  Disposition: Outpatient   Postop Pain Control: Uneventful            Sign Out: Well controlled pain   PONV: No   Neuro/Psych: Uneventful            Sign Out: Acceptable/Baseline neuro status   Airway/Respiratory: Uneventful            Sign Out: Acceptable/Baseline resp. status   CV/Hemodynamics: Uneventful            Sign Out: Acceptable CV status   Other NRE: NONE   DID A NON-ROUTINE EVENT OCCUR? No           Last vitals:  Vitals Value Taken Time   /74 08/01/24 1145   Temp 36.3  C (97.4  F) 08/01/24 1145   Pulse 63 08/01/24 1145   Resp 18 08/01/24 1145   SpO2 98 % 08/01/24 1145       Electronically Signed By: Олег Mccarthy MD  August 1, 2024  4:19 PM

## 2024-08-01 NOTE — ANESTHESIA PREPROCEDURE EVALUATION
Anesthesia Pre-Procedure Evaluation    Patient: Malgorzata Ramachandran   MRN: [id number] : 1995        Procedure : Procedure(s):  CLOSED REDUCTION FRACTURE, RIGHT RING FINGER          Past Medical History:   Diagnosis Date    Abnormal Pap smear     PONV (postoperative nausea and vomiting)       Past Surgical History:   Procedure Laterality Date    ANESTH,LOWER ARM SKIN SURG      BIOPSY OF SKIN LESION       SECTION  2014     SECTION N/A 2017    Procedure:  SECTION;  Surgeon: Teodora Dodson DO;  Location: [facility]    COMBINED  SECTION, SALPINGECTOMY BILATERAL Bilateral 10/1/2021    Procedure: REPEAT  SECTION WITH BILATERAL SALPINGECTOMY;  Surgeon: Rhona Lopez MD;  Location: [facility]    GYN SURGERY      c section x 2 in past      No Known Allergies   Social History     Tobacco Use    Smoking status: Never    Smokeless tobacco: Never   Substance Use Topics    Alcohol use: No     Alcohol/week: 0.0 standard drinks of alcohol      Wt Readings from Last 1 Encounters:   24 95.3 kg (210 lb)        Anesthesia Evaluation   Pt has had prior anesthetic.     History of anesthetic complications  - PONV.      ROS/MED HX  ENT/Pulmonary:  - neg pulmonary ROS     Neurologic:  - neg neurologic ROS     Cardiovascular:  - neg cardiovascular ROS     METS/Exercise Tolerance:     Hematologic:  - neg hematologic  ROS     Musculoskeletal:  - neg musculoskeletal ROS     GI/Hepatic:  - neg GI/hepatic ROS     Renal/Genitourinary:  - neg Renal ROS     Endo:  - neg endo ROS     Psychiatric/Substance Use:  - neg psychiatric ROS     Infectious Disease:  - neg infectious disease ROS     Malignancy:  - neg malignancy ROS     Other:  - neg other ROS          Physical Exam    Airway  airway exam normal      Mallampati: II   TM distance: > 3 FB   Neck ROM: full   Mouth opening: > 3 cm    Respiratory Devices and Support         Dental       (+) Completely normal  "teeth      Cardiovascular          Rhythm and rate: regular and normal     Pulmonary   pulmonary exam normal        breath sounds clear to auscultation           OUTSIDE LABS:  CBC:   Lab Results   Component Value Date    WBC 7.8 03/23/2024    WBC 12.9 (H) 01/10/2023    HGB 11.7 03/23/2024    HGB 11.2 (L) 01/10/2023    HCT 36.2 03/23/2024    HCT 35.3 01/10/2023     03/23/2024     01/10/2023     BMP:   Lab Results   Component Value Date     03/23/2024     01/10/2023    POTASSIUM 3.6 03/23/2024    POTASSIUM 3.9 01/10/2023    CHLORIDE 104 03/23/2024    CHLORIDE 105 01/10/2023    CO2 26 03/23/2024    CO2 24 01/10/2023    BUN 8.9 03/23/2024    BUN 14.2 01/10/2023    CR 0.63 03/23/2024    CR 0.65 01/10/2023    GLC 93 03/23/2024     (H) 01/10/2023     COAGS: No results found for: \"PTT\", \"INR\", \"FIBR\"  POC:   Lab Results   Component Value Date    HCG Negative 08/01/2024    HCGS Negative 03/23/2024     HEPATIC:   Lab Results   Component Value Date    ALBUMIN 4.4 03/23/2024    PROTTOTAL 7.7 03/23/2024    ALT 13 03/23/2024    AST 13 03/23/2024    ALKPHOS 85 03/23/2024    BILITOTAL <0.2 03/23/2024     OTHER:   Lab Results   Component Value Date    LACT 0.6 09/29/2015    JOSESITO 9.0 03/23/2024    MAG 1.7 06/25/2021    LIPASE 69 (L) 03/31/2021    TSH 2.23 06/25/2021       Anesthesia Plan    ASA Status:  1    NPO Status:  NPO Appropriate    Anesthesia Type: MAC.     - Reason for MAC: immobility needed, straight local not clinically adequate   Induction: Intravenous.   Maintenance: TIVA.        Consents    Anesthesia Plan(s) and associated risks, benefits, and realistic alternatives discussed. Questions answered and patient/representative(s) expressed understanding.     - Discussed:     - Discussed with:  Patient      - Extended Intubation/Ventilatory Support Discussed: No.      - Patient is DNR/DNI Status: No     Use of blood products discussed: No .     Postoperative Care    Pain management: IV " "analgesics, Oral pain medications, Multi-modal analgesia.   PONV prophylaxis: Ondansetron (or other 5HT-3), Background Propofol Infusion     Comments:               Олег Mccarthy MD    I have reviewed the pertinent notes and labs in the chart from the past 30 days and (re)examined the patient.  Any updates or changes from those notes are reflected in this note.              # Obesity: Estimated body mass index is 38.41 kg/m  as calculated from the following:    Height as of this encounter: 1.575 m (5' 2\").    Weight as of this encounter: 95.3 kg (210 lb).      "

## 2024-08-05 DIAGNOSIS — S62.624A CLOSED DISPLACED FRACTURE OF MIDDLE PHALANX OF RIGHT RING FINGER, INITIAL ENCOUNTER: Primary | ICD-10-CM

## 2024-08-05 DIAGNOSIS — Z98.890 POST-OPERATIVE STATE: ICD-10-CM

## 2024-08-05 NOTE — PROGRESS NOTES
Orthopaedic Surgery Hand and Upper Extremity Clinic Note:  Date: Aug 6, 2024     Patient Name: Malgorzata Ramachandran  MRN: [id number]    CHIEF COMPLAINT:  Chief Complaint   Patient presents with    Surgical Followup     Post op follow-up Right ring finger middle phalanx closed reduction percutaneous pinning. DOS: 8/1/24     Dominant Hand: right  Occupation: EMT  Diagnosis: right middle finger P2 fracture  Surgery: 8/1/2024: right ring finger P2 closed reduction percutaneous pin fixation    HPI:  Ms. Malgorzata Ramachandran is a 29 year old female right hand dominant who presents with right ring finger distal phalanx fracture. Injury occurred on 7/28/24 when her child landed on her finger while patient was attempting to stop her from falling. She was seen at  on day of injury at which time XR were taken and she was placed in a splint. She is taking Tylenol, icing, and wearing an alumafoam splint.     8/6/2024: presents for first postoperative visit. Reports pain controlled, she was using her splint.    VITALS:  There were no vitals filed for this visit.    EXAM:  General: NAD, A&Ox3  CV: RRR by peripheral pulse  Pulmonary: Non-labored breathing on RA    Right upper extremity:  Pin sites healing well  TTP over DIP/P2 distal  EDC, FDS are intact  Sensation is intact to light touch all distributions   warm well-perfused, capillary refill less than 2 seconds       IMAGING:  X-rays of the right hand 8/6/2024 demonstrates a complete articular fracture of the ring finger middle phalangeal head at the DIP joint, now treated with K wire fixation with improved alignment    X-rays of the right hand 7/28/2024 demonstrates a displaced complete articular fracture of the ring finger middle phalangeal head at the DIP joint      IMPRESSION AND RECOMMENDATIONS:  Ms. Malgorzata Ramachandran is a 29 year old female right hand dominant with right ring finger middle phalanx fracture at the DIP joint, now s/p CRPP    I had a discussion  with the patient regarding my clinical findings, diagnosis, and treatment plan. We reviewed the post-operative plan, frequency of follow-up, rehabilitation, and expected outcomes.  All questions answered.     NWB right upper extremity. Avoid use of ring finger   OK to wash hands/shower.  Do not submerge incision until 4 weeks from the date of surgery.   Hand therapy for DIP extension splint. OK for PIP ROM with DIP splinted    Next Visit:   Follow-up: 3 week(s).   Imaging: XR right ring finger .   Plan: review imaging, possible pin removal    PEPPER NIXON MD

## 2024-08-06 ENCOUNTER — OFFICE VISIT (OUTPATIENT)
Dept: ORTHOPEDICS | Facility: CLINIC | Age: 29
End: 2024-08-06

## 2024-08-06 ENCOUNTER — ANCILLARY PROCEDURE (OUTPATIENT)
Dept: GENERAL RADIOLOGY | Facility: CLINIC | Age: 29
End: 2024-08-06
Attending: STUDENT IN AN ORGANIZED HEALTH CARE EDUCATION/TRAINING PROGRAM

## 2024-08-06 ENCOUNTER — THERAPY VISIT (OUTPATIENT)
Dept: OCCUPATIONAL THERAPY | Facility: CLINIC | Age: 29
End: 2024-08-06

## 2024-08-06 DIAGNOSIS — S62.624A CLOSED DISPLACED FRACTURE OF MIDDLE PHALANX OF RIGHT RING FINGER, INITIAL ENCOUNTER: ICD-10-CM

## 2024-08-06 DIAGNOSIS — S62.624A CLOSED DISPLACED FRACTURE OF MIDDLE PHALANX OF RIGHT RING FINGER, INITIAL ENCOUNTER: Primary | ICD-10-CM

## 2024-08-06 DIAGNOSIS — Z98.890 POST-OPERATIVE STATE: ICD-10-CM

## 2024-08-06 DIAGNOSIS — M79.644 PAIN OF FINGER OF RIGHT HAND: Primary | ICD-10-CM

## 2024-08-06 PROCEDURE — 99024 POSTOP FOLLOW-UP VISIT: CPT | Performed by: STUDENT IN AN ORGANIZED HEALTH CARE EDUCATION/TRAINING PROGRAM

## 2024-08-06 PROCEDURE — 73140 X-RAY EXAM OF FINGER(S): CPT | Mod: RT | Performed by: RADIOLOGY

## 2024-08-06 PROCEDURE — 97760 ORTHOTIC MGMT&TRAING 1ST ENC: CPT | Mod: GO

## 2024-08-06 NOTE — LETTER
[facility] [address] [address]  [phone]          August 6, 2024    RE:  Malgorzata Ramachandran                                                                                                                                                       [address] [address] [place] [address]            To whom it may concern:    Malgorzata Ramachandran is under my professional care for Post op Right Ring finger DOS: 8/1/24 She  may return to work with the following: The employee is ABLE to return to work at light duty.    When the patient returns to work, the following restrictions apply until 9/3/21:  No use of Right Hand for one month.        Sincerely,        Ernestine Toney MD

## 2024-08-06 NOTE — LETTER
8/6/2024      Malgorzata Ramachandran  [address] [address] [place] [address]      Dear Colleague,    Thank you for referring your patient, Malgorzata Ramachandran, to the [facility] [place]. Please see a copy of my visit note below.    Orthopaedic Surgery Hand and Upper Extremity Clinic Note:  Date: Aug 6, 2024     Patient Name: Malgorzata Ramachandran  MRN: [id number]    CHIEF COMPLAINT:  Chief Complaint   Patient presents with     Surgical Followup     Post op follow-up Right ring finger middle phalanx closed reduction percutaneous pinning. DOS: 8/1/24     Dominant Hand: right  Occupation: EMT  Diagnosis: right middle finger P2 fracture  Surgery: 8/1/2024: right ring finger P2 closed reduction percutaneous pin fixation    HPI:  Ms. Malgorzata Ramachandran is a 29 year old female right hand dominant who presents with right ring finger distal phalanx fracture. Injury occurred on 7/28/24 when her child landed on her finger while patient was attempting to stop her from falling. She was seen at  on day of injury at which time XR were taken and she was placed in a splint. She is taking Tylenol, icing, and wearing an alumafoam splint.     8/6/2024: presents for first postoperative visit. Reports pain controlled, she was using her splint.    VITALS:  There were no vitals filed for this visit.    EXAM:  General: NAD, A&Ox3  CV: RRR by peripheral pulse  Pulmonary: Non-labored breathing on RA    Right upper extremity:  Pin sites healing well  TTP over DIP/P2 distal  EDC, FDS are intact  Sensation is intact to light touch all distributions   warm well-perfused, capillary refill less than 2 seconds       IMAGING:  X-rays of the right hand 8/6/2024 demonstrates a complete articular fracture of the ring finger middle phalangeal head at the DIP joint, now treated with K wire fixation with improved alignment    X-rays of the right hand 7/28/2024 demonstrates a displaced complete articular fracture of the  ring finger middle phalangeal head at the DIP joint      IMPRESSION AND RECOMMENDATIONS:  Ms. Malgorzata Ramachandran is a 29 year old female right hand dominant with right ring finger middle phalanx fracture at the DIP joint, now s/p CRPP    I had a discussion with the patient regarding my clinical findings, diagnosis, and treatment plan. We reviewed the post-operative plan, frequency of follow-up, rehabilitation, and expected outcomes.  All questions answered.      NWB right upper extremity. Avoid use of ring finger    OK to wash hands/shower.  Do not submerge incision until 4 weeks from the date of surgery.    Hand therapy for DIP extension splint. OK for PIP ROM with DIP splinted    Next Visit:    Follow-up: 3 week(s).    Imaging: XR right ring finger .    Plan: review imaging, possible pin removal    PEPPER NIXON MD        Again, thank you for allowing me to participate in the care of your patient.        Sincerely,        PEPPER NIXON MD

## 2024-08-06 NOTE — NURSING NOTE
Reason For Visit:   Chief Complaint   Patient presents with    Surgical Followup     Post op follow-up Right ring finger middle phalanx closed reduction percutaneous pinning. DOS: 8/1/24       Primary MD: Isaías Correia [facility]  Ref. MD: Est    Age: 29 year old    ?  No      There were no vitals taken for this visit.      Pain Assessment  Patient Currently in Pain: Yes  0-10 Pain Scale: 6  Primary Pain Location: Finger (Comment which one) (Right ring finger)  Pain Descriptors: Numbness, Throbbing    Hand Dominance Evaluation  Hand Dominance: Right          QuickDASH Assessment       No data to display                   Current Outpatient Medications   Medication Sig Dispense Refill    acetaminophen (TYLENOL) 500 MG tablet Take 1-2 tablets (500-1,000 mg) by mouth every 6 hours as needed for mild pain (Patient not taking: Reported on 7/28/2024) 60 tablet 0    ferrous gluconate (FERGON) 324 (38 Fe) MG tablet Take 1 tablet (324 mg) by mouth daily (with breakfast) (Patient not taking: Reported on 7/28/2024) 60 tablet 3    ibuprofen (ADVIL/MOTRIN) 600 MG tablet Take 1 tablet (600 mg) by mouth every 6 hours as needed for moderate pain (Patient not taking: Reported on 7/28/2024) 60 tablet 0    Prenatal Vit-Fe Fumarate-FA (PRENATAL VITAMIN) 27-0.8 MG TABS  (Patient not taking: Reported on 7/28/2024)         No Known Allergies    Aure Mao, ATC

## 2024-08-06 NOTE — PROGRESS NOTES
OCCUPATIONAL THERAPY EVALUATION  Type of Visit: Evaluation              Subjective      Presenting condition or subjective complaint:   R RF  middle phalanx fx s/p pinning  Date of onset: 24    Relevant medical history:     Past Medical History:   Diagnosis Date    Abnormal Pap smear     PONV (postoperative nausea and vomiting)        Dates & types of surgery:    Past Surgical History:   Procedure Laterality Date    ANESTH,LOWER ARM SKIN SURG      BIOPSY OF SKIN LESION       SECTION  2014     SECTION N/A 2017    Procedure:  SECTION;  Surgeon: Teodora Dodson DO;  Location: [facility]    COMBINED  SECTION, SALPINGECTOMY BILATERAL Bilateral 10/1/2021    Procedure: REPEAT  SECTION WITH BILATERAL SALPINGECTOMY;  Surgeon: Rhona Lopez MD;  Location: [facility]    GYN SURGERY      c section x 2 in past    OPEN REDUCTION INTERNAL FIXATION FINGER(S) Right 2024    Procedure: CLOSED REDUCTION FRACTURE, RIGHT RING FINGER;  Surgeon: Ernestine Toney MD;  Location: [facility] OR         Prior diagnostic imaging/testing results:     x-ray  Prior therapy history for the same diagnosis, illness or injury:    no    Prior Level of Function  Indep in all areas    Living Environment      Employment:     [employer] - on light duty currently      Patient goals for therapy:      Pain assessment: Pain present   3-4/10   Objective     Pain: 3-4/10       Edema (Circumference measured in cm)   2024    L R   RF DIP 5.1 6.1       Sensation   WNL throughout all nerve distributions; per patient report   Some sensitivity after removal of splint.     ROM of Fingers:  Contraindicated for DIP, pt able to flex MP and PIP joints w/ min discomfort.     Orthosis fabricated for right ring finger DIP joint, to be worn at all times except hygiene.    Strength  Testing deferred      Assessment & Plan   CLINICAL IMPRESSIONS  Medical Diagnosis: R RF middle phalanx fx s/p pinning    Treatment  Diagnosis: R RF pain    Impression/Assessment: Pt is a 29 year old female presenting to Occupational Therapy due to R RF middle phalanx fx.  The following significant findings have been identified: Impaired activity tolerance, Impaired ROM, Impaired strength, Pain, and Post-surgical precautions.  These identified deficits interfere with their ability to perform self care tasks, work tasks, recreational activities, household chores, driving , and meal planning and preparation as compared to previous level of function.   Patient's limitations or Problem List includes: Pain, Decreased ROM/motion, Increased edema, and Weakness of the right hand and ring finger which interferes with the patient's ability to perform Self Care Tasks ( ), Work Tasks, Recreational Activities, Household Chores, and Driving  as compared to previous level of function.    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     Long Term Goals   OT Goal 1  Goal Description: Pt will wear splint at all times except for hygiene for protection of healing tissue during daily occupations.  Target Date: 08/13/24      Frequency of Treatment: 1x for splinting  Duration of Treatment: 1 week     Recommended Referrals to Other Professionals:  None  Education Assessment: Learner/Method: Patient  Education Comments: No barriers to learning     Risks and benefits of evaluation/treatment have been explained.   Patient/Family/caregiver agrees with Plan of Care.     Evaluation Time:      Signing Clinician: SIRI Shabazz          [facility]                                                                                    OUTPATIENT OCCUPATIONAL THERAPY    PLAN OF TREATMENT FOR OUTPATIENT REHABILITATION   Patient's Last Name, First Name, Malgorzata Laurent YOB: 1995   Provider's Name   [facility]   Medical Record No.  [id number]     Onset Date: 08/01/24 Start of Care Date: 08/06/24     Medical Diagnosis:  R RF middle phalanx fx s/p pinning      OT Treatment Diagnosis:  R RF pain Plan of Treatment  Frequency/Duration:1x for splinting/1 week    Certification date from 08/06/24   To 08/13/24        See note for plan of treatment details and functional goals     SIRI Shabazz                         I CERTIFY THE NEED FOR THESE SERVICES FURNISHED UNDER        THIS PLAN OF TREATMENT AND WHILE UNDER MY CARE     (Physician attestation of this document indicates review and certification of the therapy plan).              Referring Provider:  Ernestine Toney    Initial Assessment  See Epic Evaluation- 08/06/24

## 2024-08-15 ENCOUNTER — DOCUMENTATION ONLY (OUTPATIENT)
Dept: ORTHOPEDICS | Facility: CLINIC | Age: 29
End: 2024-08-15

## 2024-08-15 NOTE — PROGRESS NOTES
Received Completed forms Yes   Faxed Forms Faxed To: [facility]  Fax Number: [phone]   Sent to HIM (Date) 8/15/24

## 2024-08-19 DIAGNOSIS — Z98.890 POST-OPERATIVE STATE: ICD-10-CM

## 2024-08-19 DIAGNOSIS — S62.624A CLOSED DISPLACED FRACTURE OF MIDDLE PHALANX OF RIGHT RING FINGER, INITIAL ENCOUNTER: Primary | ICD-10-CM

## 2024-08-20 NOTE — PROGRESS NOTES
Orthopaedic Surgery Hand and Upper Extremity Clinic Note:  Date: Aug 28, 2024     Patient Name: Malgorzata Ramachandran  MRN: [id number]    CHIEF COMPLAINT:  Chief Complaint   Patient presents with    Surgical Followup     Post op Right ring finger middle phalanx closed reduction percutaneous pinning. DOS: 8/1/24     Dominant Hand: right  Occupation: EMT  Diagnosis: right middle finger P2 fracture  Surgery: 8/1/2024: right ring finger P2 closed reduction percutaneous pin fixation    HPI:  Ms. Malgorzata Ramachandran is a 29 year old female right hand dominant who presents with right ring finger distal phalanx fracture. Injury occurred on 7/28/24 when her child landed on her finger while patient was attempting to stop her from falling. She was seen at  on day of injury at which time XR were taken and she was placed in a splint. She is taking Tylenol, icing, and wearing an alumafoam splint.     8/6/2024: presents for first postoperative visit. Reports pain controlled, she was using her splint.    8/28/2024: Reports minimal to no pain in the finger    VITALS:  There were no vitals filed for this visit.    EXAM:  General: NAD, A&Ox3  CV: RRR by peripheral pulse  Pulmonary: Non-labored breathing on RA    Right upper extremity:  Pin sites healed  No TTP over DIP/P2 distal  EDC, FDS are intact  Sensation is intact to light touch all distributions   warm well-perfused, capillary refill less than 2 seconds       IMAGING:  X-rays of the right hand 8/28/2024 demonstrates a complete articular fracture of the ring finger middle phalangeal head at the DIP joint, with K wire fixation with good alignment, signs of healing     X-rays of the right hand 8/6/2024 demonstrates a complete articular fracture of the ring finger middle phalangeal head at the DIP joint, now treated with K wire fixation with improved alignment    X-rays of the right hand 7/28/2024 demonstrates a displaced complete articular fracture of the ring finger middle  phalangeal head at the DIP joint      IMPRESSION AND RECOMMENDATIONS:  Ms. Malgorzata Ramachandran is a 29 year old female right hand dominant with right ring finger middle phalanx fracture at the DIP joint, now s/p CRPP    I had a discussion with the patient regarding my clinical findings, diagnosis, and treatment plan. We reviewed the post-operative plan, frequency of follow-up, rehabilitation, and expected outcomes.  Plan for pin removal today. All questions answered.    The base of the ring finger was prepped with alcohol. 10ml lidocaine 1% was used as a digital block. The pin sites were prepped with chloraprep, a puncture wound was made over the pins, and the pins were removed using using a needle .     Hand therapy for finger ROM. Splint for strenuous activities    Next Visit:   Follow-up: 4 week(s).   Imaging: XR right ring finger .   Plan: review imaging, advance activities    PEPPER NIXON MD

## 2024-08-28 ENCOUNTER — ANCILLARY PROCEDURE (OUTPATIENT)
Dept: GENERAL RADIOLOGY | Facility: CLINIC | Age: 29
End: 2024-08-28
Attending: STUDENT IN AN ORGANIZED HEALTH CARE EDUCATION/TRAINING PROGRAM

## 2024-08-28 ENCOUNTER — OFFICE VISIT (OUTPATIENT)
Dept: ORTHOPEDICS | Facility: CLINIC | Age: 29
End: 2024-08-28

## 2024-08-28 DIAGNOSIS — S62.624A CLOSED DISPLACED FRACTURE OF MIDDLE PHALANX OF RIGHT RING FINGER, INITIAL ENCOUNTER: ICD-10-CM

## 2024-08-28 DIAGNOSIS — Z98.890 POST-OPERATIVE STATE: ICD-10-CM

## 2024-08-28 DIAGNOSIS — S62.624A CLOSED DISPLACED FRACTURE OF MIDDLE PHALANX OF RIGHT RING FINGER, INITIAL ENCOUNTER: Primary | ICD-10-CM

## 2024-08-28 PROCEDURE — 99024 POSTOP FOLLOW-UP VISIT: CPT | Performed by: STUDENT IN AN ORGANIZED HEALTH CARE EDUCATION/TRAINING PROGRAM

## 2024-08-28 PROCEDURE — 73140 X-RAY EXAM OF FINGER(S): CPT | Mod: RT | Performed by: RADIOLOGY

## 2024-08-28 RX ORDER — LIDOCAINE HYDROCHLORIDE 10 MG/ML
10 INJECTION, SOLUTION EPIDURAL; INFILTRATION; INTRACAUDAL; PERINEURAL
Status: SHIPPED | OUTPATIENT
Start: 2024-08-28

## 2024-08-28 RX ADMIN — LIDOCAINE HYDROCHLORIDE 10 ML: 10 INJECTION, SOLUTION EPIDURAL; INFILTRATION; INTRACAUDAL; PERINEURAL at 13:45

## 2024-08-28 NOTE — LETTER
[facility] [address]  [address] [facility] [address]  [phone]          August 28, 2024    RE:  Malgorzata Ramachandran                                                                                                                                                       [address] [facility] [address]            To whom it may concern:    Malgorzata Ramachandran is under my professional care for Closed displaced fracture of middle phalanx of right ring finger, initial encounter She  may return to work with the following: The employee is ABLE to return to work on 9/2/24 with no restrictions.        Sincerely,        Ernestine Toney MD

## 2024-08-28 NOTE — LETTER
8/28/2024      Malgorzata Ramachandran  [address] [facility] [address]      Dear Colleague,    Thank you for referring your patient, Malgorzata Ramachandran, to the [facility] [place]. Please see a copy of my visit note below.    Orthopaedic Surgery Hand and Upper Extremity Clinic Note:  Date: Aug 28, 2024     Patient Name: Malgorzata Ramachandran  MRN: [id number]    CHIEF COMPLAINT:  Chief Complaint   Patient presents with     Surgical Followup     Post op Right ring finger middle phalanx closed reduction percutaneous pinning. DOS: 8/1/24     Dominant Hand: right  Occupation: EMT  Diagnosis: right middle finger P2 fracture  Surgery: 8/1/2024: right ring finger P2 closed reduction percutaneous pin fixation    HPI:  Ms. Malgorzata Ramachandran is a 29 year old female right hand dominant who presents with right ring finger distal phalanx fracture. Injury occurred on 7/28/24 when her child landed on her finger while patient was attempting to stop her from falling. She was seen at  on day of injury at which time XR were taken and she was placed in a splint. She is taking Tylenol, icing, and wearing an alumafoam splint.     8/6/2024: presents for first postoperative visit. Reports pain controlled, she was using her splint.    8/28/2024: Reports minimal to no pain in the finger    VITALS:  There were no vitals filed for this visit.    EXAM:  General: NAD, A&Ox3  CV: RRR by peripheral pulse  Pulmonary: Non-labored breathing on RA    Right upper extremity:  Pin sites healed  No TTP over DIP/P2 distal  EDC, FDS are intact  Sensation is intact to light touch all distributions   warm well-perfused, capillary refill less than 2 seconds       IMAGING:  X-rays of the right hand 8/28/2024 demonstrates a complete articular fracture of the ring finger middle phalangeal head at the DIP joint, with K wire fixation with good alignment, signs of healing     X-rays of the right hand 8/6/2024 demonstrates  a complete articular fracture of the ring finger middle phalangeal head at the DIP joint, now treated with K wire fixation with improved alignment    X-rays of the right hand 7/28/2024 demonstrates a displaced complete articular fracture of the ring finger middle phalangeal head at the DIP joint      IMPRESSION AND RECOMMENDATIONS:  Ms. Malgorzata Ramachandran is a 29 year old female right hand dominant with right ring finger middle phalanx fracture at the DIP joint, now s/p CRPP    I had a discussion with the patient regarding my clinical findings, diagnosis, and treatment plan. We reviewed the post-operative plan, frequency of follow-up, rehabilitation, and expected outcomes.  Plan for pin removal today. All questions answered.    The base of the ring finger was prepped with alcohol. 10ml lidocaine 1% was used as a digital block. The pin sites were prepped with chloraprep, a puncture wound was made over the pins, and the pins were removed using using a needle .      Hand therapy for finger ROM. Splint for strenuous activities    Next Visit:    Follow-up: 4 week(s).    Imaging: XR right ring finger .    Plan: review imaging, advance activities    ERNESTINE NIXON MD        Hand / Upper Extremity Injection/Arthrocentesis    Date/Time: 8/28/2024 1:45 PM    Performed by: Ernestine Nixon MD  Authorized by: Ernestine Nixon MD    Indications:  Pain  Needle Size:  25 G  Guidance: landmark     Condition comment:  Right Ring finger Buried pin removal with digital block     Medications:  10 mL lidocaine (PF) 1 %  Outcome:  Tolerated well, no immediate complications  Procedure discussed: discussed risks, benefits, and alternatives    Consent Given by:  Patient  Timeout: timeout called immediately prior to procedure    Prep: patient was prepped and draped in usual sterile fashion        [facility] OR[facility] [address]  4TH FLOOR  [facility] [address] [phone]  Dept:  [phone]  ______________________________________________________________________________    Patient: Malgorzata Ramachandran   : 1995   MRN: [id number]   2024    INVASIVE PROCEDURE SAFETY CHECKLIST    Date: 2024   Procedure:Right Ring finger buried pin removal with lidocaine 1% injection for pain block  Patient Name: Malgorzata Ramachandran  MRN: [id number]  YOB: 1995    Action: Complete sections as appropriate. Any discrepancy results in a HARD COPY until resolved.     PRE PROCEDURE:  Patient ID verified with 2 identifiers (name and  or MRN): Yes  Procedure and site verified with patient/designee (when able): Yes  Accurate consent documentation in medical record: Yes  H&P (or appropriate assessment) documented in medical record: Yes  H&P must be up to 20 days prior to procedure and updates within 24 hours of procedure as applicable: Yes  Relevant diagnostic and radiology test results appropriately labeled and displayed as applicable: NA  Procedure site(s) marked with provider initials: NA    TIMEOUT:  Time-Out performed immediately prior to starting procedure, including verbal and active participation of all team members addressing the following:Yes  * Correct patient identify  * Confirmed that the correct side and site are marked  * An accurate procedure consent form  * Agreement on the procedure to be done  * Correct patient position  * Relevant images and results are properly labeled and appropriately displayed  * The need to administer antibiotics or fluids for irrigation purposes during the procedure as applicable   * Safety precautions based on patient history or medication use    DURING PROCEDURE: Verification of correct person, site, and procedures any time the responsibility for care of the patient is transferred to another member of the care team.       The following medications were given:         Prior to injection, verified patient identity using patient's name  and date of birth.  Due to injection administration, patient instructed to remain in clinic for 15 minutes  afterwards, and to report any adverse reaction to me immediately.    Digital block injection with Lidocaine 1%      Medication Name Lidocaine 1% NDC 10355-035-38    Scribed by TAYO VAUGHAN, ATC for Dr. Giraldo on August 28, 2024 at 1:48pm based on the provider's statements to me.     TAYO VAUGHAN, ATC      Again, thank you for allowing me to participate in the care of your patient.        Sincerely,        PEPPER NIXON MD

## 2024-08-28 NOTE — PROGRESS NOTES
Hand / Upper Extremity Injection/Arthrocentesis    Date/Time: 2024 1:45 PM    Performed by: Ernestine Toney MD  Authorized by: Ernestine Toney MD    Indications:  Pain  Needle Size:  25 G  Guidance: landmark     Condition comment:  Right Ring finger Buried pin removal with digital block     Medications:  10 mL lidocaine (PF) 1 %  Outcome:  Tolerated well, no immediate complications  Procedure discussed: discussed risks, benefits, and alternatives    Consent Given by:  Patient  Timeout: timeout called immediately prior to procedure    Prep: patient was prepped and draped in usual sterile fashion        [facility] [address]  [address] [facility] [address]  [phone]  Dept: [phone]  ______________________________________________________________________________    Patient: Malgorzata Ramachandran   : 1995   MRN: [id number]   2024    INVASIVE PROCEDURE SAFETY CHECKLIST    Date: 2024   Procedure:Right Ring finger buried pin removal with lidocaine 1% injection for pain block  Patient Name: Malgorzata Ramachandran  MRN: [id number]  YOB: 1995    Action: Complete sections as appropriate. Any discrepancy results in a HARD COPY until resolved.     PRE PROCEDURE:  Patient ID verified with 2 identifiers (name and  or MRN): Yes  Procedure and site verified with patient/designee (when able): Yes  Accurate consent documentation in medical record: Yes  H&P (or appropriate assessment) documented in medical record: Yes  H&P must be up to 20 days prior to procedure and updates within 24 hours of procedure as applicable: Yes  Relevant diagnostic and radiology test results appropriately labeled and displayed as applicable: NA  Procedure site(s) marked with provider initials: NA    TIMEOUT:  Time-Out performed immediately prior to starting procedure, including verbal and active participation of all team members addressing the  following:Yes  * Correct patient identify  * Confirmed that the correct side and site are marked  * An accurate procedure consent form  * Agreement on the procedure to be done  * Correct patient position  * Relevant images and results are properly labeled and appropriately displayed  * The need to administer antibiotics or fluids for irrigation purposes during the procedure as applicable   * Safety precautions based on patient history or medication use    DURING PROCEDURE: Verification of correct person, site, and procedures any time the responsibility for care of the patient is transferred to another member of the care team.       The following medications were given:         Prior to injection, verified patient identity using patient's name and date of birth.  Due to injection administration, patient instructed to remain in clinic for 15 minutes  afterwards, and to report any adverse reaction to me immediately.    Digital block injection with Lidocaine 1%      Medication Name Lidocaine 1% NDC 42071-303-27    Scribed by TAYO VAUGHAN, JOSEFINA for Dr. Giraldo on August 28, 2024 at 1:48pm based on the provider's statements to me.     TAYO VAUGHAN, ATC

## 2024-08-28 NOTE — LETTER
[facility] [address]  [address] [facility] [address]  [phone]          August 28, 2024    RE:  Malgorzata Ramachandran                                                                                                                                                       [address] [facility] [address]            To whom it may concern:    Malgorzata Ramachandran is under my professional care for Right ring finger middle phalanx closed reduction percutaneous pinning. DOS: 8/1/24  The employee is ABLE to return to work on 9/2/24.        Sincerely,        Ernestine Toney MD

## 2024-08-28 NOTE — NURSING NOTE
Reason For Visit:   Chief Complaint   Patient presents with    Surgical Followup     Post op Right ring finger middle phalanx closed reduction percutaneous pinning. DOS: 8/1/24       [facility]  Ref. MD: Est    Age: 29 year old    ?  No      There were no vitals taken for this visit.      Pain Assessment  Patient Currently in Pain: No    Hand Dominance Evaluation  Hand Dominance: Right          QuickDASH Assessment       No data to display                   Current Outpatient Medications   Medication Sig Dispense Refill    acetaminophen (TYLENOL) 500 MG tablet Take 1-2 tablets (500-1,000 mg) by mouth every 6 hours as needed for mild pain (Patient not taking: Reported on 7/28/2024) 60 tablet 0    ferrous gluconate (FERGON) 324 (38 Fe) MG tablet Take 1 tablet (324 mg) by mouth daily (with breakfast) (Patient not taking: Reported on 7/28/2024) 60 tablet 3    ibuprofen (ADVIL/MOTRIN) 600 MG tablet Take 1 tablet (600 mg) by mouth every 6 hours as needed for moderate pain (Patient not taking: Reported on 7/28/2024) 60 tablet 0    Prenatal Vit-Fe Fumarate-FA (PRENATAL VITAMIN) 27-0.8 MG TABS  (Patient not taking: Reported on 7/28/2024)         No Known Allergies    Aure Mao, ATC

## 2024-09-15 NOTE — PLAN OF CARE
"Problem: Goal Outcome Summary  Goal: Goal Outcome Summary  Outcome: Improving  Data: Patient stable; temperature elevated at 99.8 and patient stated \"I don't feel so well\", complaints of feeling hot and malaise. Postpartum checks within normal limits - see flow record. Patient eating and drinking normally. Incision is covered with dressing, with small drainage. Patient performing self cares and is able to care for infant. Patient states that she was in ER on 3/19/17 with UTI; denies any urinary frequency, urgency, dysuria or hematuria. Many family members present visiting patient and .     Action: Patient medicated during the shift for pain and fever. See MAR. Patient reassessed within 1 hour after each medication and pain, fever and malaise were improved - patient stated she was comfortable. Patient education done about DVT prevention, pain management, and normal postpartum findings. See flow record.     Response: Positive attachment behaviors observed with infant. Father of baby present and supportive.     Plan: Report given to MICHAEL Barrera at 1900 who assumed patient's cares. Anticipate continued discharge planning.      "
Assumed care of patient at 0940 Patient is a scheduled c/s for today at 11:30.  She reports having contractions starting at 0500 this AM.  NPO since 3/29/17 midnight.  IV had been placed by Gudelia Pereyra RN and MARKY meehan.  Assessment completed and documented.  Patient declines the RPR.  Labs had been drawn yesterday.  VSS    Riky will be with her in the OR.  Patient rating contractions at a 5 and states she is managing with breathing and relaxation.    
Data: Malgorzata Ramachandran transferred to [facility] via cart at 1415. Baby transferred via crib.  Action: Receiving unit notified of transfer: Yes. Patient and family notified of room change. Report given to Mansi at 1425. Belongings sent to receiving unit. Accompanied by Registered Nurse. Oriented patient to surroundings. Call light within reach. ID bands double-checked with receiving RN.  Response: Patient tolerated transfer and is stable.  
Problem: Goal Outcome Summary  Goal: Goal Outcome Summary  Outcome: Improving  Patient had increased pain from [id number]. Ibuprofen, tylenol, oxycodone, abdominal binder, cold and heat applied which eventually gave relief. Pain better controlled after . Patient requests pain medication be given when due to stay ahead of pain. After pain well managed, ambulated in always. Independent with self and  cares. Bonding with baby. Hoping for early discharge later today.      
Problem: Goal Outcome Summary  Goal: Goal Outcome Summary  Outcome: Improving  Patient transferred from L&D. VSS. Oriented to room.       
Problem: Goal Outcome Summary  Goal: Goal Outcome Summary  Outcome: Improving  Pt POD #0. At onset of shift, pt c/o significant nausea and severe pain.  IV Zopfran and IV Dilaudid administered with good relief.  Nausea passed by 1800 and pt desires to start taking clears and some crackers.  Pitocin infusion completed, D5 LR running.  Jones draining clear concentrated urine. Incision with island dressing CDI. Formula feeding infant. Positive bonding observed. Family at bedside for entire shift until approx 2230. Patient meeting all other expected goals at this time.             
Problem: Goal Outcome Summary  Goal: Goal Outcome Summary  Outcome: Improving  Vitals stable. Postpartum checks WNL. Adequate PO intake. Able to void spontaneously without difficulty. Saline locked removed. Pain managed with tylenol, toradol, ibuprofen and oxycodone. Up ind to SBA when needed. Bonding well with infant. Bottlefeeding infant. SO at bedside. Meeting expected goals. Will continue to monitor.           
Problem: Goal Outcome Summary  Goal: Goal Outcome Summary  Outcome: No Change  Data: Vital signs within normal limits- tempx1, since resolved. Postpartum checks within normal limits - see flow record. Patient eating and drinking normally. Jones cath removed. Pt up and out of bedx2 over night. No apparent signs of infection. Incision covered with no drainage. Patient performing self cares and is able to care for infant.  Action: Patient medicated during the shift for pain. See MAR. Patient reassessed within 1 hour after each medication and pain was improved - patient stated she was comfortable. Patient education done. See flow record.  Response: Positive attachment behaviors observed with infant. Support person present.   Plan: Void within 3 hours of cath removal. Anticipate discharge.      
Problem: Postpartum ( Delivery) (Adult)  Goal: Signs and Symptoms of Listed Potential Problems Will be Absent or Manageable (Postpartum)  Signs and symptoms of listed potential problems will be absent or manageable by discharge/transition of care (reference Postpartum ( Delivery) (Adult) [facility]).   Outcome: Improving  febrile      
Pt discharging home with baby. Discharge instructions, follow up appointment, home care referral, and discharge medications reviewed; pt verbalized understanding. No further questions at this time. ID bands verified.  
Home

## 2024-09-17 DIAGNOSIS — S62.624A CLOSED DISPLACED FRACTURE OF MIDDLE PHALANX OF RIGHT RING FINGER, INITIAL ENCOUNTER: Primary | ICD-10-CM

## 2025-07-13 ENCOUNTER — HEALTH MAINTENANCE LETTER (OUTPATIENT)
Age: 30
End: 2025-07-13

## (undated) DEVICE — GLOVE PROTEXIS POWDER FREE SMT 6.0  2D72PT60X

## (undated) DEVICE — LINEN FULL SHEET 5511

## (undated) DEVICE — SOL NACL 0.9% IRRIG 500ML BOTTLE 2F7123

## (undated) DEVICE — ESU LIGASURE OPEN SEALER/DIVIDER SM JAW 16.5MM LF1212A

## (undated) DEVICE — GLOVE PROTEXIS W/NEU-THERA 6.5  2D73TE65

## (undated) DEVICE — STOCKING SLEEVE VASOPRESS COMPRESSION CALF MED 18" VP501M

## (undated) DEVICE — SU VICRYL 0 CT-1 36" J346H

## (undated) DEVICE — CAP BABY PINK/BLUE IC-2

## (undated) DEVICE — SUCTION CANISTER MEDIVAC LINER 3000ML W/LID 65651-530

## (undated) DEVICE — LINEN DRAPE 54X72" 5467

## (undated) DEVICE — PACK HAND CUSTOM ASC

## (undated) DEVICE — SUCTION MANIFOLD NEPTUNE 2 SYS 1 PORT 702-025-000

## (undated) DEVICE — LINEN TOWEL PACK X10 5473

## (undated) DEVICE — SU MONOCRYL 0 CTX 36" Y398H

## (undated) DEVICE — CATH TRAY FOLEY SURESTEP 16FR DRAIN BAG STATOCK A899916

## (undated) DEVICE — DRSG STERI STRIP 1/2X4" R1547

## (undated) DEVICE — GLOVE PROTEXIS BLUE W/NEU-THERA 6.5  2D73EB65

## (undated) DEVICE — PREP CHLORAPREP 26ML TINTED ORANGE  260815

## (undated) DEVICE — PACK C-SECTION LF PL15OTA83B

## (undated) DEVICE — MINI C-ARM KITE W/PLATE PROTECTOR & FOOT COVER 5999777

## (undated) DEVICE — BLADE KNIFE SURG 10 371110

## (undated) DEVICE — PAD CHUX UNDERPAD 30X36" P3036C

## (undated) DEVICE — GLOVE PROTEXIS MICRO 6.5  2D73PM65

## (undated) DEVICE — SUCTION CANISTER STRAW 65652-008

## (undated) DEVICE — BNDG ELASTIC 3"X5YDS UNSTERILE 6611-30

## (undated) DEVICE — DRSG TELFA 3X8" 1238

## (undated) DEVICE — SU MONOCRYL 4-0 PS-2 27" UND Y426H

## (undated) DEVICE — ESU GROUND PAD ADULT W/CORD E7507

## (undated) DEVICE — ESU PENCIL SMOKE EVAC W/ROCKER SWITCH 0703-047-000

## (undated) DEVICE — SU PLAIN 3-0 CT 27" 852H

## (undated) DEVICE — GLOVE PROTEXIS MICRO 7.0  2D73PM70

## (undated) DEVICE — BARRIER SEPRAFILM 5X6" SINGLE SHEET 4301-02

## (undated) DEVICE — SU VICRYL 3-0 KS 27" UND J663H

## (undated) DEVICE — SOL NACL 0.9% IRRIG 1000ML BOTTLE 2F7124

## (undated) DEVICE — GLOVE PROTEXIS BLUE W/NEU-THERA 6.0  2D73EB60

## (undated) DEVICE — TRANSFER DEVICE BLOOD NDL HOLDER 364880

## (undated) DEVICE — LINEN BABY BLANKET 5434

## (undated) DEVICE — SOL WATER IRRIG 1000ML BOTTLE 2F7114

## (undated) DEVICE — EXOFIN

## (undated) DEVICE — SU MONOCRYL 0 CT 36" Y358H

## (undated) DEVICE — LINEN HALF SHEET 5512

## (undated) DEVICE — LINEN ORTHO PACK 5446

## (undated) DEVICE — COVER CAMERA IN-LIGHT DISP LT-C02

## (undated) DEVICE — GLOVE PROTEXIS BLUE W/NEU-THERA 7.0  2D73EB70

## (undated) DEVICE — SU MONOCRYL 3-0 SH 27" Y316H

## (undated) DEVICE — DRSG TEGADERM 4X10" 1627

## (undated) DEVICE — GLOVE BIOGEL PI MICRO SZ 6.0 48560

## (undated) DEVICE — PREP CHLORAPREP 26ML TINTED HI-LITE ORANGE 930815

## (undated) DEVICE — BAG CLEAR TRASH 1.3M 39X33" P4040C

## (undated) DEVICE — GLOVE PROTEXIS POWDER FREE SMT 6.5  2D72PT65X

## (undated) DEVICE — SU PDS II 0 CTX 60" Z990G

## (undated) DEVICE — Device

## (undated) DEVICE — ADH SKIN CLOSURE PREMIERPRO EXOFIN 1.0ML 3470

## (undated) DEVICE — GLOVE BIOGEL PI MICRO INDICATOR UNDERGLOVE SZ 6.0 48960

## (undated) DEVICE — SU MONOCRYL 0 CT-1 36" UND Y946H

## (undated) DEVICE — BLADE CLIPPER SGL USE 9680

## (undated) RX ORDER — ONDANSETRON 2 MG/ML
INJECTION INTRAMUSCULAR; INTRAVENOUS
Status: DISPENSED
Start: 2024-08-01

## (undated) RX ORDER — EPHEDRINE SULFATE 50 MG/ML
INJECTION, SOLUTION INTRAMUSCULAR; INTRAVENOUS; SUBCUTANEOUS
Status: DISPENSED
Start: 2021-10-01

## (undated) RX ORDER — OXYTOCIN/0.9 % SODIUM CHLORIDE 30/500 ML
PLASTIC BAG, INJECTION (ML) INTRAVENOUS
Status: DISPENSED
Start: 2021-10-01

## (undated) RX ORDER — MORPHINE SULFATE 1 MG/ML
INJECTION, SOLUTION EPIDURAL; INTRATHECAL; INTRAVENOUS
Status: DISPENSED
Start: 2021-10-01

## (undated) RX ORDER — ONDANSETRON 4 MG/1
TABLET, ORALLY DISINTEGRATING ORAL
Status: DISPENSED
Start: 2018-01-07

## (undated) RX ORDER — ONDANSETRON 2 MG/ML
INJECTION INTRAMUSCULAR; INTRAVENOUS
Status: DISPENSED
Start: 2021-10-01

## (undated) RX ORDER — OXYTOCIN/0.9 % SODIUM CHLORIDE 30/500 ML
PLASTIC BAG, INJECTION (ML) INTRAVENOUS
Status: DISPENSED
Start: 2017-03-30

## (undated) RX ORDER — PROPOFOL 10 MG/ML
INJECTION, EMULSION INTRAVENOUS
Status: DISPENSED
Start: 2024-08-01

## (undated) RX ORDER — FENTANYL CITRATE-0.9 % NACL/PF 10 MCG/ML
PLASTIC BAG, INJECTION (ML) INTRAVENOUS
Status: DISPENSED
Start: 2021-10-01

## (undated) RX ORDER — CEFAZOLIN SODIUM 2 G/50ML
SOLUTION INTRAVENOUS
Status: DISPENSED
Start: 2024-08-01

## (undated) RX ORDER — ONDANSETRON 2 MG/ML
INJECTION INTRAMUSCULAR; INTRAVENOUS
Status: DISPENSED
Start: 2017-03-30

## (undated) RX ORDER — FENTANYL CITRATE 50 UG/ML
INJECTION, SOLUTION INTRAMUSCULAR; INTRAVENOUS
Status: DISPENSED
Start: 2024-08-01

## (undated) RX ORDER — LIDOCAINE HYDROCHLORIDE 10 MG/ML
INJECTION, SOLUTION INFILTRATION; PERINEURAL
Status: DISPENSED
Start: 2024-08-28

## (undated) RX ORDER — GLYCOPYRROLATE 0.2 MG/ML
INJECTION INTRAMUSCULAR; INTRAVENOUS
Status: DISPENSED
Start: 2017-03-30

## (undated) RX ORDER — DEXAMETHASONE SODIUM PHOSPHATE 4 MG/ML
INJECTION, SOLUTION INTRA-ARTICULAR; INTRALESIONAL; INTRAMUSCULAR; INTRAVENOUS; SOFT TISSUE
Status: DISPENSED
Start: 2017-03-30

## (undated) RX ORDER — MORPHINE SULFATE 1 MG/ML
INJECTION, SOLUTION EPIDURAL; INTRATHECAL; INTRAVENOUS
Status: DISPENSED
Start: 2017-03-30